# Patient Record
Sex: MALE | Race: WHITE | Employment: FULL TIME | URBAN - METROPOLITAN AREA
[De-identification: names, ages, dates, MRNs, and addresses within clinical notes are randomized per-mention and may not be internally consistent; named-entity substitution may affect disease eponyms.]

---

## 2017-04-12 ENCOUNTER — OFFICE VISIT (OUTPATIENT)
Dept: INTERNAL MEDICINE CLINIC | Age: 62
End: 2017-04-12

## 2017-04-12 VITALS
SYSTOLIC BLOOD PRESSURE: 140 MMHG | OXYGEN SATURATION: 95 % | WEIGHT: 255 LBS | HEART RATE: 100 BPM | RESPIRATION RATE: 16 BRPM | BODY MASS INDEX: 31.04 KG/M2 | DIASTOLIC BLOOD PRESSURE: 90 MMHG

## 2017-04-12 DIAGNOSIS — Z12.12 SCREENING FOR COLORECTAL CANCER: ICD-10-CM

## 2017-04-12 DIAGNOSIS — E78.2 MIXED HYPERLIPIDEMIA: ICD-10-CM

## 2017-04-12 DIAGNOSIS — I48.19 ATRIAL FIBRILLATION, PERSISTENT (HCC): Primary | ICD-10-CM

## 2017-04-12 DIAGNOSIS — I10 ESSENTIAL HYPERTENSION: ICD-10-CM

## 2017-04-12 DIAGNOSIS — M10.079 IDIOPATHIC GOUT OF FOOT, UNSPECIFIED CHRONICITY, UNSPECIFIED LATERALITY: ICD-10-CM

## 2017-04-12 DIAGNOSIS — Z00.00 PREVENTATIVE HEALTH CARE: ICD-10-CM

## 2017-04-12 DIAGNOSIS — I48.91 ATRIAL FIBRILLATION, NEW ONSET (HCC): ICD-10-CM

## 2017-04-12 DIAGNOSIS — Z12.11 SCREENING FOR COLORECTAL CANCER: ICD-10-CM

## 2017-04-12 PROCEDURE — 93000 ELECTROCARDIOGRAM COMPLETE: CPT | Performed by: INTERNAL MEDICINE

## 2017-04-12 PROCEDURE — 99213 OFFICE O/P EST LOW 20 MIN: CPT | Performed by: INTERNAL MEDICINE

## 2017-04-12 RX ORDER — ATORVASTATIN CALCIUM 10 MG/1
TABLET, FILM COATED ORAL
Qty: 90 TABLET | Refills: 1 | Status: SHIPPED | OUTPATIENT
Start: 2017-04-12 | End: 2017-10-16 | Stop reason: SDUPTHER

## 2017-04-12 RX ORDER — PREDNISONE 1 MG/1
TABLET ORAL
Qty: 36 TABLET | Refills: 0 | Status: SHIPPED | OUTPATIENT
Start: 2017-04-12 | End: 2018-05-22 | Stop reason: ALTCHOICE

## 2017-04-12 RX ORDER — ALLOPURINOL 100 MG/1
100 TABLET ORAL DAILY
Qty: 90 TABLET | Refills: 1 | Status: SHIPPED | OUTPATIENT
Start: 2017-04-12 | End: 2017-10-16 | Stop reason: SDUPTHER

## 2017-04-12 RX ORDER — LISINOPRIL 10 MG/1
10 TABLET ORAL DAILY
Qty: 90 TABLET | Refills: 1 | Status: SHIPPED | OUTPATIENT
Start: 2017-04-12 | End: 2017-10-16 | Stop reason: SDUPTHER

## 2017-04-12 ASSESSMENT — PATIENT HEALTH QUESTIONNAIRE - PHQ9
1. LITTLE INTEREST OR PLEASURE IN DOING THINGS: 0
2. FEELING DOWN, DEPRESSED OR HOPELESS: 0
SUM OF ALL RESPONSES TO PHQ QUESTIONS 1-9: 0
SUM OF ALL RESPONSES TO PHQ9 QUESTIONS 1 & 2: 0

## 2017-04-13 ENCOUNTER — INITIAL CONSULT (OUTPATIENT)
Dept: CARDIOLOGY CLINIC | Age: 62
End: 2017-04-13

## 2017-04-13 VITALS
WEIGHT: 257 LBS | HEART RATE: 85 BPM | OXYGEN SATURATION: 97 % | HEIGHT: 76 IN | DIASTOLIC BLOOD PRESSURE: 80 MMHG | SYSTOLIC BLOOD PRESSURE: 128 MMHG | BODY MASS INDEX: 31.29 KG/M2

## 2017-04-13 DIAGNOSIS — I10 ESSENTIAL HYPERTENSION: ICD-10-CM

## 2017-04-13 DIAGNOSIS — E78.2 MIXED HYPERLIPIDEMIA: ICD-10-CM

## 2017-04-13 DIAGNOSIS — I48.91 NEW ONSET ATRIAL FIBRILLATION (HCC): Primary | ICD-10-CM

## 2017-04-13 PROCEDURE — 99203 OFFICE O/P NEW LOW 30 MIN: CPT | Performed by: INTERNAL MEDICINE

## 2017-04-13 PROCEDURE — 93225 XTRNL ECG REC<48 HRS REC: CPT | Performed by: INTERNAL MEDICINE

## 2017-04-17 LAB — HEPATITIS C ANTIBODY: NEGATIVE

## 2017-04-20 ENCOUNTER — OFFICE VISIT (OUTPATIENT)
Dept: CARDIOLOGY CLINIC | Age: 62
End: 2017-04-20

## 2017-04-20 VITALS
SYSTOLIC BLOOD PRESSURE: 120 MMHG | HEIGHT: 76 IN | HEART RATE: 82 BPM | WEIGHT: 251 LBS | DIASTOLIC BLOOD PRESSURE: 82 MMHG | BODY MASS INDEX: 30.56 KG/M2

## 2017-04-20 DIAGNOSIS — I48.91 NEW ONSET ATRIAL FIBRILLATION (HCC): Primary | ICD-10-CM

## 2017-04-20 DIAGNOSIS — I10 ESSENTIAL HYPERTENSION: ICD-10-CM

## 2017-04-20 PROCEDURE — 93227 XTRNL ECG REC<48 HR R&I: CPT | Performed by: INTERNAL MEDICINE

## 2017-04-20 PROCEDURE — 99213 OFFICE O/P EST LOW 20 MIN: CPT | Performed by: INTERNAL MEDICINE

## 2017-04-20 RX ORDER — METOPROLOL TARTRATE 50 MG/1
50 TABLET, FILM COATED ORAL 2 TIMES DAILY
Qty: 30 TABLET | Refills: 5 | Status: SHIPPED | OUTPATIENT
Start: 2017-04-20 | End: 2017-10-16 | Stop reason: SDUPTHER

## 2017-04-25 LAB
COMMENT: NORMAL
DATE:: NORMAL
HEMOCCULT SP1 STL QL: NEGATIVE
HEMOCCULT SP2 STL QL: NEGATIVE
HEMOCCULT SP3 STL QL: NEGATIVE
Lab: NORMAL
RESOLUTION: NORMAL

## 2017-05-04 ENCOUNTER — PROCEDURE VISIT (OUTPATIENT)
Dept: CARDIOLOGY CLINIC | Age: 62
End: 2017-05-04

## 2017-05-04 DIAGNOSIS — I48.91 NEW ONSET ATRIAL FIBRILLATION (HCC): ICD-10-CM

## 2017-05-04 DIAGNOSIS — I48.91 NEW ONSET A-FIB (HCC): ICD-10-CM

## 2017-05-04 DIAGNOSIS — R94.31 ABNORMAL EKG: Primary | ICD-10-CM

## 2017-05-04 DIAGNOSIS — I10 ESSENTIAL HYPERTENSION: ICD-10-CM

## 2017-05-04 PROCEDURE — 93015 CV STRESS TEST SUPVJ I&R: CPT | Performed by: INTERNAL MEDICINE

## 2017-10-16 ENCOUNTER — OFFICE VISIT (OUTPATIENT)
Dept: INTERNAL MEDICINE CLINIC | Age: 62
End: 2017-10-16

## 2017-10-16 VITALS
HEART RATE: 70 BPM | DIASTOLIC BLOOD PRESSURE: 84 MMHG | SYSTOLIC BLOOD PRESSURE: 130 MMHG | HEIGHT: 76 IN | BODY MASS INDEX: 30.56 KG/M2 | OXYGEN SATURATION: 98 % | WEIGHT: 251 LBS

## 2017-10-16 DIAGNOSIS — I10 ESSENTIAL HYPERTENSION: Primary | ICD-10-CM

## 2017-10-16 DIAGNOSIS — I48.91 NEW ONSET ATRIAL FIBRILLATION (HCC): ICD-10-CM

## 2017-10-16 DIAGNOSIS — F51.01 PRIMARY INSOMNIA: ICD-10-CM

## 2017-10-16 DIAGNOSIS — E78.2 MIXED HYPERLIPIDEMIA: ICD-10-CM

## 2017-10-16 DIAGNOSIS — M10.079 IDIOPATHIC GOUT OF FOOT, UNSPECIFIED CHRONICITY, UNSPECIFIED LATERALITY: ICD-10-CM

## 2017-10-16 PROCEDURE — 99213 OFFICE O/P EST LOW 20 MIN: CPT | Performed by: INTERNAL MEDICINE

## 2017-10-16 RX ORDER — ATORVASTATIN CALCIUM 10 MG/1
TABLET, FILM COATED ORAL
Qty: 90 TABLET | Refills: 1 | Status: SHIPPED | OUTPATIENT
Start: 2017-10-16 | End: 2017-10-31 | Stop reason: SDUPTHER

## 2017-10-16 RX ORDER — INDOMETHACIN 50 MG/1
50 CAPSULE ORAL 3 TIMES DAILY
Qty: 180 CAPSULE | Refills: 1 | Status: ON HOLD | OUTPATIENT
Start: 2017-10-16 | End: 2019-05-09 | Stop reason: HOSPADM

## 2017-10-16 RX ORDER — LISINOPRIL 10 MG/1
10 TABLET ORAL DAILY
Qty: 90 TABLET | Refills: 1 | Status: SHIPPED | OUTPATIENT
Start: 2017-10-16 | End: 2018-05-22 | Stop reason: SDUPTHER

## 2017-10-16 RX ORDER — ALLOPURINOL 100 MG/1
100 TABLET ORAL DAILY
Qty: 90 TABLET | Refills: 1 | Status: SHIPPED | OUTPATIENT
Start: 2017-10-16 | End: 2018-04-05 | Stop reason: SDUPTHER

## 2017-10-16 RX ORDER — PREDNISONE 1 MG/1
TABLET ORAL
Qty: 36 TABLET | Refills: 0 | Status: CANCELLED | OUTPATIENT
Start: 2017-10-16

## 2017-10-16 RX ORDER — LANOLIN ALCOHOL/MO/W.PET/CERES
3 CREAM (GRAM) TOPICAL DAILY
Qty: 1 TABLET | Refills: 0 | Status: SHIPPED | OUTPATIENT
Start: 2017-10-16 | End: 2018-08-20 | Stop reason: SDUPTHER

## 2017-10-16 RX ORDER — PREDNISONE 1 MG/1
TABLET ORAL
Qty: 36 TABLET | Refills: 0 | Status: SHIPPED | OUTPATIENT
Start: 2017-10-16 | End: 2018-05-22 | Stop reason: ALTCHOICE

## 2017-10-16 NOTE — PROGRESS NOTES
diet and also exercise, wt loss as appropriate. Will continue periodic monitoring of fasting lipid profile, glucose, liver function. -     atorvastatin (LIPITOR) 10 MG tablet; TAKE 1 TABLET BY MOUTH ONE TIME A DAY    Idiopathic gout of foot, unspecified chronicity, unspecified laterality- CONT RX, KEEPING A PRED TAPER ON HAND FOR ANY SEVERE FLARE  -     allopurinol (ZYLOPRIM) 100 MG tablet; Take 1 tablet by mouth daily  -     indomethacin (INDOCIN) 50 MG capsule; Take 1 capsule by mouth 3 times daily  -     predniSONE (DELTASONE) 5 MG tablet; Take 8 pills, then 7,6,5,4,3,2,1    New onset atrial fibrillation ([address]- CLINICALLY RATE STABLE BUT STILL IRREG, FOR CONT CLOSE F/U W DR CONLEY, DEFER TO CARDIOLOGY IF NEEDS ANTICOAGULATION  -     metoprolol tartrate (LOPRESSOR) 25 MG tablet; Take 1 tablet by mouth 2 times daily    Primary insomnia- TRIAL OTC RX PRN  -     melatonin (RA MELATONIN) 3 MG TABS tablet;  Take 1 tablet by mouth daily

## 2017-10-31 DIAGNOSIS — E78.2 MIXED HYPERLIPIDEMIA: ICD-10-CM

## 2017-10-31 RX ORDER — ATORVASTATIN CALCIUM 10 MG/1
TABLET, FILM COATED ORAL
Qty: 30 TABLET | Refills: 5 | Status: SHIPPED | OUTPATIENT
Start: 2017-10-31 | End: 2018-04-03 | Stop reason: SDUPTHER

## 2017-10-31 NOTE — TELEPHONE ENCOUNTER
Spoke with wife. Informed her that Lipitor is free at [facility]. Sent new script.

## 2017-10-31 NOTE — TELEPHONE ENCOUNTER
PLS HAVE HIM CHECK AT [facility] [place], AS LIPITOR WILL LIKELY BE FREE THERE. CAN SEND NEW SCRIPT THERE FOR #30 W 5RF.

## 2017-11-09 ENCOUNTER — OFFICE VISIT (OUTPATIENT)
Dept: CARDIOLOGY CLINIC | Age: 62
End: 2017-11-09

## 2017-11-09 VITALS
HEART RATE: 78 BPM | WEIGHT: 253.4 LBS | DIASTOLIC BLOOD PRESSURE: 86 MMHG | HEIGHT: 76 IN | RESPIRATION RATE: 16 BRPM | SYSTOLIC BLOOD PRESSURE: 136 MMHG | BODY MASS INDEX: 30.86 KG/M2

## 2017-11-09 DIAGNOSIS — I10 ESSENTIAL HYPERTENSION: Primary | ICD-10-CM

## 2017-11-09 DIAGNOSIS — I48.19 PERSISTENT ATRIAL FIBRILLATION (HCC): ICD-10-CM

## 2017-11-09 DIAGNOSIS — E78.2 MIXED HYPERLIPIDEMIA: ICD-10-CM

## 2017-11-09 PROCEDURE — 99214 OFFICE O/P EST MOD 30 MIN: CPT | Performed by: INTERNAL MEDICINE

## 2017-11-09 RX ORDER — METOPROLOL SUCCINATE 50 MG/1
50 TABLET, EXTENDED RELEASE ORAL DAILY
Qty: 90 TABLET | Refills: 3 | Status: SHIPPED | OUTPATIENT
Start: 2017-11-09 | End: 2018-05-22 | Stop reason: SDUPTHER

## 2017-11-09 NOTE — ASSESSMENT & PLAN NOTE
Metoprolol tartrate is changed to metoprolol succinate 50 mg once a day. He can take it in the morning and if he feels extremely tired or fatigued agents which to evening dose. Potential side effects are discussed and questions answered. His chads score is 1 and he is to continue aspirin once a day.

## 2017-11-09 NOTE — ASSESSMENT & PLAN NOTE
He has a request for blood test.  He needs to have her lipids checked. His LDL goal is less than 100. He is encouraged to take Lipitor at bedtime for best effect and multiple questions are answered and he is educated with regard to lipid metabolism in the body.

## 2017-11-09 NOTE — PROGRESS NOTES
auscultation and percussion  Abdomen: Soft non tender. Bowel sounds are normal. No organomegaly or ascites. Musculoskeletal: WNL  Skin: Normal in color and texture. No rash  Psychiatric: Normal mood and effect. Neurologic exam:  No focal deficit    [id number] Score for Atrial Fibrillation Stroke Risk   Risk   Factors  Component Value   C CHF No 0   H HTN Yes 1   A2 Age >= 76 No,  (60 y.o.) 0   D DM No 0   S2 Prior Stroke/TIA No 0   V Vascular Disease No 0   A Age 74-69 No,  (60 y.o.) 0   Sc Sex male 0    [id number]  Score  1   Score last updated 11/9/17 8:27 AM    LAB REVIEW:  CBC: No results found for: WBC, HGB, HCT, PLT  Lipids: No results found for: CHOL, TRIG, HDL, LDLCALC, LDLDIRECT, LDLCHOLESTEROL, VLDL, LABVLDL, CHOLHDLRATIO  Renal: No results found for: BUN, CREATININE, NA, K  PT/INR: No results found for: INR    IMPRESSION and RECOMMENDATIONS:      Persistent atrial fibrillation (HCC)  Metoprolol tartrate is changed to metoprolol succinate 50 mg once a day. He can take it in the morning and if he feels extremely tired or fatigued agents which to evening dose. Potential side effects are discussed and questions answered. His chads score is 1 and he is to continue aspirin once a day. Hyperlipidemia  He has a request for blood test.  He needs to have her lipids checked. His LDL goal is less than 100. He is encouraged to take Lipitor at bedtime for best effect and multiple questions are answered and he is educated with regard to lipid metabolism in the body. Hypertension  Well controlled on current medications, reviewed individually with patient. Change Lopressor 25 bid to Toprol 25 mg daily. Continue other current cardiovascular medications which have been reviewed and discussed individually with you. Appropriate prescriptions if needed on this visit are addressed. After visit summery is provided. Questions answered and patient verbalizes understanding.  Follow up in office in 12 months with ECG and Echo, sooner if needed. Vernon Roper MD, 11/9/2017 8:44 AM     Please note this report has been partially produced using speech recognition software and may contain errors related to that system including errors in grammar, punctuation, and spelling, as well as words and phrases that may be inappropriate. If there are any questions or concerns please feel free to contact the dictating provider for clarification.

## 2017-11-09 NOTE — PATIENT INSTRUCTIONS
Change Lopressor 25 bid to Toprol 25 mg daily. Continue other current cardiovascular medications which have been reviewed and discussed individually with you. Appropriate prescriptions if needed on this visit are addressed. After visit summery is provided. Questions answered and patient verbalizes understanding. Follow up in office in 12 months with ECG and Echo, sooner if needed.

## 2018-04-05 DIAGNOSIS — M10.079 IDIOPATHIC GOUT OF FOOT, UNSPECIFIED CHRONICITY, UNSPECIFIED LATERALITY: ICD-10-CM

## 2018-04-05 RX ORDER — ALLOPURINOL 100 MG/1
100 TABLET ORAL DAILY
Qty: 30 TABLET | Refills: 0 | Status: SHIPPED | OUTPATIENT
Start: 2018-04-05 | End: 2018-05-22 | Stop reason: SDUPTHER

## 2018-05-22 ENCOUNTER — OFFICE VISIT (OUTPATIENT)
Dept: INTERNAL MEDICINE CLINIC | Age: 63
End: 2018-05-22

## 2018-05-22 VITALS
HEART RATE: 94 BPM | HEIGHT: 76 IN | DIASTOLIC BLOOD PRESSURE: 89 MMHG | RESPIRATION RATE: 17 BRPM | SYSTOLIC BLOOD PRESSURE: 128 MMHG | WEIGHT: 250 LBS | OXYGEN SATURATION: 96 % | BODY MASS INDEX: 30.44 KG/M2

## 2018-05-22 DIAGNOSIS — Z12.11 SCREENING FOR COLORECTAL CANCER: ICD-10-CM

## 2018-05-22 DIAGNOSIS — I10 ESSENTIAL HYPERTENSION: ICD-10-CM

## 2018-05-22 DIAGNOSIS — E78.2 MIXED HYPERLIPIDEMIA: ICD-10-CM

## 2018-05-22 DIAGNOSIS — M10.079 IDIOPATHIC GOUT OF FOOT, UNSPECIFIED CHRONICITY, UNSPECIFIED LATERALITY: ICD-10-CM

## 2018-05-22 DIAGNOSIS — I48.19 PERSISTENT ATRIAL FIBRILLATION (HCC): ICD-10-CM

## 2018-05-22 DIAGNOSIS — Z12.12 SCREENING FOR COLORECTAL CANCER: ICD-10-CM

## 2018-05-22 DIAGNOSIS — Z00.00 ROUTINE GENERAL MEDICAL EXAMINATION AT A HEALTH CARE FACILITY: ICD-10-CM

## 2018-05-22 DIAGNOSIS — Z00.00 ROUTINE GENERAL MEDICAL EXAMINATION AT A HEALTH CARE FACILITY: Primary | ICD-10-CM

## 2018-05-22 DIAGNOSIS — Z23 NEED FOR PROPHYLACTIC VACCINATION AGAINST STREPTOCOCCUS PNEUMONIAE (PNEUMOCOCCUS): ICD-10-CM

## 2018-05-22 LAB
A/G RATIO: 1.4 (ref 1.1–2.2)
ALBUMIN SERPL-MCNC: 4.3 G/DL (ref 3.4–5)
ALP BLD-CCNC: 88 U/L (ref 40–129)
ALT SERPL-CCNC: 18 U/L (ref 10–40)
ANION GAP SERPL CALCULATED.3IONS-SCNC: 21 MMOL/L (ref 3–16)
AST SERPL-CCNC: 19 U/L (ref 15–37)
BASOPHILS ABSOLUTE: 0.1 K/UL (ref 0–0.2)
BASOPHILS RELATIVE PERCENT: 1.1 %
BILIRUB SERPL-MCNC: 0.5 MG/DL (ref 0–1)
BUN BLDV-MCNC: 19 MG/DL (ref 7–20)
CALCIUM SERPL-MCNC: 9.4 MG/DL (ref 8.3–10.6)
CHLORIDE BLD-SCNC: 102 MMOL/L (ref 99–110)
CHOLESTEROL, TOTAL: 190 MG/DL (ref 0–199)
CO2: 23 MMOL/L (ref 21–32)
CREAT SERPL-MCNC: 1.2 MG/DL (ref 0.8–1.3)
EOSINOPHILS ABSOLUTE: 0.1 K/UL (ref 0–0.6)
EOSINOPHILS RELATIVE PERCENT: 1.8 %
GFR AFRICAN AMERICAN: >60
GFR NON-AFRICAN AMERICAN: >60
GLOBULIN: 3.1 G/DL
GLUCOSE BLD-MCNC: 87 MG/DL (ref 70–99)
HCT VFR BLD CALC: 42.8 % (ref 40.5–52.5)
HDLC SERPL-MCNC: 41 MG/DL (ref 40–60)
HEMOGLOBIN: 14.5 G/DL (ref 13.5–17.5)
LDL CHOLESTEROL CALCULATED: 115 MG/DL
LYMPHOCYTES ABSOLUTE: 1.4 K/UL (ref 1–5.1)
LYMPHOCYTES RELATIVE PERCENT: 18.4 %
MCH RBC QN AUTO: 30.8 PG (ref 26–34)
MCHC RBC AUTO-ENTMCNC: 33.9 G/DL (ref 31–36)
MCV RBC AUTO: 90.7 FL (ref 80–100)
MONOCYTES ABSOLUTE: 0.6 K/UL (ref 0–1.3)
MONOCYTES RELATIVE PERCENT: 7.9 %
NEUTROPHILS ABSOLUTE: 5.6 K/UL (ref 1.7–7.7)
NEUTROPHILS RELATIVE PERCENT: 70.8 %
PDW BLD-RTO: 13.7 % (ref 12.4–15.4)
PLATELET # BLD: 267 K/UL (ref 135–450)
PMV BLD AUTO: 8.3 FL (ref 5–10.5)
POTASSIUM SERPL-SCNC: 4.6 MMOL/L (ref 3.5–5.1)
RBC # BLD: 4.72 M/UL (ref 4.2–5.9)
SODIUM BLD-SCNC: 146 MMOL/L (ref 136–145)
TOTAL PROTEIN: 7.4 G/DL (ref 6.4–8.2)
TRIGL SERPL-MCNC: 171 MG/DL (ref 0–150)
TSH REFLEX: 2.07 UIU/ML (ref 0.27–4.2)
URIC ACID, SERUM: 8.5 MG/DL (ref 3.5–7.2)
VLDLC SERPL CALC-MCNC: 34 MG/DL
WBC # BLD: 7.9 K/UL (ref 4–11)

## 2018-05-22 PROCEDURE — G0009 ADMIN PNEUMOCOCCAL VACCINE: HCPCS | Performed by: INTERNAL MEDICINE

## 2018-05-22 PROCEDURE — 90670 PCV13 VACCINE IM: CPT | Performed by: INTERNAL MEDICINE

## 2018-05-22 PROCEDURE — G0402 INITIAL PREVENTIVE EXAM: HCPCS | Performed by: INTERNAL MEDICINE

## 2018-05-22 PROCEDURE — 4040F PNEUMOC VAC/ADMIN/RCVD: CPT | Performed by: INTERNAL MEDICINE

## 2018-05-22 RX ORDER — ALLOPURINOL 100 MG/1
100 TABLET ORAL DAILY
Qty: 90 TABLET | Refills: 1 | Status: SHIPPED | OUTPATIENT
Start: 2018-05-22 | End: 2018-05-23 | Stop reason: SDUPTHER

## 2018-05-22 RX ORDER — METOPROLOL SUCCINATE 50 MG/1
50 TABLET, EXTENDED RELEASE ORAL DAILY
Qty: 90 TABLET | Refills: 1 | Status: SHIPPED | OUTPATIENT
Start: 2018-05-22 | End: 2018-08-14 | Stop reason: SDUPTHER

## 2018-05-22 RX ORDER — LISINOPRIL 10 MG/1
10 TABLET ORAL DAILY
Qty: 90 TABLET | Refills: 1 | Status: SHIPPED | OUTPATIENT
Start: 2018-05-22 | End: 2018-08-14 | Stop reason: SDUPTHER

## 2018-05-22 RX ORDER — ATORVASTATIN CALCIUM 10 MG/1
TABLET, FILM COATED ORAL
Qty: 90 TABLET | Refills: 1 | Status: SHIPPED | OUTPATIENT
Start: 2018-05-22 | End: 2019-01-01 | Stop reason: SDUPTHER

## 2018-05-22 RX ORDER — TERAZOSIN 1 MG/1
1 CAPSULE ORAL NIGHTLY
Qty: 90 CAPSULE | Refills: 1 | Status: SHIPPED | OUTPATIENT
Start: 2018-05-22 | End: 2019-11-13

## 2018-05-22 ASSESSMENT — ANXIETY QUESTIONNAIRES: GAD7 TOTAL SCORE: 0

## 2018-05-22 ASSESSMENT — PATIENT HEALTH QUESTIONNAIRE - PHQ9: SUM OF ALL RESPONSES TO PHQ QUESTIONS 1-9: 0

## 2018-05-22 ASSESSMENT — LIFESTYLE VARIABLES: HOW OFTEN DO YOU HAVE A DRINK CONTAINING ALCOHOL: 0

## 2018-05-23 ENCOUNTER — TELEPHONE (OUTPATIENT)
Dept: INTERNAL MEDICINE CLINIC | Age: 63
End: 2018-05-23

## 2018-05-23 DIAGNOSIS — M10.079 IDIOPATHIC GOUT OF FOOT, UNSPECIFIED CHRONICITY, UNSPECIFIED LATERALITY: ICD-10-CM

## 2018-05-23 RX ORDER — ALLOPURINOL 300 MG/1
300 TABLET ORAL DAILY
Qty: 90 TABLET | Refills: 1 | Status: SHIPPED | OUTPATIENT
Start: 2018-05-23 | End: 2018-08-24 | Stop reason: SDUPTHER

## 2018-05-24 LAB — VZV IGG SER QL IA: 388 IV

## 2018-06-18 ENCOUNTER — TELEPHONE (OUTPATIENT)
Dept: INTERNAL MEDICINE CLINIC | Age: 63
End: 2018-06-18

## 2018-06-18 DIAGNOSIS — M79.673 PAIN OF FOOT, UNSPECIFIED LATERALITY: Primary | ICD-10-CM

## 2018-06-18 DIAGNOSIS — M10.079 IDIOPATHIC GOUT OF FOOT, UNSPECIFIED CHRONICITY, UNSPECIFIED LATERALITY: ICD-10-CM

## 2018-06-21 ENCOUNTER — PAT TELEPHONE (OUTPATIENT)
Dept: SURGERY | Age: 63
End: 2018-06-21

## 2018-06-26 ENCOUNTER — HOSPITAL ENCOUNTER (OUTPATIENT)
Dept: SURGERY | Age: 63
Discharge: HOME | End: 2018-06-26
Attending: SPECIALIST | Admitting: SPECIALIST

## 2018-06-26 VITALS
RESPIRATION RATE: 16 BRPM | DIASTOLIC BLOOD PRESSURE: 90 MMHG | WEIGHT: 251 LBS | TEMPERATURE: 97.9 F | BODY MASS INDEX: 30.56 KG/M2 | HEART RATE: 80 BPM | OXYGEN SATURATION: 97 % | SYSTOLIC BLOOD PRESSURE: 134 MMHG | HEIGHT: 76 IN

## 2018-06-26 RX ORDER — SODIUM CHLORIDE, SODIUM LACTATE, POTASSIUM CHLORIDE, CALCIUM CHLORIDE 600; 310; 30; 20 MG/100ML; MG/100ML; MG/100ML; MG/100ML
INJECTION, SOLUTION INTRAVENOUS CONTINUOUS
Status: DISCONTINUED | OUTPATIENT
Start: 2018-06-26 | End: 2018-06-27 | Stop reason: HOSPADM

## 2018-06-26 RX ADMIN — SODIUM CHLORIDE, SODIUM LACTATE, POTASSIUM CHLORIDE, CALCIUM CHLORIDE: 600; 310; 30; 20 INJECTION, SOLUTION INTRAVENOUS at 10:25

## 2018-06-26 ASSESSMENT — PAIN SCALES - GENERAL
PAINLEVEL_OUTOF10: 0
PAINLEVEL_OUTOF10: 0

## 2018-06-26 ASSESSMENT — PAIN - FUNCTIONAL ASSESSMENT: PAIN_FUNCTIONAL_ASSESSMENT: 0-10

## 2018-06-27 PROBLEM — D12.6 TUBULAR ADENOMA OF COLON: Status: ACTIVE | Noted: 2018-06-26

## 2018-06-28 ENCOUNTER — TELEPHONE (OUTPATIENT)
Dept: INTERNAL MEDICINE CLINIC | Age: 63
End: 2018-06-28

## 2018-06-28 DIAGNOSIS — K63.5 POLYP OF COLON, UNSPECIFIED PART OF COLON, UNSPECIFIED TYPE: Primary | ICD-10-CM

## 2018-07-12 ENCOUNTER — OFFICE VISIT (OUTPATIENT)
Dept: SURGERY | Age: 63
End: 2018-07-12

## 2018-07-12 VITALS
BODY MASS INDEX: 30.44 KG/M2 | HEART RATE: 90 BPM | DIASTOLIC BLOOD PRESSURE: 86 MMHG | SYSTOLIC BLOOD PRESSURE: 138 MMHG | HEIGHT: 76 IN | WEIGHT: 250 LBS

## 2018-07-12 DIAGNOSIS — K63.89 CECUM MASS: Primary | ICD-10-CM

## 2018-07-12 PROCEDURE — 4040F PNEUMOC VAC/ADMIN/RCVD: CPT | Performed by: SURGERY

## 2018-07-12 PROCEDURE — 1123F ACP DISCUSS/DSCN MKR DOCD: CPT | Performed by: SURGERY

## 2018-07-12 PROCEDURE — 3017F COLORECTAL CA SCREEN DOC REV: CPT | Performed by: SURGERY

## 2018-07-12 PROCEDURE — 99204 OFFICE O/P NEW MOD 45 MIN: CPT | Performed by: SURGERY

## 2018-07-12 PROCEDURE — 1036F TOBACCO NON-USER: CPT | Performed by: SURGERY

## 2018-07-12 PROCEDURE — G8417 CALC BMI ABV UP PARAM F/U: HCPCS | Performed by: SURGERY

## 2018-07-12 PROCEDURE — 1101F PT FALLS ASSESS-DOCD LE1/YR: CPT | Performed by: SURGERY

## 2018-07-12 PROCEDURE — G8427 DOCREV CUR MEDS BY ELIG CLIN: HCPCS | Performed by: SURGERY

## 2018-07-14 ENCOUNTER — TELEPHONE (OUTPATIENT)
Dept: CARDIOLOGY CLINIC | Age: 63
End: 2018-07-14

## 2018-07-17 ENCOUNTER — TELEPHONE (OUTPATIENT)
Dept: SURGERY | Age: 63
End: 2018-07-17

## 2018-07-19 ENCOUNTER — TELEPHONE (OUTPATIENT)
Dept: CARDIOLOGY CLINIC | Age: 63
End: 2018-07-19

## 2018-07-19 NOTE — TELEPHONE ENCOUNTER
I was returning a call from a message left on answering machine here at office. I was trying to schedule office visit for cardiac clearance for surg on 8/10.  Left message for patient to return call so we can get this set up 7/19 LN

## 2018-07-23 ENCOUNTER — TELEPHONE (OUTPATIENT)
Dept: CARDIOLOGY CLINIC | Age: 63
End: 2018-07-23

## 2018-07-23 NOTE — TELEPHONE ENCOUNTER
Patient called gave all above information to patient.  He voiced understanding and will be looking for the mailed information as well

## 2018-08-02 NOTE — ANESTHESIA PRE-OP
Component Value Date/Time    WBC 6.6 08/03/2018 09:50 AM    HGB 14.2 08/03/2018 09:50 AM    HCT 43.8 08/03/2018 09:50 AM     08/03/2018 09:50 AM     RENAL  Lab Results   Component Value Date/Time     08/03/2018 09:50 AM    K 4.7 08/03/2018 09:50 AM     08/03/2018 09:50 AM    CO2 28 08/03/2018 09:50 AM    BUN 16 08/03/2018 09:50 AM    CREATININE 1.1 08/03/2018 09:50 AM    GLUCOSE 112 (H) 08/03/2018 09:50 AM       Summary:  Chart reviewed, pt. Interviewed and examined. Planned surgical procedure:  Robotic Right Colon Resection per Dr. Leilani Sanchez. 1.  Cardiac risk assessment per Dr. Gal Perez. Considered a low risk candidate for any omar-operative cardiac complications. EKG: controlled rate AF, HR 60. HTN HLD, chronic PAF. On beta blocker and fASA 325mg. Denies CP or SOB. No regular execise. 2.  Non smoker. Able to lie flat. 3.  Reflux, C-scope with large sessile polyp in the tip of the cecum, planned right colon resection. Followed by Dr. Krystal Corrales. 4.  Borderline DM, FBS today: 112. No HbA1c for review. 5.  OA, hands and feets. S/p umbilical hernia repair. Gout mookie feet with neuropathy. On allopurinol, last flare, 6/2018. Anesthesia Evaluation will follow by a certified practitioner prior to surgery.     Electronically signed by SONNY Noel CNP on 8/2/2018 at 8:48 AM

## 2018-08-03 ENCOUNTER — HOSPITAL ENCOUNTER (OUTPATIENT)
Dept: PREADMISSION TESTING | Age: 63
Discharge: HOME | End: 2018-08-03
Attending: SURGERY | Admitting: SURGERY

## 2018-08-03 VITALS
WEIGHT: 250 LBS | SYSTOLIC BLOOD PRESSURE: 139 MMHG | RESPIRATION RATE: 16 BRPM | BODY MASS INDEX: 30.44 KG/M2 | HEART RATE: 82 BPM | HEIGHT: 76 IN | DIASTOLIC BLOOD PRESSURE: 93 MMHG | OXYGEN SATURATION: 97 % | TEMPERATURE: 98.5 F

## 2018-08-03 LAB
ANION GAP SERPL CALCULATED.3IONS-SCNC: 11 MMOL/L (ref 4–16)
BUN BLDV-MCNC: 16 MG/DL (ref 6–23)
CALCIUM SERPL-MCNC: 9 MG/DL (ref 8.3–10.6)
CHLORIDE BLD-SCNC: 104 MMOL/L (ref 99–110)
CO2: 28 MMOL/L (ref 21–32)
CREAT SERPL-MCNC: 1.1 MG/DL (ref 0.9–1.3)
EKG ATRIAL RATE: 227 BPM
EKG DIAGNOSIS: NORMAL
EKG Q-T INTERVAL: 370 MS
EKG QRS DURATION: 86 MS
EKG QTC CALCULATION (BAZETT): 370 MS
EKG R AXIS: 80 DEGREES
EKG T AXIS: 57 DEGREES
EKG VENTRICULAR RATE: 60 BPM
GFR AFRICAN AMERICAN: >60 ML/MIN/1.73M2
GFR NON-AFRICAN AMERICAN: >60 ML/MIN/1.73M2
GLUCOSE BLD-MCNC: 112 MG/DL (ref 70–99)
HCT VFR BLD CALC: 43.8 % (ref 42–52)
HEMOGLOBIN: 14.2 GM/DL (ref 13.5–18)
MCH RBC QN AUTO: 31.1 PG (ref 27–31)
MCHC RBC AUTO-ENTMCNC: 32.4 % (ref 32–36)
MCV RBC AUTO: 95.8 FL (ref 78–100)
PDW BLD-RTO: 14.5 % (ref 11.7–14.9)
PLATELET # BLD: 241 K/CU MM (ref 140–440)
PMV BLD AUTO: 9.4 FL (ref 7.5–11.1)
POTASSIUM SERPL-SCNC: 4.7 MMOL/L (ref 3.5–5.1)
RBC # BLD: 4.57 M/CU MM (ref 4.6–6.2)
SODIUM BLD-SCNC: 143 MMOL/L (ref 135–145)
WBC # BLD: 6.6 K/CU MM (ref 4–10.5)

## 2018-08-05 ASSESSMENT — ENCOUNTER SYMPTOMS
BACK PAIN: 0
STRIDOR: 0
EYE ITCHING: 0
EYE REDNESS: 0
COLOR CHANGE: 0
ANAL BLEEDING: 0
APNEA: 0
SORE THROAT: 0
PHOTOPHOBIA: 0
RECTAL PAIN: 0
CONSTIPATION: 0
CHOKING: 0

## 2018-08-05 NOTE — PROGRESS NOTES
06/26/2018    Dr Maritza Guardado, recheck one yr     Family History   Problem Relation Age of Onset    Heart Disease Mother     Diabetes Mother     Heart Disease Father     Diabetes Sister     Diabetes Brother     Heart Disease Brother     Heart Surgery Brother         CABG    Diabetes Brother     Diabetes Brother      Social History     Social History    Marital status:      Spouse name: N/A    Number of children: N/A    Years of education: N/A     Occupational History    construction      self employed     Social History Main Topics    Smoking status: Never Smoker    Smokeless tobacco: Never Used    Alcohol use Yes      Comment: Occasional    Drug use: No    Sexual activity: Not on file     Other Topics Concern    Not on file     Social History Narrative    No narrative on file       Current Outpatient Prescriptions   Medication Sig Dispense Refill    allopurinol (ZYLOPRIM) 300 MG tablet Take 1 tablet by mouth daily (Patient taking differently: Take 300 mg by mouth every morning ) 90 tablet 1    lisinopril (PRINIVIL;ZESTRIL) 10 MG tablet Take 1 tablet by mouth daily (Patient taking differently: Take 10 mg by mouth every morning ) 90 tablet 1    metoprolol succinate (TOPROL XL) 50 MG extended release tablet Take 1 tablet by mouth daily (Patient taking differently: Take 50 mg by mouth nightly ) 90 tablet 1    atorvastatin (LIPITOR) 10 MG tablet TAKE 1 TABLET BY MOUTH ONE TIME A DAY (Patient taking differently: every morning TAKE 1 TABLET BY MOUTH ONE TIME A DAY ) 90 tablet 1    terazosin (HYTRIN) 1 MG capsule Take 1 capsule by mouth nightly 90 capsule 1    melatonin (RA MELATONIN) 3 MG TABS tablet Take 1 tablet by mouth daily (Patient taking differently: Take 3 mg by mouth nightly as needed ) 1 tablet 0    aspirin 325 MG tablet Take 325 mg by mouth every morning  30 tablet     indomethacin (INDOCIN) 50 MG capsule Take 1 capsule by mouth 3 times daily (Patient taking differently: Take 50 mg

## 2018-08-10 PROBLEM — K63.89 CECUM MASS: Status: ACTIVE | Noted: 2018-08-10

## 2018-08-13 ENCOUNTER — TELEPHONE (OUTPATIENT)
Dept: INTERNAL MEDICINE CLINIC | Age: 63
End: 2018-08-13

## 2018-08-14 ENCOUNTER — OFFICE VISIT (OUTPATIENT)
Dept: INTERNAL MEDICINE CLINIC | Age: 63
End: 2018-08-14

## 2018-08-14 VITALS
OXYGEN SATURATION: 97 % | WEIGHT: 252 LBS | RESPIRATION RATE: 16 BRPM | HEART RATE: 88 BPM | SYSTOLIC BLOOD PRESSURE: 160 MMHG | DIASTOLIC BLOOD PRESSURE: 104 MMHG | BODY MASS INDEX: 30.67 KG/M2

## 2018-08-14 DIAGNOSIS — I10 ESSENTIAL HYPERTENSION: ICD-10-CM

## 2018-08-14 DIAGNOSIS — K63.89 CECUM MASS: ICD-10-CM

## 2018-08-14 DIAGNOSIS — I48.19 PERSISTENT ATRIAL FIBRILLATION (HCC): Primary | ICD-10-CM

## 2018-08-14 PROCEDURE — 1111F DSCHRG MED/CURRENT MED MERGE: CPT | Performed by: INTERNAL MEDICINE

## 2018-08-14 PROCEDURE — 99496 TRANSJ CARE MGMT HIGH F2F 7D: CPT | Performed by: INTERNAL MEDICINE

## 2018-08-14 RX ORDER — LISINOPRIL 20 MG/1
20 TABLET ORAL DAILY
Qty: 90 TABLET | Refills: 1 | Status: SHIPPED | OUTPATIENT
Start: 2018-08-14 | End: 2019-03-01 | Stop reason: SDUPTHER

## 2018-08-14 RX ORDER — METOPROLOL SUCCINATE 50 MG/1
50 TABLET, EXTENDED RELEASE ORAL 2 TIMES DAILY
Qty: 180 TABLET | Refills: 1 | Status: SHIPPED | OUTPATIENT
Start: 2018-08-14 | End: 2018-09-05

## 2018-08-14 NOTE — PROGRESS NOTES
Marked as Taking for the 8/14/18 encounter (Office Visit) with Sarah Watt MD   Medication Sig Dispense Refill    HYDROcodone-acetaminophen (NORCO) 5-325 MG per tablet Take 1 tablet by mouth every 6 hours as needed for Pain for up to 7 days. . 20 tablet 0    allopurinol (ZYLOPRIM) 300 MG tablet Take 1 tablet by mouth daily (Patient taking differently: Take 300 mg by mouth every morning ) 90 tablet 1    lisinopril (PRINIVIL;ZESTRIL) 10 MG tablet Take 1 tablet by mouth daily (Patient taking differently: Take 10 mg by mouth every morning ) 90 tablet 1    metoprolol succinate (TOPROL XL) 50 MG extended release tablet Take 1 tablet by mouth daily (Patient taking differently: Take 50 mg by mouth nightly ) 90 tablet 1    atorvastatin (LIPITOR) 10 MG tablet TAKE 1 TABLET BY MOUTH ONE TIME A DAY (Patient taking differently: every morning TAKE 1 TABLET BY MOUTH ONE TIME A DAY ) 90 tablet 1    terazosin (HYTRIN) 1 MG capsule Take 1 capsule by mouth nightly 90 capsule 1    indomethacin (INDOCIN) 50 MG capsule Take 1 capsule by mouth 3 times daily (Patient taking differently: Take 50 mg by mouth as needed ) 180 capsule 1    melatonin (RA MELATONIN) 3 MG TABS tablet Take 1 tablet by mouth daily (Patient taking differently: Take 3 mg by mouth nightly as needed ) 1 tablet 0    aspirin 325 MG tablet Take 325 mg by mouth every morning  30 tablet         Medications patient taking as of now reconciled against medications ordered at time of hospital discharge: Yes    Chief Complaint   Patient presents with    Follow-Up from Hospital     colon surgery    Hypertension       HPI    Inpatient course: Discharge summary reviewed- see chart. Interval history/Current status: SINCE DISCHARGE 2D AGO, HAS REMAINED IRREG. IS NOT ON ANTICOAGULATION, CHADS SCORE CALCULATED IN HOSP AT 2. NOT BEEN ON ANTICOAGULATION. WAS SCHED TO SEE DR Le Katz IN NOV. DENIES CURRENT SX OF ANY CP OR SOB. BP REMAINS HIGH.     Review of

## 2018-08-15 ENCOUNTER — OFFICE VISIT (OUTPATIENT)
Dept: CARDIOLOGY CLINIC | Age: 63
End: 2018-08-15

## 2018-08-15 VITALS
HEIGHT: 76 IN | SYSTOLIC BLOOD PRESSURE: 152 MMHG | DIASTOLIC BLOOD PRESSURE: 86 MMHG | BODY MASS INDEX: 30.74 KG/M2 | HEART RATE: 74 BPM | WEIGHT: 252.4 LBS

## 2018-08-15 DIAGNOSIS — E78.2 MIXED HYPERLIPIDEMIA: ICD-10-CM

## 2018-08-15 DIAGNOSIS — I48.19 PERSISTENT ATRIAL FIBRILLATION (HCC): Primary | ICD-10-CM

## 2018-08-15 DIAGNOSIS — I10 ESSENTIAL HYPERTENSION: ICD-10-CM

## 2018-08-15 PROCEDURE — 99214 OFFICE O/P EST MOD 30 MIN: CPT | Performed by: INTERNAL MEDICINE

## 2018-08-15 PROCEDURE — 4040F PNEUMOC VAC/ADMIN/RCVD: CPT | Performed by: INTERNAL MEDICINE

## 2018-08-15 PROCEDURE — 1036F TOBACCO NON-USER: CPT | Performed by: INTERNAL MEDICINE

## 2018-08-15 PROCEDURE — 1123F ACP DISCUSS/DSCN MKR DOCD: CPT | Performed by: INTERNAL MEDICINE

## 2018-08-15 PROCEDURE — 1101F PT FALLS ASSESS-DOCD LE1/YR: CPT | Performed by: INTERNAL MEDICINE

## 2018-08-15 PROCEDURE — G8417 CALC BMI ABV UP PARAM F/U: HCPCS | Performed by: INTERNAL MEDICINE

## 2018-08-15 PROCEDURE — 3017F COLORECTAL CA SCREEN DOC REV: CPT | Performed by: INTERNAL MEDICINE

## 2018-08-15 PROCEDURE — G8427 DOCREV CUR MEDS BY ELIG CLIN: HCPCS | Performed by: INTERNAL MEDICINE

## 2018-08-15 PROCEDURE — 1111F DSCHRG MED/CURRENT MED MERGE: CPT | Performed by: INTERNAL MEDICINE

## 2018-08-15 NOTE — PROGRESS NOTES
[id number] Score for Atrial Fibrillation Stroke Risk   Risk   Factors  Component Value   C CHF No 0   H HTN Yes 1   A2 Age >= 76 No,  (64 y.o.) 0   D DM No 0   S2 Prior Stroke/TIA No 0   V Vascular Disease No 0   A Age 74-69 Yes,  (64 y.o.) 1   Sc Sex male 0    [id number]  Score  2   Score last updated 7/43/48 54:66 AM    Click here for a link to the UpToDate guideline \"Atrial Fibrillation: Anticoagulation therapy to prevent embolization    Disclaimer: Risk Score calculation is dependent on accuracy of patient problem list and past encounter diagnosis.

## 2018-08-15 NOTE — PATIENT INSTRUCTIONS
Increase Lisinopril to 20 mg daily and continue Toprol 50 bid. Continue to monitor BP daily. the Goal is to keep it below 130/85 mm hg.  Pros and cons of anticoagulation vs. Aspirin therapy discussed and questions answered. verbalized understanding. Appropriate prescriptions if needed on this visit are addressed. After visit summery is provided. Questions answered and patient verbalizes understanding. Follow up in office in 6 months, sooner if needed.

## 2018-08-15 NOTE — PROGRESS NOTES
Olga Matson  1955  Julisa Dewey MD    Chief complaint and HPI:  Olga Matson  is a 68-year-old pleasant male follows up for persistent A. fib ablation. Patient recently underwent Robotic assisted laparoscopic right hemicolectomy 8/10/2018 by Dr Yojana John. He tolerated the procedure well. His blood pressure has been lately high. He saw Dr. Ang Hernandez and his Toprol was increased to 50 mg twice a day which she started yesterday. He has been monitoring his blood pressure closely at home and I have reviewed his readings for the last few days. His systolic blood pressure has ranged from 136-230 mmHg most it has been more than 147 mmHg and diastolic pressure has ranged from  mmHg mostly about 90 mmHg. Heart rate has been in 70s and 80s. Pt denies any chest pain, palpitations, shortness of breath, dizziness or swelling. 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 Rest of the Cardiovascular system review is otherwise unchanged from prior encounter. Past medical history:  has a past medical history of Arthritis; BPH (benign prostatic hypertrophy); Depression; Erectile dysfunction; Family history of diabetes mellitus; Gout, unspecified; History of exercise stress test; Hyperlipidemia; Hypertension; Impaired fasting glucose; New onset atrial fibrillation ([address] Other testicular hypofunction; Peripheral neuropathy; Persistent atrial fibrillation ([address] Sleep disturbance, unspecified; and Tubular adenoma of colon. Past surgical history:  has a past surgical history that includes Vasectomy (1982); Dental surgery (11/2016); Colonoscopy (06/26/2018); hernia repair (early 2000's); eye surgery (Bilateral, 2013); hemicolectomy (08/10/2018); and colectomy (08/07/2018).   Social History:   Social History   Substance Use Topics    Smoking status: Never Smoker    Smokeless tobacco: Never Used    Alcohol use Yes      Comment: Occasional     Family history: family history includes Diabetes in his brother, brother, brother, mother, and sister; Heart Disease in his brother, father, and mother; Heart Surgery in his brother. ALLERGIES:  Patient has no known allergies. Prior to Admission medications    Medication Sig Start Date End Date Taking? Authorizing Provider   lisinopril (PRINIVIL;ZESTRIL) 20 MG tablet Take 1 tablet by mouth daily 8/14/18  Yes Blaire Glass MD   metoprolol succinate (TOPROL XL) 50 MG extended release tablet Take 1 tablet by mouth 2 times daily 8/14/18  Yes Blaire Glass MD   HYDROcodone-acetaminophen (NORCO) 5-325 MG per tablet Take 1 tablet by mouth every 6 hours as needed for Pain for up to 7 days. . 8/12/18 8/19/18 Yes Noam Garcia MD   allopurinol (ZYLOPRIM) 300 MG tablet Take 1 tablet by mouth daily  Patient taking differently: Take 300 mg by mouth every morning  5/23/18  Yes Blaire Glass MD   atorvastatin (LIPITOR) 10 MG tablet TAKE 1 TABLET BY MOUTH ONE TIME A DAY  Patient taking differently: every morning TAKE 1 TABLET BY MOUTH ONE TIME A DAY  5/22/18  Yes Blaire Glass MD   terazosin (HYTRIN) 1 MG capsule Take 1 capsule by mouth nightly 5/22/18  Yes Blaire Glass MD   indomethacin (INDOCIN) 50 MG capsule Take 1 capsule by mouth 3 times daily  Patient taking differently: Take 50 mg by mouth as needed  10/16/17  Yes Blaire Glass MD   melatonin (RA MELATONIN) 3 MG TABS tablet Take 1 tablet by mouth daily  Patient taking differently: Take 3 mg by mouth nightly as needed  10/16/17  Yes Blaire Glass MD   aspirin 325 MG tablet Take 325 mg by mouth every morning  6/21/18  Yes Blaire Glass MD     Vitals:    08/15/18 1052 08/15/18 1059   BP: (!) 154/86 (!) 152/86   Pulse: 74    Weight: 252 lb 6.4 oz (114.5 kg)    Height: 6' 4\" (1.93 m)       Body mass index is 30.72 kg/m².   Wt Readings from Last 3 Encounters:   08/15/18 252 lb 6.4 oz (114.5 kg)   08/14/18 252 lb (114.3 kg)   08/13/18 250 lb (113.4 kg)     Constitutional: Patient  Is well-built and

## 2018-08-20 DIAGNOSIS — F51.01 PRIMARY INSOMNIA: ICD-10-CM

## 2018-08-20 RX ORDER — LANOLIN ALCOHOL/MO/W.PET/CERES
3 CREAM (GRAM) TOPICAL DAILY
Qty: 90 TABLET | Refills: 0 | Status: SHIPPED | OUTPATIENT
Start: 2018-08-20 | End: 2019-11-13

## 2018-08-24 DIAGNOSIS — M10.079 IDIOPATHIC GOUT OF FOOT, UNSPECIFIED CHRONICITY, UNSPECIFIED LATERALITY: ICD-10-CM

## 2018-08-24 RX ORDER — ALLOPURINOL 300 MG/1
300 TABLET ORAL DAILY
Qty: 90 TABLET | Refills: 1 | OUTPATIENT
Start: 2018-08-24 | End: 2019-06-14 | Stop reason: SDUPTHER

## 2018-08-27 ENCOUNTER — OFFICE VISIT (OUTPATIENT)
Dept: SURGERY | Age: 63
End: 2018-08-27

## 2018-08-27 VITALS
WEIGHT: 250 LBS | DIASTOLIC BLOOD PRESSURE: 78 MMHG | HEIGHT: 76 IN | BODY MASS INDEX: 30.44 KG/M2 | SYSTOLIC BLOOD PRESSURE: 128 MMHG

## 2018-08-27 DIAGNOSIS — K63.89 CECUM MASS: Primary | ICD-10-CM

## 2018-08-27 PROCEDURE — 99024 POSTOP FOLLOW-UP VISIT: CPT | Performed by: SURGERY

## 2018-08-27 NOTE — PROGRESS NOTES
construction      self employed     Social History Main Topics    Smoking status: Never Smoker    Smokeless tobacco: Never Used    Alcohol use Yes      Comment: Occasional    Drug use: No    Sexual activity: Not on file     Other Topics Concern    Not on file     Social History Narrative    No narrative on file       OBJECTIVE:  Physical Exam    Wound well healed without signs of active infection. Suture line intact. Abdomen soft, nontender, nondistended. Path reveals:   Final Pathologic Diagnosis:  Terminal ileum, appendix and right colon; resection:  -     Sessile tubular adenoma with focal villous and  hyperplastic features (2.5 cm) of cecum. -     Terminal ileum and appendix showing no significant  histopathologic change. -     Benign mesenteric lymph nodes (x 11). ASSESSMENT:  Patient doing well on this post operative check. Wounds well healed. 1. Cecum mass        PLAN:  Continue same  Increase activity as tolerated        No orders of the defined types were placed in this encounter. No orders of the defined types were placed in this encounter. Follow Up: Return in about 2 weeks (around 9/10/2018).     Jose Whitlock MD

## 2018-08-29 ENCOUNTER — PROCEDURE VISIT (OUTPATIENT)
Dept: CARDIOLOGY CLINIC | Age: 63
End: 2018-08-29

## 2018-08-29 DIAGNOSIS — I48.19 PERSISTENT ATRIAL FIBRILLATION (HCC): Primary | ICD-10-CM

## 2018-08-29 LAB
LV EF: 58 %
LVEF MODALITY: NORMAL

## 2018-08-29 PROCEDURE — 93306 TTE W/DOPPLER COMPLETE: CPT | Performed by: INTERNAL MEDICINE

## 2018-09-04 ENCOUNTER — TELEPHONE (OUTPATIENT)
Dept: CARDIOLOGY CLINIC | Age: 63
End: 2018-09-04

## 2018-09-04 NOTE — TELEPHONE ENCOUNTER
Normal left ventricle structure and function.   Ejection fraction is visually estimated at 55-60%.  Mildly dilated left atrium.   Sclerotic, but non-stenotic aortic valve.   Minimal mitral annular calcification.   No evidence of pericardial effusion. Advised pt of echo results.

## 2018-09-05 ENCOUNTER — OFFICE VISIT (OUTPATIENT)
Dept: INTERNAL MEDICINE CLINIC | Age: 63
End: 2018-09-05

## 2018-09-05 VITALS
HEART RATE: 75 BPM | DIASTOLIC BLOOD PRESSURE: 62 MMHG | RESPIRATION RATE: 17 BRPM | SYSTOLIC BLOOD PRESSURE: 122 MMHG | OXYGEN SATURATION: 97 %

## 2018-09-05 DIAGNOSIS — I48.19 PERSISTENT ATRIAL FIBRILLATION (HCC): Primary | ICD-10-CM

## 2018-09-05 DIAGNOSIS — I10 ESSENTIAL HYPERTENSION: ICD-10-CM

## 2018-09-05 DIAGNOSIS — D12.6 TUBULAR ADENOMA OF COLON: ICD-10-CM

## 2018-09-05 PROBLEM — K63.89 CECUM MASS: Status: RESOLVED | Noted: 2018-08-10 | Resolved: 2018-09-05

## 2018-09-05 PROCEDURE — 1123F ACP DISCUSS/DSCN MKR DOCD: CPT | Performed by: INTERNAL MEDICINE

## 2018-09-05 PROCEDURE — G8417 CALC BMI ABV UP PARAM F/U: HCPCS | Performed by: INTERNAL MEDICINE

## 2018-09-05 PROCEDURE — 4040F PNEUMOC VAC/ADMIN/RCVD: CPT | Performed by: INTERNAL MEDICINE

## 2018-09-05 PROCEDURE — G8427 DOCREV CUR MEDS BY ELIG CLIN: HCPCS | Performed by: INTERNAL MEDICINE

## 2018-09-05 PROCEDURE — 1111F DSCHRG MED/CURRENT MED MERGE: CPT | Performed by: INTERNAL MEDICINE

## 2018-09-05 PROCEDURE — 1101F PT FALLS ASSESS-DOCD LE1/YR: CPT | Performed by: INTERNAL MEDICINE

## 2018-09-05 PROCEDURE — 99213 OFFICE O/P EST LOW 20 MIN: CPT | Performed by: INTERNAL MEDICINE

## 2018-09-05 PROCEDURE — 1036F TOBACCO NON-USER: CPT | Performed by: INTERNAL MEDICINE

## 2018-09-05 PROCEDURE — 3017F COLORECTAL CA SCREEN DOC REV: CPT | Performed by: INTERNAL MEDICINE

## 2018-09-05 RX ORDER — METOPROLOL TARTRATE 50 MG/1
50 TABLET, FILM COATED ORAL 2 TIMES DAILY
Qty: 180 TABLET | Refills: 1 | Status: SHIPPED | OUTPATIENT
Start: 2018-09-05 | End: 2018-09-05 | Stop reason: SDUPTHER

## 2018-09-20 ENCOUNTER — OFFICE VISIT (OUTPATIENT)
Dept: SURGERY | Age: 63
End: 2018-09-20

## 2018-09-20 VITALS
BODY MASS INDEX: 30.44 KG/M2 | WEIGHT: 250 LBS | SYSTOLIC BLOOD PRESSURE: 122 MMHG | DIASTOLIC BLOOD PRESSURE: 78 MMHG | HEIGHT: 76 IN | HEART RATE: 80 BPM

## 2018-09-20 DIAGNOSIS — K63.89 CECUM MASS: Primary | ICD-10-CM

## 2018-09-20 PROCEDURE — 99024 POSTOP FOLLOW-UP VISIT: CPT | Performed by: SURGERY

## 2018-09-28 RX ORDER — METOPROLOL SUCCINATE 50 MG/1
TABLET, EXTENDED RELEASE ORAL
Qty: 90 TABLET | Refills: 2 | Status: SHIPPED | OUTPATIENT
Start: 2018-09-28 | End: 2018-10-01 | Stop reason: ALTCHOICE

## 2018-10-01 ENCOUNTER — OFFICE VISIT (OUTPATIENT)
Dept: INTERNAL MEDICINE CLINIC | Age: 63
End: 2018-10-01
Payer: MEDICARE

## 2018-10-01 VITALS
BODY MASS INDEX: 30.55 KG/M2 | OXYGEN SATURATION: 97 % | HEART RATE: 90 BPM | DIASTOLIC BLOOD PRESSURE: 82 MMHG | SYSTOLIC BLOOD PRESSURE: 112 MMHG | RESPIRATION RATE: 15 BRPM | WEIGHT: 251 LBS

## 2018-10-01 DIAGNOSIS — I48.19 PERSISTENT ATRIAL FIBRILLATION (HCC): ICD-10-CM

## 2018-10-01 DIAGNOSIS — I10 ESSENTIAL HYPERTENSION: Primary | ICD-10-CM

## 2018-10-01 PROCEDURE — 99213 OFFICE O/P EST LOW 20 MIN: CPT | Performed by: INTERNAL MEDICINE

## 2018-10-01 PROCEDURE — 4040F PNEUMOC VAC/ADMIN/RCVD: CPT | Performed by: INTERNAL MEDICINE

## 2018-10-01 PROCEDURE — 1123F ACP DISCUSS/DSCN MKR DOCD: CPT | Performed by: INTERNAL MEDICINE

## 2018-10-01 PROCEDURE — G8427 DOCREV CUR MEDS BY ELIG CLIN: HCPCS | Performed by: INTERNAL MEDICINE

## 2018-10-01 PROCEDURE — G8484 FLU IMMUNIZE NO ADMIN: HCPCS | Performed by: INTERNAL MEDICINE

## 2018-10-01 PROCEDURE — 3017F COLORECTAL CA SCREEN DOC REV: CPT | Performed by: INTERNAL MEDICINE

## 2018-10-01 PROCEDURE — 1036F TOBACCO NON-USER: CPT | Performed by: INTERNAL MEDICINE

## 2018-10-01 PROCEDURE — 1101F PT FALLS ASSESS-DOCD LE1/YR: CPT | Performed by: INTERNAL MEDICINE

## 2018-10-01 PROCEDURE — G8417 CALC BMI ABV UP PARAM F/U: HCPCS | Performed by: INTERNAL MEDICINE

## 2018-10-01 RX ORDER — AMLODIPINE BESYLATE 5 MG/1
5 TABLET ORAL DAILY
Qty: 90 TABLET | Refills: 1 | Status: SHIPPED | OUTPATIENT
Start: 2018-10-01 | End: 2019-03-01 | Stop reason: SDUPTHER

## 2018-10-22 NOTE — PROGRESS NOTES
Chief Complaint   Patient presents with    Post-Op Check     2nd P/O Robotic Right Hemicolectomy, 8/10/18         SUBJECTIVE:  Patient here for post op visit. Pain is minimal.  Wounds: minbruising and no discharge.     Past Surgical History:   Procedure Laterality Date    COLECTOMY  08/07/2018    COLONOSCOPY  06/26/2018    Grade 2 Internal hemorrhoids, 4cm polyp - surgical consult needed    DENTAL SURGERY  11/2016    full dental restoration    EYE SURGERY Bilateral 2013    cataracts    HEMICOLECTOMY  08/10/2018    robotic    HERNIA REPAIR  early [id number]'[id number]    Umbilical     VASECTOMY  1982     Past Medical History:   Diagnosis Date    Arthritis     feet, elbows, fingers    BPH (benign prostatic hypertrophy)     Depression     Erectile dysfunction     Family history of diabetes mellitus     mother brothers/sisters    Gout, unspecified     last flare  April-May 2018    History of exercise stress test 05/04/2017    treadmill    Hx of Doppler echocardiogram 08/29/2018    [id number]%,mildly dilated left atrium    Hyperlipidemia     Hypertension     Impaired fasting glucose     prediabetic    New onset atrial fibrillation ([address] 04/13/2017    CHRONICALLY IRREG--- 4/12/17 ECG by PCP on routine visit- seeing Dr Shay Bolivar, on toprol and ASA, no anticoagulation w low CHADS    Other testicular hypofunction     Peripheral neuropathy     ?possibly fr etoh- mild intake ~4-6 [place] iced teas/week    Persistent atrial fibrillation ([address] 11/9/2017    Sleep disturbance, unspecified     Tubular adenoma of colon 06/26/2018    Dr Vonnie Almanzar, recheck one yr     Family History   Problem Relation Age of Onset    Heart Disease Mother     Diabetes Mother     Heart Disease Father     Diabetes Sister     Diabetes Brother     Heart Disease Brother     Heart Surgery Brother         CABG    Diabetes Brother     Diabetes Brother      Social History     Social History    Marital status:      Spouse name: N/A    Number

## 2019-01-01 DIAGNOSIS — E78.2 MIXED HYPERLIPIDEMIA: ICD-10-CM

## 2019-01-02 RX ORDER — ATORVASTATIN CALCIUM 10 MG/1
TABLET, FILM COATED ORAL
Qty: 90 TABLET | Refills: 0 | Status: SHIPPED | OUTPATIENT
Start: 2019-01-02 | End: 2019-03-29 | Stop reason: SDUPTHER

## 2019-01-29 ENCOUNTER — OFFICE VISIT (OUTPATIENT)
Dept: INTERNAL MEDICINE CLINIC | Age: 64
End: 2019-01-29
Payer: MEDICARE

## 2019-01-29 VITALS
OXYGEN SATURATION: 97 % | BODY MASS INDEX: 32.26 KG/M2 | SYSTOLIC BLOOD PRESSURE: 131 MMHG | WEIGHT: 265 LBS | HEART RATE: 90 BPM | RESPIRATION RATE: 15 BRPM | DIASTOLIC BLOOD PRESSURE: 77 MMHG

## 2019-01-29 DIAGNOSIS — M70.22 OLECRANON BURSITIS OF LEFT ELBOW: ICD-10-CM

## 2019-01-29 DIAGNOSIS — M25.522 LEFT ELBOW PAIN: Primary | ICD-10-CM

## 2019-01-29 DIAGNOSIS — G56.22 ULNAR NEUROPATHY OF LEFT UPPER EXTREMITY: ICD-10-CM

## 2019-01-29 PROCEDURE — G8417 CALC BMI ABV UP PARAM F/U: HCPCS | Performed by: INTERNAL MEDICINE

## 2019-01-29 PROCEDURE — 1101F PT FALLS ASSESS-DOCD LE1/YR: CPT | Performed by: INTERNAL MEDICINE

## 2019-01-29 PROCEDURE — G8427 DOCREV CUR MEDS BY ELIG CLIN: HCPCS | Performed by: INTERNAL MEDICINE

## 2019-01-29 PROCEDURE — 99213 OFFICE O/P EST LOW 20 MIN: CPT | Performed by: INTERNAL MEDICINE

## 2019-01-29 PROCEDURE — G8484 FLU IMMUNIZE NO ADMIN: HCPCS | Performed by: INTERNAL MEDICINE

## 2019-01-29 PROCEDURE — 1036F TOBACCO NON-USER: CPT | Performed by: INTERNAL MEDICINE

## 2019-01-29 PROCEDURE — 3017F COLORECTAL CA SCREEN DOC REV: CPT | Performed by: INTERNAL MEDICINE

## 2019-01-29 PROCEDURE — 4040F PNEUMOC VAC/ADMIN/RCVD: CPT | Performed by: INTERNAL MEDICINE

## 2019-01-29 PROCEDURE — 1123F ACP DISCUSS/DSCN MKR DOCD: CPT | Performed by: INTERNAL MEDICINE

## 2019-01-29 RX ORDER — PREDNISONE 1 MG/1
TABLET ORAL
Qty: 36 TABLET | Refills: 0 | Status: ON HOLD | OUTPATIENT
Start: 2019-01-29 | End: 2019-05-09 | Stop reason: HOSPADM

## 2019-02-20 ENCOUNTER — TELEPHONE (OUTPATIENT)
Dept: CARDIOLOGY CLINIC | Age: 64
End: 2019-02-20

## 2019-02-20 ENCOUNTER — OFFICE VISIT (OUTPATIENT)
Dept: CARDIOLOGY CLINIC | Age: 64
End: 2019-02-20
Payer: MEDICARE

## 2019-02-20 VITALS
DIASTOLIC BLOOD PRESSURE: 80 MMHG | WEIGHT: 261 LBS | BODY MASS INDEX: 31.77 KG/M2 | HEART RATE: 76 BPM | SYSTOLIC BLOOD PRESSURE: 128 MMHG

## 2019-02-20 DIAGNOSIS — I48.19 PERSISTENT ATRIAL FIBRILLATION (HCC): Primary | ICD-10-CM

## 2019-02-20 DIAGNOSIS — E78.2 MIXED HYPERLIPIDEMIA: ICD-10-CM

## 2019-02-20 DIAGNOSIS — I10 ESSENTIAL HYPERTENSION: ICD-10-CM

## 2019-02-20 PROCEDURE — G8428 CUR MEDS NOT DOCUMENT: HCPCS | Performed by: INTERNAL MEDICINE

## 2019-02-20 PROCEDURE — 99214 OFFICE O/P EST MOD 30 MIN: CPT | Performed by: INTERNAL MEDICINE

## 2019-02-20 PROCEDURE — 3017F COLORECTAL CA SCREEN DOC REV: CPT | Performed by: INTERNAL MEDICINE

## 2019-02-20 PROCEDURE — 1101F PT FALLS ASSESS-DOCD LE1/YR: CPT | Performed by: INTERNAL MEDICINE

## 2019-02-20 PROCEDURE — G8484 FLU IMMUNIZE NO ADMIN: HCPCS | Performed by: INTERNAL MEDICINE

## 2019-02-20 PROCEDURE — 1036F TOBACCO NON-USER: CPT | Performed by: INTERNAL MEDICINE

## 2019-02-20 PROCEDURE — G8417 CALC BMI ABV UP PARAM F/U: HCPCS | Performed by: INTERNAL MEDICINE

## 2019-02-20 PROCEDURE — 1123F ACP DISCUSS/DSCN MKR DOCD: CPT | Performed by: INTERNAL MEDICINE

## 2019-02-20 PROCEDURE — 4040F PNEUMOC VAC/ADMIN/RCVD: CPT | Performed by: INTERNAL MEDICINE

## 2019-02-25 ENCOUNTER — TELEPHONE (OUTPATIENT)
Dept: CARDIOLOGY CLINIC | Age: 64
End: 2019-02-25

## 2019-02-27 ENCOUNTER — TELEPHONE (OUTPATIENT)
Dept: CARDIOLOGY CLINIC | Age: 64
End: 2019-02-27

## 2019-03-01 DIAGNOSIS — I10 ESSENTIAL HYPERTENSION: ICD-10-CM

## 2019-03-01 RX ORDER — LISINOPRIL 20 MG/1
20 TABLET ORAL DAILY
Qty: 90 TABLET | Refills: 1 | Status: SHIPPED | OUTPATIENT
Start: 2019-03-01 | End: 2019-07-18 | Stop reason: SDUPTHER

## 2019-03-01 RX ORDER — AMLODIPINE BESYLATE 5 MG/1
5 TABLET ORAL DAILY
Qty: 90 TABLET | Refills: 1 | Status: SHIPPED | OUTPATIENT
Start: 2019-03-01 | End: 2019-06-27 | Stop reason: SDUPTHER

## 2019-03-29 DIAGNOSIS — E78.2 MIXED HYPERLIPIDEMIA: ICD-10-CM

## 2019-04-01 RX ORDER — ATORVASTATIN CALCIUM 10 MG/1
TABLET, FILM COATED ORAL
Qty: 90 TABLET | Refills: 0 | Status: SHIPPED | OUTPATIENT
Start: 2019-04-01 | End: 2019-07-01 | Stop reason: SDUPTHER

## 2019-04-17 ENCOUNTER — INITIAL CONSULT (OUTPATIENT)
Dept: CARDIOLOGY CLINIC | Age: 64
End: 2019-04-17
Payer: MEDICARE

## 2019-04-17 VITALS
RESPIRATION RATE: 14 BRPM | DIASTOLIC BLOOD PRESSURE: 86 MMHG | HEART RATE: 96 BPM | HEIGHT: 76 IN | SYSTOLIC BLOOD PRESSURE: 134 MMHG | WEIGHT: 259.6 LBS | BODY MASS INDEX: 31.61 KG/M2 | OXYGEN SATURATION: 98 %

## 2019-04-17 DIAGNOSIS — I48.19 PERSISTENT ATRIAL FIBRILLATION (HCC): Primary | ICD-10-CM

## 2019-04-17 PROCEDURE — 93000 ELECTROCARDIOGRAM COMPLETE: CPT | Performed by: INTERNAL MEDICINE

## 2019-04-17 PROCEDURE — 4040F PNEUMOC VAC/ADMIN/RCVD: CPT | Performed by: INTERNAL MEDICINE

## 2019-04-17 PROCEDURE — 99204 OFFICE O/P NEW MOD 45 MIN: CPT | Performed by: INTERNAL MEDICINE

## 2019-04-17 PROCEDURE — 1036F TOBACCO NON-USER: CPT | Performed by: INTERNAL MEDICINE

## 2019-04-17 PROCEDURE — G8417 CALC BMI ABV UP PARAM F/U: HCPCS | Performed by: INTERNAL MEDICINE

## 2019-04-17 PROCEDURE — 1123F ACP DISCUSS/DSCN MKR DOCD: CPT | Performed by: INTERNAL MEDICINE

## 2019-04-17 PROCEDURE — G8427 DOCREV CUR MEDS BY ELIG CLIN: HCPCS | Performed by: INTERNAL MEDICINE

## 2019-04-17 PROCEDURE — 3017F COLORECTAL CA SCREEN DOC REV: CPT | Performed by: INTERNAL MEDICINE

## 2019-04-17 NOTE — PROGRESS NOTES
Called [employer] talked to [employer] and Darryl Fam is not covered and will cost patient $463. So we are going with sotalol loading.

## 2019-04-17 NOTE — PROGRESS NOTES
Electrophysiology Consult Note      Reason for consultation: atrial fibrillation    Chief complaint : atrial fibrillation    Referring physician:      Primary care physician: Sylvie Castle MD      History of Present Illness:     Chief Complaint   Patient presents with   Gladys Lighter Atrial Fibrillation     Dr Quincy Amador patient here for consult for Afib. Patient denies palpitations, chest pain. He reports he is usual self. Reports tiredness and weakness and he attributes to his age at times. Patient has no shortness of breath at rest but occasional with exertion. Patient denies dizziness or syncope Patient states he was unaware he had afib when his primary care diagnosed it one year ago. Patient reports alcohol occassionally as well as 1 cup of coffee daily.          Pastmedical history:   Past Medical History:   Diagnosis Date    Arthritis     feet, elbows, fingers    BPH (benign prostatic hypertrophy)     Depression     Erectile dysfunction     Family history of diabetes mellitus     mother brothers/sisters    Gout, unspecified     last flare  April-May 2018    History of exercise stress test 05/04/2017    treadmill    Hx of Doppler echocardiogram 08/29/2018    [id number]%,mildly dilated left atrium    Hyperlipidemia     Hypertension     Impaired fasting glucose     prediabetic    New onset atrial fibrillation ([address] 04/13/2017    CHRONICALLY IRREG--- 4/12/17 ECG by PCP on routine visit- seeing Dr Quincy Amador, on toprol and ASA, no anticoagulation w low CHADS    Other testicular hypofunction     Peripheral neuropathy     ?possibly fr etoh- mild intake ~4-6 [place] iced teas/week    Persistent atrial fibrillation ([address] 11/9/2017    Sleep disturbance, unspecified     Tubular adenoma of colon 06/26/2018    Dr Jeff Dennis, recheck one yr       Surgical history :   Past Surgical History:   Procedure Laterality Date    COLECTOMY  08/07/2018    COLONOSCOPY  06/26/2018 Grade 2 Internal hemorrhoids, 4cm polyp - surgical consult needed    DENTAL SURGERY  11/2016    full dental restoration    EYE SURGERY Bilateral 2013    cataracts    HEMICOLECTOMY  08/10/2018    robotic   [address]  early [address]    Umbilical     VASECTOMY  1982       Family history:   Family History   Problem Relation Age of Onset    Heart Disease Mother     Diabetes Mother     Heart Disease Father     Diabetes Sister     Diabetes Brother     Heart Disease Brother     Heart Surgery Brother         CABG    Diabetes Brother     Diabetes Brother        Social history :  reports that he has never smoked. He has never used smokeless tobacco. He reports that he drinks alcohol. He reports that he does not use drugs. No Known Allergies    Current Outpatient Medications on File Prior to Visit   Medication Sig Dispense Refill    atorvastatin (LIPITOR) 10 MG tablet TAKE 1 TABLET BY MOUTH ONE TIME A DAY  90 tablet 0    lisinopril (PRINIVIL;ZESTRIL) 20 MG tablet Take 1 tablet by mouth daily 90 tablet 1    amLODIPine (NORVASC) 5 MG tablet Take 1 tablet by mouth daily 90 tablet 1    predniSONE (DELTASONE) 5 MG tablet Take 8 pills, then 7,6,5,4,3,2,1 36 tablet 0    metoprolol tartrate (LOPRESSOR) 25 MG tablet TAKE 1 TABLET BY MOUTH TWICE DAILY 180 tablet 0    allopurinol (ZYLOPRIM) 300 MG tablet Take 1 tablet by mouth daily 90 tablet 1    melatonin (RA MELATONIN) 3 MG TABS tablet Take 1 tablet by mouth daily (Patient taking differently: Take 3 mg by mouth nightly as needed ) 90 tablet 0    terazosin (HYTRIN) 1 MG capsule Take 1 capsule by mouth nightly 90 capsule 1    indomethacin (INDOCIN) 50 MG capsule Take 1 capsule by mouth 3 times daily (Patient taking differently: Take 50 mg by mouth as needed ) 180 capsule 1    aspirin 325 MG tablet Take 325 mg by mouth every morning  30 tablet      No current facility-administered medications on file prior to visit.         Review of Systems:   Review of Systems   Constitutional: Positive for fatigue. Negative for activity change, chills and fever. HENT: Negative for congestion, ear pain and tinnitus. Eyes: Negative for photophobia, pain and visual disturbance. Respiratory: Negative for cough, chest tightness, shortness of breath and wheezing. Cardiovascular: Negative for chest pain, palpitations and leg swelling. Gastrointestinal: Negative for abdominal pain, blood in stool, constipation, diarrhea, nausea and vomiting. Endocrine: Negative for cold intolerance and heat intolerance. Genitourinary: Negative for dysuria, flank pain and hematuria. Musculoskeletal: Negative for arthralgias, back pain, myalgias and neck stiffness. Skin: Negative for color change and rash. Allergic/Immunologic: Negative for food allergies. Neurological: Negative for dizziness, light-headedness, numbness and headaches. Hematological: Does not bruise/bleed easily. Psychiatric/Behavioral: Negative for agitation, behavioral problems and confusion. Physical Examination:    /86   Pulse 96   Resp 14   Ht 6' 4\" (1.93 m)   Wt 259 lb 9.6 oz (117.8 kg)   SpO2 98%   BMI 31.60 kg/m²    Wt Readings from Last 3 Encounters:   04/17/19 259 lb 9.6 oz (117.8 kg)   02/20/19 261 lb (118.4 kg)   01/29/19 265 lb (120.2 kg)     Body mass index is 31.6 kg/m². Physical Exam   Constitutional: He is oriented to person, place, and time. He appears well-developed and well-nourished. No distress. HENT:   Head: Normocephalic and atraumatic. Eyes: Pupils are equal, round, and reactive to light. EOM are normal.   Neck: Normal range of motion. No JVD present. Cardiovascular: Exam reveals no friction rub. No murmur heard. Irregular rhythm and rate   Pulmonary/Chest: Effort normal and breath sounds normal. No respiratory distress. He has no wheezes. He has no rales. Abdominal: Soft. Bowel sounds are normal. He exhibits no distension. There is no tenderness. Musculoskeletal: He exhibits no edema or tenderness. Neurological: He is alert and oriented to person, place, and time. No cranial nerve deficit. Skin: Skin is warm and dry. Psychiatric: He has a normal mood and affect. CBC:   Lab Results   Component Value Date    WBC 10.9 08/11/2018    HGB 12.4 08/11/2018    HCT 38.0 08/11/2018     08/11/2018     Lipids:  Lab Results   Component Value Date    CHOL 190 05/22/2018    TRIG 171 (H) 05/22/2018    HDL 41 05/22/2018    LDLCALC 115 (H) 05/22/2018     PT/INR: No results found for: INR     BMP:    Lab Results   Component Value Date     (L) 08/11/2018    K 4.4 08/11/2018    CL 98 (L) 08/11/2018    CO2 25 08/11/2018    BUN 21 08/11/2018     CMP:   Lab Results   Component Value Date    AST 19 05/22/2018    PROT 7.4 05/22/2018    BILITOT 0.5 05/22/2018    ALKPHOS 88 05/22/2018     TSH:  No results found for: TSH    EKGINTERPRETATION - EKG Interpretation:  Atrial fibrillation      IMPRESSION / RECOMMENDATIONS:     1. Persistent atrial fibrillation  2. HTN  3. HLD  4. BPH    Patient over all not very symptomatic but reports over all effieiciency is decreasing in last one year  Patient has CHADS-VAS of 2 (age and HTN)   Discussed risk with stroke in atrial fibrillation vs bleeding on anticoagulation  Aspirin vs anticoagulation    Wants to consider anticoagulation On xarelto   Recommend KARIS/Cardioversion to see if we can keep him in rhythm  Will consider antiarrhythmic post cardioversion - possibly tikosyn. Thanks again for allowing me to participate in care of this patient. Please call me if you have any questions. With best regards.       Carla Valenzuela MD, 4/17/2019 4:27 PM

## 2019-04-17 NOTE — LETTER
? Chest Prep> Clip hair anterior chest and posterior back. ? Groin Prep> Clip hair bilateral groins. Physician Signature:_________________________Date:____________Time:___________                                         [facility] ([facility]) Informed Consent for Anesthesia/Sedation, Surgery, Invasive Procedures, and other High-risk Interventions and Medication use     *This consent is applicable for 30 days following patient signature*    Procedure(s)   INeftali authorize, Dr. Berta Srivastava   and the associate(s) or assistant(s) of his/her choice, to perform the following procedure(s): Atrial Fibrillation  Ablation with transesophageal echocardiogram     I know that unexpected conditions may require additional or different procedures than those above. I authorize the above named practitioner(s) perform these as necessary and desirable. This is based on the practitioners professional judgment. The above named practitioner has discussed the above procedure(s) with me, including:  ? Potential benefits, including likelihood of success of the procedure(s) goals  ? Risks  ? Side effects, risk of death, and risk of infection  ? Any potential problems that might occur during recuperation or healing post-procedure  ? Reasonable alternatives  ? Risks of NOT performing the procedure(s)    I acknowledge that no warranty or guarantee has been made to the results the procedure(s). I consent to the above named practitioner(s) providing additional services to me as deemed reasonable and necessary, including but not limited to:    ? Use of medications for anesthesia or sedation. ? All anesthesia and sedation carry risks. My practitioner has discussed my anticipated anesthesia and/or sedation and the risks of using, risk of not using, benefits, side effects, and alternatives. ? Use of pathology  ? I authorize [facility] ([facility]) to dispose of tissues, specimens or organs when pathology is complete. If patient unable to sign, has engaged the [employer], is a minor, or has a court-appointed Guardian:  [facility] Representative Name (Print):  ____________________________________      Relationship ([place] one):    Guardian   Parent    Spouse    HCPOA   Child   Sibling  Next-of-Kin Friend    Patients Representative Signature: _______________________________________              Date: ______________  Time: __________    An  was used.  name/ID: _________________________________      [address]________  Date: ________   Time: _________    Physician/Practitioner _______________________  Date: ________   Time: _________           Revision 2017            Delta Ly     Dr. Champ Johnson PATIENT NAME:    Christine Peace SrKuldip                               :   1955  PROCEDURE: Atrial Fibrillation  Ablation with transesophageal echocardiogram      DATE OF PROCEDURE:   DIAGNOSIS:   Atrial fibrillation    X MAG    X PHOS       X CBC       X  BMP    X    PT   X  PTT       X     Chest x-Ray PA & Lateral View      ? PLEASE CALL ABNORMAL RESULTS TO THE REQUESTING PHYSICIAN? ATTENTION PATIENTS:  You do not have to fast for the lab work. You must go to the [address] [place] [address] or [facility]  to have the lab work done.         Physician Signature:_________________Date:_____________Time:___________                                Delta Ly     Dr. Champ Johnson     PROCEDURE TO SCHEDULE:    Atrial Fibrillation Ablation with transesophageal echocardiogram     Patient Name: Сергей Raya  : 1955   MRN# [id number]    Home Phone Number: [phone]   Weight:    Wt Readings from Last 3 Encounters:   19 259 lb 9.6 oz (117.8 kg)   19 261 lb (118.4 kg)   19 265 lb (120.2 kg)        Insurance: Payor: MEDICARE / Plan: MEDICARE PART A AND B / Product Type: *No Product type* /     Date of Procedure:   Time:    Arrival Time:     Diagnosis:  Atrial fibrillation   Allergies: No Known Allergies     1) Call [facility] scheduling ([phone]) or [address] [facility]    PHONE OR   INSTANT MESSAGE  2) PREAUTHORIZATION NUMBER:   Spoke to:     From date:    expiration date:       Kaden Harrell

## 2019-04-17 NOTE — LETTER
Asuncion Humphreys     Transesophageal Echocardiogram with Cardioversion with Tikosyn loading     Patient Name: Lyndsey August  : 1955   MRN# [id number]     Date of Procedure: 19 Time: 1200 Arrival Time: [address]: [facility])     Call to [facility] at [phone] 2 days before your procedure    X Please do not have anything by mouth after midnight prior to or 8 hours before the procedure. X You may take your medication with a sip of water unless advised otherwise below. X  Please continue to take Xarelto (rivaroxaban) as directed. Patient Signature:____________________________ Staff Prepared: Darian Eason     Staff Given Instructions:_______________________________                                                    [facility]  What is cardioversion? Cardioversion helps your heart return to a normal rhythm. It treats problems like atrial fibrillation. It is also sometimes used in emergencies. It can correct a fast heartbeat that causes low blood pressure, chest pain, or heart failure. Your doctor may ask you to take medicines before the treatment. These help prevent blood clots. Your doctor will watch you closely to make sure that there are no problems. Electrical cardioversion: The electrical procedure is done in a hospital. You will get medicine to help you relax and control the pain. Your doctor will put paddles or patches on your chest and back. These send an electric current to your heart. This resets your heart rhythm. The electrical part takes about 5 minutes. But you will probably be in the hospital for 1 to 2 hours. You will need to recover from the effects of the pain medicine.   Chemical cardioversion: The chemical procedure is most often done in a hospital. In most cases, the medicine is put into your arm through a tube ? All anesthesia and sedation carry risks. My practitioner has discussed my anticipated anesthesia and/or sedation and the risks of using, risk of not using, benefits, side effects, and alternatives. ? Use of pathology  ? I authorize [facility] ([facility]) to dispose of tissues, specimens or organs when pathology is complete. ? Use of radiology  ? A contrast agent may be required for radiology procedures. My practitioner has advised me of the risks of using, risks of not using, benefits, side effects, and alternatives. ? Observers or use of photography, video/audio recording, or televising of the procedure(s). This is for medical, scientific, or educational purposes. This includes appropriate portions of my body. My identity will not be revealed. ? I consent to release of my social security number and other identifying information to [employer] (FDA), and the supplier/, if I receive tissue, a device, or implant. This is to track the tissue, device, or implant for defect, recall, infection, etc.     ? Use of blood and/or blood products, if needed, through my hospital stay. My practitioner has advised me of the risks of using, risks of not using, benefits, side effects, and alternatives. ___ I do NOT want Blood or Blood products given. (Complete separate  refusal form)    Code Status ([place] one):  ___ I do NOT HAVE a DNR order. I am a Full-code.   I will receive CPR, intubation,  chest compressions, medications, and/or other life saving measures if I have a  cardiac or respiratory arrest.    ___ I have a Do Not Resuscitate (DNR)order.   (florencia one below)  ___  I rescind my DNR for surgery and immediate post-operative period through Phase 2 recovery.    This means, for that time period, I will be a Full-code and receive CPR, intubation, chest compressions, medications, and/or other life saving measures, if I have a cardiac or respiratory arrest.    ___ I WANT to keep my DNR in effect during my procedure(s) and immediate post-operative recovery period through Phase 2 recovery. (Complete separate refusal form)     This form has been fully explained to me. I understand its contents. Patients Signature: ___________________________Date: ________  Time: ________    If patient unable to sign, has engaged the [address] [place] [address], is a minor, or has a court-appointed Guardian:  [facility] Representative Name (Print):  ____________________________________      Relationship ([place] one):    Guardian   Parent    Spouse    HCPOA   Child   Sibling  Next-of-Kin Friend    Patients Representative Signature: _______________________________________              Date: ______________  Time: __________    An  was used.    name/ID: _________________________________      [address]________________________  Date: ________   Time: _________    Physician/Practitioner _______________________  Date: ________   Time: _________           Revision 2017      Eliazar Tracey     PROCEDURE TO SCHEDULE:    Transesophageal Echocardiogram with Cardioversion with Tikosyn loading     Patient Name: Maisha Macdonald  : 1955   MRN# [id number]    Home Phone Number: [phone]   Weight:    Wt Readings from Last 3 Encounters:   19 259 lb 9.6 oz (117.8 kg)   19 261 lb (118.4 kg)   19 265 lb (120.2 kg)        Insurance: Payor: Cornell Dennis / Plan: MEDICARE PART A AND B / Product Type: *No Product type* /     Date of Procedure: 19 Time: 1200 Arrival Time: 1000    Diagnosis:  Atrial fibrillation   Allergies: No Known Allergies       1) Call [address] [place] [address] scheduling ([phone]) or Instant Message  CONFIRMED WITH    PHONE OR   INSTANT MESSAGE  2) PREAUTHORIZATION NUMBER:   Spoke to:     From date:    expiration date:         Misael Malin

## 2019-04-24 ENCOUNTER — OFFICE VISIT (OUTPATIENT)
Dept: CARDIOLOGY CLINIC | Age: 64
End: 2019-04-24
Payer: MEDICARE

## 2019-04-24 VITALS
BODY MASS INDEX: 31.54 KG/M2 | SYSTOLIC BLOOD PRESSURE: 132 MMHG | DIASTOLIC BLOOD PRESSURE: 68 MMHG | HEIGHT: 76 IN | HEART RATE: 84 BPM | WEIGHT: 259 LBS

## 2019-04-24 DIAGNOSIS — I10 ESSENTIAL HYPERTENSION: ICD-10-CM

## 2019-04-24 DIAGNOSIS — E78.2 MIXED HYPERLIPIDEMIA: ICD-10-CM

## 2019-04-24 DIAGNOSIS — I48.19 PERSISTENT ATRIAL FIBRILLATION (HCC): Primary | ICD-10-CM

## 2019-04-24 PROCEDURE — G8427 DOCREV CUR MEDS BY ELIG CLIN: HCPCS | Performed by: INTERNAL MEDICINE

## 2019-04-24 PROCEDURE — 1123F ACP DISCUSS/DSCN MKR DOCD: CPT | Performed by: INTERNAL MEDICINE

## 2019-04-24 PROCEDURE — 3017F COLORECTAL CA SCREEN DOC REV: CPT | Performed by: INTERNAL MEDICINE

## 2019-04-24 PROCEDURE — G8417 CALC BMI ABV UP PARAM F/U: HCPCS | Performed by: INTERNAL MEDICINE

## 2019-04-24 PROCEDURE — 99213 OFFICE O/P EST LOW 20 MIN: CPT | Performed by: INTERNAL MEDICINE

## 2019-04-24 PROCEDURE — 1036F TOBACCO NON-USER: CPT | Performed by: INTERNAL MEDICINE

## 2019-04-24 PROCEDURE — 4040F PNEUMOC VAC/ADMIN/RCVD: CPT | Performed by: INTERNAL MEDICINE

## 2019-04-24 NOTE — PROGRESS NOTES
Mary Kuhnter  1955  Omaira Brown MD    Chief complaint and HPI:  Tiffani Dillon Sr.  49-year-old male following up for persistent atrial fibrillation. He had a consultation with Dr. Nadia Beltrán and I have reviewed his note from last week. He is being prepared the for admission for Tikosyn therapy next week and cardioversion. Patient has some anxiety and wants to know this the right approach and has several questions with regard to the medications and long-term side effects and back upon etc.    Rest of the Cardiovascular system review is otherwise unchanged from prior encounter. Past medical history:  has a past medical history of Arthritis, BPH (benign prostatic hypertrophy), Depression, Erectile dysfunction, Family history of diabetes mellitus, Gout, unspecified, History of exercise stress test, Hx of Doppler echocardiogram, Hyperlipidemia, Hypertension, Impaired fasting glucose, New onset atrial fibrillation ([address] Other testicular hypofunction, Peripheral neuropathy, Persistent atrial fibrillation ([address] Sleep disturbance, unspecified, and Tubular adenoma of colon. Past surgical history:  has a past surgical history that includes Vasectomy (1982); Dental surgery (11/2016); Colonoscopy (06/26/2018); hernia repair (early 2000's); eye surgery (Bilateral, 2013); hemicolectomy (08/10/2018); and colectomy (08/07/2018). Social History:   Social History     Tobacco Use    Smoking status: Never Smoker    Smokeless tobacco: Never Used   Substance Use Topics    Alcohol use: Yes     Comment: Occasional     Family history: family history includes Diabetes in his brother, brother, brother, mother, and sister; Heart Disease in his brother, father, and mother; Heart Surgery in his brother. ALLERGIES:  Patient has no known allergies. Prior to Admission medications    Medication Sig Start Date End Date Taking?  Authorizing Provider   rivaroxaban (XARELTO) 20 MG TABS tablet Take 1 tablet by mouth daily (with breakfast) 4/21/19  Yes Brian Dejesus MD   atorvastatin (LIPITOR) 10 MG tablet TAKE 1 TABLET BY MOUTH ONE TIME A DAY  4/1/19  Yes Brittany Aleman MD   lisinopril (PRINIVIL;ZESTRIL) 20 MG tablet Take 1 tablet by mouth daily 3/1/19  Yes Brittany Aleman MD   amLODIPine (NORVASC) 5 MG tablet Take 1 tablet by mouth daily 3/1/19  Yes Brittany Aleman MD   predniSONE (DELTASONE) 5 MG tablet Take 8 pills, then 7,6,5,4,3,2,1 1/29/19  Yes Brittany Aleman MD   metoprolol tartrate (LOPRESSOR) 25 MG tablet TAKE 1 TABLET BY MOUTH TWICE DAILY 1/7/19  Yes Brittany Aleman MD   allopurinol (ZYLOPRIM) 300 MG tablet Take 1 tablet by mouth daily 8/24/18  Yes Brittany Aleman MD   melatonin (RA MELATONIN) 3 MG TABS tablet Take 1 tablet by mouth daily  Patient taking differently: Take 3 mg by mouth nightly as needed  8/20/18  Yes Brittany Aleman MD   terazosin (HYTRIN) 1 MG capsule Take 1 capsule by mouth nightly 5/22/18  Yes Brittany Aleman MD   indomethacin (INDOCIN) 50 MG capsule Take 1 capsule by mouth 3 times daily  Patient taking differently: Take 50 mg by mouth as needed  10/16/17  Yes Brittany Aleman MD   aspirin 325 MG tablet Take 325 mg by mouth every morning  6/21/18  Yes Brittany Aleman MD     Vitals:    04/24/19 1614   BP: 132/68   Pulse: 84   Weight: 259 lb (117.5 kg)   Height: 6' 4\" (1.93 m)      Body mass index is 31.53 kg/m². Wt Readings from Last 3 Encounters:   04/24/19 259 lb (117.5 kg)   04/17/19 259 lb 9.6 oz (117.8 kg)   02/20/19 261 lb (118.4 kg)     Constitutional: Patient is well-built and nourished mildly overweight male in no apparent distress  Eyes: Pupils are equal.    NECK: No JVP or thyromegaly  Cardiovascular:  Auscultation: Irregular otherwise normal S1 and S2.  No murmurs or gallops noted. .  Carotids are negative for bruits.  Abdominal aorta is nonpalpable.  No epigastric bruit noted. Peripheral pulses: 2+ in both feet.   Respiratory:  Respiratory effort is normal.  Breath sounds are clear to auscultation. Extremities:  No edema, clubbing,  Cyanosis, petechiae. SKIN: Warm and well perfused, no pallor or cyanosis  Abdomen:  All the 5 puncture sites from robotic surgery are healing well.  Bowel sounds are normal.   Musculoskeletal:  No spinal deformities noted.  Gait is normal.  Muscle strength is normal.  Neurologic:  Oriented to time, place, and person and non-anxious. No focal neurological deficit noted. Psychiatric: Appears somewhat anxious    LAB REVIEW:    CBC:   Lab Results   Component Value Date    WBC 10.9 08/11/2018    HGB 12.4 08/11/2018    HCT 38.0 08/11/2018     08/11/2018     Lipids:   Lab Results   Component Value Date    CHOL 190 05/22/2018    TRIG 171 (H) 05/22/2018    HDL 41 05/22/2018    LDLCALC 115 (H) 05/22/2018     Renal:   Lab Results   Component Value Date    BUN 21 08/11/2018    CREATININE 1.1 08/11/2018     08/11/2018    K 4.4 08/11/2018     IMPRESSION and RECOMMENDATIONS:      Persistent atrial fibrillation ([address]  Follow-up with the the plan as per Dr. Ajith Gonzales. If it doesn't work we have other alternatives as discussed. Hypertension  Well-controlled on current medications. Continue the same. Hyperlipidemia  Repeat lipids in the near future until then continue current low-dose statin therapy. Follow up with Dr Ajith Gonzales next week for Cardioversion and Tikosyn. After visit summery is provided. Questions answered and patient verbalizes understanding. Follow up in office in 4-6 weeks post cardioversion with ECG, sooner if needed. Fabiana Lua MD, 4/24/2019 5:33 PM     Please note this report has been partially produced using speech recognition software and may contain errors related to that system including errors in grammar, punctuation, and spelling, as well as words and phrases that may be inappropriate. If there are any questions or concerns please feel free to contact the dictating provider for clarification.

## 2019-04-24 NOTE — ASSESSMENT & PLAN NOTE
Follow-up with the the plan as per Dr. Cristela Cote. If it doesn't work we have other alternatives as discussed.

## 2019-04-24 NOTE — PATIENT INSTRUCTIONS
Follow up with Dr Wale Lomax next week for Cardioversion and Tikosyn. After visit summery is provided. Questions answered and patient verbalizes understanding. Follow up in office in 4-6 weeks post cardioversion with ECG, sooner if needed.

## 2019-04-25 ENCOUNTER — TELEPHONE (OUTPATIENT)
Dept: CARDIOLOGY CLINIC | Age: 64
End: 2019-04-25

## 2019-04-28 ASSESSMENT — ENCOUNTER SYMPTOMS
DIARRHEA: 0
SHORTNESS OF BREATH: 0
BLOOD IN STOOL: 0
NAUSEA: 0
ABDOMINAL PAIN: 0
BACK PAIN: 0
EYE PAIN: 0
CONSTIPATION: 0
CHEST TIGHTNESS: 0
COLOR CHANGE: 0
PHOTOPHOBIA: 0
WHEEZING: 0
VOMITING: 0
COUGH: 0

## 2019-04-29 NOTE — TELEPHONE ENCOUNTER
Spoke with patient. Spoke with patient regarding scheduled procedure of Transesophageal Echocardiogram with Cardioversion with Sotalol loading scheduled with Dr Yas Ma on 5/6/2019 with an arrival time of 1000 and a scheduled start time of 1200. Patient confirmed date and time. Also discussed: Patient is to call pre registration at number provided to pre register 2 days prior to procedure. Patient voiced understanding. Patient advised where to check in upon arriving to [facility] morning of procedure. Patient instructed to not have anything by mouth after midnight night prior to or at least 8 hours before procedure. Patient may take medications morning of procedure with a sip of water. Patient again voiced understanding. Patient advised to prepare to stay overnight following procedure for observation. Patient also advised that once back to pre op holding IV will be initiated, medical history and medications reviewed, as well as complete any other testing that may be ordered. Patient voiced understanding. Also discussed medical history of:    Patient denies using a CPAP. Patient denies history of diabetes. Denies history of Implant or Device. Denies history of bypass or valve repair. Denies history of knee or hip replacement. Denies history of problems with anesthesia in the past.    Denies difficulty swallowing. Denies recent or active bleeding. Denies history of prostate problems or problems with milligan catheter in the past.    Denies open skin areas sores or rashes. Denies recent or current use of antibiotics. Denies recent illness, fever, or infection.

## 2019-05-03 ENCOUNTER — ANESTHESIA EVENT (OUTPATIENT)
Dept: CARDIAC CATH/INVASIVE PROCEDURES | Age: 64
DRG: 310 | End: 2019-05-03
Payer: MEDICARE

## 2019-05-03 NOTE — ANESTHESIA PRE PROCEDURE
Department of Anesthesiology  Preprocedure Note       Name:  Shahbaz Hall.   Age:  75 y.o.  :  1955                                          MRN:  [id number]         Date:  5/3/2019      Surgeon: * Surgery not found *    Procedure: CL CARDIOVERSION    Medications prior to admission:   Prior to Admission medications    Medication Sig Start Date End Date Taking?  Authorizing Provider   rivaroxaban (XARELTO) 20 MG TABS tablet Take 1 tablet by mouth daily (with breakfast) 19   Maisha Jernigan MD   atorvastatin (LIPITOR) 10 MG tablet TAKE 1 TABLET BY MOUTH ONE TIME A DAY  19   Crow Chu MD   lisinopril (PRINIVIL;ZESTRIL) 20 MG tablet Take 1 tablet by mouth daily 3/1/19   Crow Chu MD   amLODIPine (NORVASC) 5 MG tablet Take 1 tablet by mouth daily 3/1/19   Crow Chu MD   predniSONE (DELTASONE) 5 MG tablet Take 8 pills, then 7,6,5,4,3,2,1 19   Crow Chu MD   metoprolol tartrate (LOPRESSOR) 25 MG tablet TAKE 1 TABLET BY MOUTH TWICE DAILY 19   Crow Chu MD   allopurinol (ZYLOPRIM) 300 MG tablet Take 1 tablet by mouth daily 18   Crow Chu MD   melatonin (RA MELATONIN) 3 MG TABS tablet Take 1 tablet by mouth daily  Patient taking differently: Take 3 mg by mouth nightly as needed  18   Crow Chu MD   terazosin (HYTRIN) 1 MG capsule Take 1 capsule by mouth nightly 18   Crow Chu MD   indomethacin (INDOCIN) 50 MG capsule Take 1 capsule by mouth 3 times daily  Patient taking differently: Take 50 mg by mouth as needed  10/16/17   Crow Chu MD   aspirin 325 MG tablet Take 325 mg by mouth every morning  18   Crow Chu MD       Current medications:    Current Outpatient Medications   Medication Sig Dispense Refill    rivaroxaban (XARELTO) 20 MG TABS tablet Take 1 tablet by mouth daily (with breakfast) 28 tablet 0    atorvastatin (LIPITOR) 10 MG tablet TAKE 1 TABLET BY MOUTH ONE TIME GERD: well controlled, morbid obesity          Endo/Other:    (+) : arthritis:., .                 Abdominal:           Vascular:                                      Anesthesia Plan      MAC     ASA 3     ( NPO 2300)  Induction: intravenous. Anesthetic plan and risks discussed with patient.                     SONNY Mars - MALLORY   5/3/2019

## 2019-05-06 ENCOUNTER — HOSPITAL ENCOUNTER (INPATIENT)
Dept: CARDIAC CATH/INVASIVE PROCEDURES | Age: 64
LOS: 3 days | Discharge: HOME | DRG: 310 | End: 2019-05-09
Attending: INTERNAL MEDICINE | Admitting: INTERNAL MEDICINE
Payer: MEDICARE

## 2019-05-06 ENCOUNTER — ANESTHESIA (OUTPATIENT)
Dept: CARDIAC CATH/INVASIVE PROCEDURES | Age: 64
DRG: 310 | End: 2019-05-06
Payer: MEDICARE

## 2019-05-06 VITALS
DIASTOLIC BLOOD PRESSURE: 59 MMHG | OXYGEN SATURATION: 94 % | SYSTOLIC BLOOD PRESSURE: 86 MMHG | RESPIRATION RATE: 12 BRPM

## 2019-05-06 DIAGNOSIS — I48.19 PERSISTENT ATRIAL FIBRILLATION (HCC): ICD-10-CM

## 2019-05-06 DIAGNOSIS — I10 ESSENTIAL HYPERTENSION: ICD-10-CM

## 2019-05-06 PROBLEM — Z79.899 ENCOUNTER FOR MONITORING SOTALOL THERAPY: Status: ACTIVE | Noted: 2019-05-06

## 2019-05-06 PROBLEM — Z51.81 ENCOUNTER FOR MONITORING SOTALOL THERAPY: Status: ACTIVE | Noted: 2019-05-06

## 2019-05-06 LAB
ANION GAP SERPL CALCULATED.3IONS-SCNC: 9 MMOL/L (ref 4–16)
APTT: 47.8 SECONDS (ref 21.2–33)
BUN BLDV-MCNC: 15 MG/DL (ref 6–23)
CALCIUM SERPL-MCNC: 9.2 MG/DL (ref 8.3–10.6)
CHLORIDE BLD-SCNC: 101 MMOL/L (ref 99–110)
CO2: 27 MMOL/L (ref 21–32)
CREAT SERPL-MCNC: 1.1 MG/DL (ref 0.9–1.3)
GFR AFRICAN AMERICAN: >60 ML/MIN/1.73M2
GFR NON-AFRICAN AMERICAN: >60 ML/MIN/1.73M2
GLUCOSE BLD-MCNC: 113 MG/DL (ref 70–99)
HCT VFR BLD CALC: 41 % (ref 42–52)
HEMOGLOBIN: 13.4 GM/DL (ref 13.5–18)
INR BLD: 2.13 INDEX
MAGNESIUM: 2 MG/DL (ref 1.8–2.4)
MCH RBC QN AUTO: 31.8 PG (ref 27–31)
MCHC RBC AUTO-ENTMCNC: 32.7 % (ref 32–36)
MCV RBC AUTO: 97.4 FL (ref 78–100)
PDW BLD-RTO: 13.3 % (ref 11.7–14.9)
PHOSPHORUS: 3.6 MG/DL (ref 2.5–4.9)
PLATELET # BLD: 191 K/CU MM (ref 140–440)
PMV BLD AUTO: 9.5 FL (ref 7.5–11.1)
POTASSIUM SERPL-SCNC: 4.3 MMOL/L (ref 3.5–5.1)
PROTHROMBIN TIME: 24.1 SECONDS (ref 9.12–12.5)
RBC # BLD: 4.21 M/CU MM (ref 4.6–6.2)
SODIUM BLD-SCNC: 137 MMOL/L (ref 135–145)
WBC # BLD: 5.7 K/CU MM (ref 4–10.5)

## 2019-05-06 PROCEDURE — 3700000000 HC ANESTHESIA ATTENDED CARE

## 2019-05-06 PROCEDURE — 85027 COMPLETE CBC AUTOMATED: CPT

## 2019-05-06 PROCEDURE — 83735 ASSAY OF MAGNESIUM: CPT

## 2019-05-06 PROCEDURE — 92960 CARDIOVERSION ELECTRIC EXT: CPT

## 2019-05-06 PROCEDURE — 5A2204Z RESTORATION OF CARDIAC RHYTHM, SINGLE: ICD-10-PCS | Performed by: INTERNAL MEDICINE

## 2019-05-06 PROCEDURE — 6370000000 HC RX 637 (ALT 250 FOR IP): Performed by: INTERNAL MEDICINE

## 2019-05-06 PROCEDURE — 2500000003 HC RX 250 WO HCPCS

## 2019-05-06 PROCEDURE — 92960 CARDIOVERSION ELECTRIC EXT: CPT | Performed by: INTERNAL MEDICINE

## 2019-05-06 PROCEDURE — 3700000001 HC ADD 15 MINUTES (ANESTHESIA)

## 2019-05-06 PROCEDURE — 2140000000 HC CCU INTERMEDIATE R&B

## 2019-05-06 PROCEDURE — 93312 ECHO TRANSESOPHAGEAL: CPT | Performed by: INTERNAL MEDICINE

## 2019-05-06 PROCEDURE — 93312 ECHO TRANSESOPHAGEAL: CPT

## 2019-05-06 PROCEDURE — 85730 THROMBOPLASTIN TIME PARTIAL: CPT

## 2019-05-06 PROCEDURE — 80048 BASIC METABOLIC PNL TOTAL CA: CPT

## 2019-05-06 PROCEDURE — 85610 PROTHROMBIN TIME: CPT

## 2019-05-06 PROCEDURE — 2500000003 HC RX 250 WO HCPCS: Performed by: NURSE ANESTHETIST, CERTIFIED REGISTERED

## 2019-05-06 PROCEDURE — 93005 ELECTROCARDIOGRAM TRACING: CPT | Performed by: INTERNAL MEDICINE

## 2019-05-06 PROCEDURE — 6360000002 HC RX W HCPCS

## 2019-05-06 PROCEDURE — 84100 ASSAY OF PHOSPHORUS: CPT

## 2019-05-06 PROCEDURE — 2580000003 HC RX 258: Performed by: NURSE ANESTHETIST, CERTIFIED REGISTERED

## 2019-05-06 PROCEDURE — B24BZZ4 ULTRASONOGRAPHY OF HEART WITH AORTA, TRANSESOPHAGEAL: ICD-10-PCS | Performed by: INTERNAL MEDICINE

## 2019-05-06 PROCEDURE — 93325 DOPPLER ECHO COLOR FLOW MAPG: CPT | Performed by: INTERNAL MEDICINE

## 2019-05-06 PROCEDURE — 2709999900 HC NON-CHARGEABLE SUPPLY

## 2019-05-06 PROCEDURE — 6360000002 HC RX W HCPCS: Performed by: NURSE ANESTHETIST, CERTIFIED REGISTERED

## 2019-05-06 RX ORDER — SOTALOL HYDROCHLORIDE 80 MG/1
80 TABLET ORAL 2 TIMES DAILY
Status: DISCONTINUED | OUTPATIENT
Start: 2019-05-06 | End: 2019-05-09 | Stop reason: HOSPADM

## 2019-05-06 RX ORDER — DOXAZOSIN MESYLATE 1 MG/1
1 TABLET ORAL NIGHTLY
Status: DISCONTINUED | OUTPATIENT
Start: 2019-05-06 | End: 2019-05-09 | Stop reason: HOSPADM

## 2019-05-06 RX ORDER — LISINOPRIL 5 MG/1
20 TABLET ORAL DAILY
Status: DISCONTINUED | OUTPATIENT
Start: 2019-05-06 | End: 2019-05-09 | Stop reason: HOSPADM

## 2019-05-06 RX ORDER — PROPOFOL 10 MG/ML
INJECTION, EMULSION INTRAVENOUS PRN
Status: DISCONTINUED | OUTPATIENT
Start: 2019-05-06 | End: 2019-05-06 | Stop reason: SDUPTHER

## 2019-05-06 RX ORDER — MAGNESIUM SULFATE IN WATER 40 MG/ML
2 INJECTION, SOLUTION INTRAVENOUS PRN
Status: DISCONTINUED | OUTPATIENT
Start: 2019-05-06 | End: 2019-05-09 | Stop reason: HOSPADM

## 2019-05-06 RX ORDER — AMLODIPINE BESYLATE 5 MG/1
5 TABLET ORAL DAILY
Status: DISCONTINUED | OUTPATIENT
Start: 2019-05-06 | End: 2019-05-09 | Stop reason: HOSPADM

## 2019-05-06 RX ORDER — LANOLIN ALCOHOL/MO/W.PET/CERES
3 CREAM (GRAM) TOPICAL NIGHTLY PRN
Status: DISCONTINUED | OUTPATIENT
Start: 2019-05-06 | End: 2019-05-09 | Stop reason: HOSPADM

## 2019-05-06 RX ORDER — SODIUM CHLORIDE 9 MG/ML
INJECTION, SOLUTION INTRAVENOUS CONTINUOUS PRN
Status: DISCONTINUED | OUTPATIENT
Start: 2019-05-06 | End: 2019-05-06 | Stop reason: SDUPTHER

## 2019-05-06 RX ORDER — ATORVASTATIN CALCIUM 10 MG/1
10 TABLET, FILM COATED ORAL NIGHTLY
Status: DISCONTINUED | OUTPATIENT
Start: 2019-05-06 | End: 2019-05-09 | Stop reason: HOSPADM

## 2019-05-06 RX ORDER — ALLOPURINOL 300 MG/1
300 TABLET ORAL DAILY
Status: DISCONTINUED | OUTPATIENT
Start: 2019-05-06 | End: 2019-05-09 | Stop reason: HOSPADM

## 2019-05-06 RX ORDER — ASPIRIN 325 MG
325 TABLET ORAL EVERY MORNING
Status: DISCONTINUED | OUTPATIENT
Start: 2019-05-06 | End: 2019-05-06

## 2019-05-06 RX ORDER — LIDOCAINE HYDROCHLORIDE 20 MG/ML
INJECTION, SOLUTION EPIDURAL; INFILTRATION; INTRACAUDAL; PERINEURAL PRN
Status: DISCONTINUED | OUTPATIENT
Start: 2019-05-06 | End: 2019-05-06 | Stop reason: SDUPTHER

## 2019-05-06 RX ADMIN — PROPOFOL 30 MG: 10 INJECTION, EMULSION INTRAVENOUS at 11:57

## 2019-05-06 RX ADMIN — SODIUM CHLORIDE: 9 INJECTION, SOLUTION INTRAVENOUS at 11:50

## 2019-05-06 RX ADMIN — SOTALOL HYDROCHLORIDE 80 MG: 80 TABLET ORAL at 23:15

## 2019-05-06 RX ADMIN — ATORVASTATIN CALCIUM 10 MG: 10 TABLET, FILM COATED ORAL at 23:15

## 2019-05-06 RX ADMIN — DOXAZOSIN 1 MG: 1 TABLET ORAL at 23:15

## 2019-05-06 RX ADMIN — PROPOFOL 50 MG: 10 INJECTION, EMULSION INTRAVENOUS at 11:58

## 2019-05-06 RX ADMIN — LIDOCAINE HYDROCHLORIDE 100 MG: 20 INJECTION, SOLUTION EPIDURAL; INFILTRATION; INTRACAUDAL; PERINEURAL at 11:56

## 2019-05-06 RX ADMIN — PROPOFOL 80 MG: 10 INJECTION, EMULSION INTRAVENOUS at 11:56

## 2019-05-06 RX ADMIN — PROPOFOL 60 MG: 10 INJECTION, EMULSION INTRAVENOUS at 11:59

## 2019-05-06 RX ADMIN — PROPOFOL 30 MG: 10 INJECTION, EMULSION INTRAVENOUS at 12:02

## 2019-05-06 RX ADMIN — SOTALOL HYDROCHLORIDE 80 MG: 80 TABLET ORAL at 12:55

## 2019-05-06 ASSESSMENT — PULMONARY FUNCTION TESTS
PIF_VALUE: 0
PIF_VALUE: 1
PIF_VALUE: 0
PIF_VALUE: 1
PIF_VALUE: 1
PIF_VALUE: 0
PIF_VALUE: 0
PIF_VALUE: 1
PIF_VALUE: 0
PIF_VALUE: 1
PIF_VALUE: 0
PIF_VALUE: 1
PIF_VALUE: 0
PIF_VALUE: 1
PIF_VALUE: 0

## 2019-05-06 ASSESSMENT — PAIN SCALES - GENERAL: PAINLEVEL_OUTOF10: 0

## 2019-05-06 NOTE — PROCEDURES
[facility]     Electrophysiology Procedure Note       Date of Procedure: 5/6/2019  Patient's Name: Shefali Sprague. YOB: 1955   Medical Record Number: [id number]    Procedure Performed by: Mitzi Avelar MD    Sedation: Anesthesia    Procedures performed:    Trans-esophageal echocardiography  External Electrical cardioversion    Indication of the procedure:  Atrial fibrillation      Shefali Tom is a 75 y.o. male with symptomatic atrial fibrillation       Details of procedure: The patient was brought to the cath lab area in a fasting and non-sedated state. The risks, benefits and alternatives of the procedure were discussed with the patient. The patient opted to proceed with the procedure. Written informed consent was signed and placed in the chart. A timeout protocol was completed to identify the patient and the procedure being performed. Sedation per anesthesia and KARIS was performed which did not show any MADI/LA clot/thrombus. Full KARIS reports will be dictated. After confirming sedation and electrical DC cardioversion was perfomred using 200J, synchronized shock. Patient was converted to sinus rhythm. The patient tolerated the procedure well and there were no complications. Conclusion:   Successful external DC cardioversion of atrial fibrillation.      Plan:   Admit for sotalol therapy

## 2019-05-06 NOTE — FLOWSHEET NOTE
Report phoned to [facility] 3N for pending pt admit to [phone]. Pt sitting up in bed eating lunch. VSS. Respirations even and unlabored.  Remains NSR on bedside monitor

## 2019-05-06 NOTE — PROCEDURES
Elida Kyle.   75 y.o., male  1955 5/6/2019    Attending: Ching Quezada MD    Sedation : Anesthesia                        Indication:     Atrial fibrillation                                                                                                                                                      After obtaining the informed cosent. Pt brought to EP lab. Sedation per anesthesia . After proper sedation KARIS performed. US Doppler was used to assess abnormalities where appropriate. Post procedure pt is stable.       Findings:  LV=  Normal LVEF  LA=  MILD DILATED  MADI= NO CLOTS  RV= normal  RA=  normal  IAS= Normal  Bubble study=not donefor PFO or ASD  AV= TRICUSPID, No regurgitation or stenosis  MV= trivial regurgitation, no stenosis  TV=mild regurgitation  PV= no significant regurgitation,   Aorta= no significant plaque noted  PUL BELLE FLOW=Systolic blunting, in atrial fibrillation    Impression:  No MADI clot    Plan:  Proceed with cardioversion

## 2019-05-06 NOTE — H&P
Electrophysiology H&P Note      Reason for consultation: atrial fibrillation    Chief complaint : atrial fibrillation    Referring physician:      Primary care physician: Floyd Donovan MD      History of Present Illness: Today visit (05/06/19)    Patient here for KARIS/Cardioversion. No change in H&P noted from previous clinic visit. Previous visit:     Chief Complaint   Patient presents with   [facility] Lung     Dr James Conner patient here for consult for Afib. Patient denies palpitations, chest pain. He reports he is usual self. Reports tiredness and weakness and he attributes to his age at times. Patient has no shortness of breath at rest but occasional with exertion. Patient denies dizziness or syncope Patient states he was unaware he had afib when his primary care diagnosed it one year ago. Patient reports alcohol occassionally as well as 1 cup of coffee daily.          Pastmedical history:   Past Medical History:   Diagnosis Date    Arthritis     feet, elbows, fingers    BPH (benign prostatic hypertrophy)     Depression     Erectile dysfunction     Family history of diabetes mellitus     mother brothers/sisters    Gout, unspecified     last flare  April-May 2018    History of exercise stress test 05/04/2017    treadmill    Hx of Doppler echocardiogram 08/29/2018    [id number]%,mildly dilated left atrium    Hyperlipidemia     Hypertension     Impaired fasting glucose     prediabetic    New onset atrial fibrillation ([address] 04/13/2017    CHRONICALLY IRREG--- 4/12/17 ECG by PCP on routine visit- seeing Dr James Conner, on toprol and ASA, no anticoagulation w low CHADS    Other testicular hypofunction     Peripheral neuropathy     ?possibly fr etoh- mild intake ~4-6 [place] iced teas/week    Persistent atrial fibrillation ([address] 11/9/2017    Sleep disturbance, unspecified     Tubular adenoma of colon 06/26/2018    Dr Brock Palmer, recheck one yr Surgical history :   Past Surgical History:   Procedure Laterality Date    COLECTOMY  08/07/2018    COLONOSCOPY  06/26/2018    Grade 2 Internal hemorrhoids, 4cm polyp - surgical consult needed    DENTAL SURGERY  11/2016    full dental restoration    EYE SURGERY Bilateral 2013    cataracts    HEMICOLECTOMY  08/10/2018    robotic    HERNIA REPAIR  early [address]    Umbilical     VASECTOMY  1982       Family history:   Family History   Problem Relation Age of Onset    Heart Disease Mother     Diabetes Mother     Heart Disease Father     Diabetes Sister     Diabetes Brother     Heart Disease Brother     Heart Surgery Brother         CABG    Diabetes Brother     Diabetes Brother        Social history :  reports that he has never smoked. He has never used smokeless tobacco. He reports that he drinks alcohol. He reports that he does not use drugs. No Known Allergies    No current facility-administered medications on file prior to encounter.       Current Outpatient Medications on File Prior to Encounter   Medication Sig Dispense Refill    rivaroxaban (XARELTO) 20 MG TABS tablet Take 1 tablet by mouth daily (with breakfast) 28 tablet 0    atorvastatin (LIPITOR) 10 MG tablet TAKE 1 TABLET BY MOUTH ONE TIME A DAY  90 tablet 0    lisinopril (PRINIVIL;ZESTRIL) 20 MG tablet Take 1 tablet by mouth daily 90 tablet 1    amLODIPine (NORVASC) 5 MG tablet Take 1 tablet by mouth daily 90 tablet 1    metoprolol tartrate (LOPRESSOR) 25 MG tablet TAKE 1 TABLET BY MOUTH TWICE DAILY 180 tablet 0    allopurinol (ZYLOPRIM) 300 MG tablet Take 1 tablet by mouth daily 90 tablet 1    terazosin (HYTRIN) 1 MG capsule Take 1 capsule by mouth nightly 90 capsule 1    aspirin 325 MG tablet Take 325 mg by mouth every morning  30 tablet     predniSONE (DELTASONE) 5 MG tablet Take 8 pills, then 7,6,5,4,3,2,1 36 tablet 0    melatonin (RA MELATONIN) 3 MG TABS tablet Take 1 tablet by mouth daily (Patient taking differently: Take 3 mg by mouth nightly as needed ) 90 tablet 0    indomethacin (INDOCIN) 50 MG capsule Take 1 capsule by mouth 3 times daily (Patient taking differently: Take 50 mg by mouth as needed ) 180 capsule 1       Review of Systems:   Review of Systems   Constitutional: Positive for fatigue. Negative for activity change, chills and fever. HENT: Negative for congestion, ear pain and tinnitus. Eyes: Negative for photophobia, pain and visual disturbance. Respiratory: Negative for cough, chest tightness, shortness of breath and wheezing. Cardiovascular: Negative for chest pain, palpitations and leg swelling. Gastrointestinal: Negative for abdominal pain, blood in stool, constipation, diarrhea, nausea and vomiting. Endocrine: Negative for cold intolerance and heat intolerance. Genitourinary: Negative for dysuria, flank pain and hematuria. Musculoskeletal: Negative for arthralgias, back pain, myalgias and neck stiffness. Skin: Negative for color change and rash. Allergic/Immunologic: Negative for food allergies. Neurological: Negative for dizziness, light-headedness, numbness and headaches. Hematological: Does not bruise/bleed easily. Psychiatric/Behavioral: Negative for agitation, behavioral problems and confusion. Physical Examination:    /73   Pulse 72   Temp 98.5 °F (36.9 °C) (Temporal)   Resp 18   Ht 6' 4\" (1.93 m)   Wt 250 lb (113.4 kg)   BMI 30.43 kg/m²    Wt Readings from Last 3 Encounters:   05/06/19 250 lb (113.4 kg)   04/24/19 259 lb (117.5 kg)   04/17/19 259 lb 9.6 oz (117.8 kg)     Body mass index is 30.43 kg/m². Physical Exam   Constitutional: He is oriented to person, place, and time. He appears well-developed and well-nourished. No distress. HENT:   Head: Normocephalic and atraumatic. Eyes: Pupils are equal, round, and reactive to light. EOM are normal.   Neck: Normal range of motion. No JVD present. Cardiovascular: Exam reveals no friction rub. No murmur heard. Irregular rhythm and rate   Pulmonary/Chest: Effort normal and breath sounds normal. No respiratory distress. He has no wheezes. He has no rales. Abdominal: Soft. Bowel sounds are normal. He exhibits no distension. There is no tenderness. Musculoskeletal: He exhibits no edema or tenderness. Neurological: He is alert and oriented to person, place, and time. No cranial nerve deficit. Skin: Skin is warm and dry. Psychiatric: He has a normal mood and affect. CBC:   Lab Results   Component Value Date    WBC 10.9 08/11/2018    HGB 12.4 08/11/2018    HCT 38.0 08/11/2018     08/11/2018     Lipids:  Lab Results   Component Value Date    CHOL 190 05/22/2018    TRIG 171 (H) 05/22/2018    HDL 41 05/22/2018    LDLCALC 115 (H) 05/22/2018     PT/INR: No results found for: INR     BMP:    Lab Results   Component Value Date     (L) 08/11/2018    K 4.4 08/11/2018    CL 98 (L) 08/11/2018    CO2 25 08/11/2018    BUN 21 08/11/2018     CMP:   Lab Results   Component Value Date    AST 19 05/22/2018    PROT 7.4 05/22/2018    BILITOT 0.5 05/22/2018    ALKPHOS 88 05/22/2018     TSH:  No results found for: TSH    EKGINTERPRETATION - EKG Interpretation:  Atrial fibrillation      IMPRESSION / RECOMMENDATIONS:     1. Persistent atrial fibrillation  2. HTN  3. HLD  4. BPH    Patient over all not very symptomatic but reports over all effieiciency is decreasing in last one year  Patient has CHADS-VAS of 2 (age and HTN)   Discussed risk with stroke in atrial fibrillation vs bleeding on anticoagulation  Aspirin vs anticoagulation    Wants to consider anticoagulation On xarelto   Recommend KARIS/Cardioversion to see if we can keep him in rhythm  Will consider antiarrhythmic post cardioversion - possibly tikosyn. Thanks again for allowing me to participate in care of this patient. Please call me if you have any questions. With best regards.       June Holm MD, 5/6/2019 10:14 AM

## 2019-05-07 LAB
ALBUMIN SERPL-MCNC: 4 GM/DL (ref 3.4–5)
ANION GAP SERPL CALCULATED.3IONS-SCNC: 9 MMOL/L (ref 4–16)
BUN BLDV-MCNC: 15 MG/DL (ref 6–23)
CALCIUM SERPL-MCNC: 8.9 MG/DL (ref 8.3–10.6)
CHLORIDE BLD-SCNC: 102 MMOL/L (ref 99–110)
CO2: 28 MMOL/L (ref 21–32)
CREAT SERPL-MCNC: 1.1 MG/DL (ref 0.9–1.3)
EKG ATRIAL RATE: 250 BPM
EKG ATRIAL RATE: 76 BPM
EKG DIAGNOSIS: NORMAL
EKG DIAGNOSIS: NORMAL
EKG P AXIS: 49 DEGREES
EKG P-R INTERVAL: 202 MS
EKG Q-T INTERVAL: 376 MS
EKG Q-T INTERVAL: 392 MS
EKG QRS DURATION: 88 MS
EKG QRS DURATION: 94 MS
EKG QTC CALCULATION (BAZETT): 419 MS
EKG QTC CALCULATION (BAZETT): 441 MS
EKG R AXIS: 61 DEGREES
EKG R AXIS: 78 DEGREES
EKG T AXIS: 58 DEGREES
EKG T AXIS: 74 DEGREES
EKG VENTRICULAR RATE: 75 BPM
EKG VENTRICULAR RATE: 76 BPM
GFR AFRICAN AMERICAN: >60 ML/MIN/1.73M2
GFR NON-AFRICAN AMERICAN: >60 ML/MIN/1.73M2
GLUCOSE BLD-MCNC: 110 MG/DL (ref 70–99)
HCT VFR BLD CALC: 40.9 % (ref 42–52)
HEMOGLOBIN: 12.9 GM/DL (ref 13.5–18)
MAGNESIUM: 2.2 MG/DL (ref 1.8–2.4)
MCH RBC QN AUTO: 31.5 PG (ref 27–31)
MCHC RBC AUTO-ENTMCNC: 31.5 % (ref 32–36)
MCV RBC AUTO: 99.8 FL (ref 78–100)
PDW BLD-RTO: 13.2 % (ref 11.7–14.9)
PHOSPHORUS: 3.8 MG/DL (ref 2.5–4.9)
PLATELET # BLD: 183 K/CU MM (ref 140–440)
PMV BLD AUTO: 9.8 FL (ref 7.5–11.1)
POTASSIUM SERPL-SCNC: 4.4 MMOL/L (ref 3.5–5.1)
RBC # BLD: 4.1 M/CU MM (ref 4.6–6.2)
SODIUM BLD-SCNC: 139 MMOL/L (ref 135–145)
WBC # BLD: 6.4 K/CU MM (ref 4–10.5)

## 2019-05-07 PROCEDURE — 93005 ELECTROCARDIOGRAM TRACING: CPT | Performed by: INTERNAL MEDICINE

## 2019-05-07 PROCEDURE — 85027 COMPLETE CBC AUTOMATED: CPT

## 2019-05-07 PROCEDURE — 6370000000 HC RX 637 (ALT 250 FOR IP): Performed by: INTERNAL MEDICINE

## 2019-05-07 PROCEDURE — 83735 ASSAY OF MAGNESIUM: CPT

## 2019-05-07 PROCEDURE — 80069 RENAL FUNCTION PANEL: CPT

## 2019-05-07 PROCEDURE — 36415 COLL VENOUS BLD VENIPUNCTURE: CPT

## 2019-05-07 PROCEDURE — 99232 SBSQ HOSP IP/OBS MODERATE 35: CPT | Performed by: NURSE PRACTITIONER

## 2019-05-07 PROCEDURE — 2140000000 HC CCU INTERMEDIATE R&B

## 2019-05-07 PROCEDURE — 84100 ASSAY OF PHOSPHORUS: CPT

## 2019-05-07 PROCEDURE — 93010 ELECTROCARDIOGRAM REPORT: CPT | Performed by: INTERNAL MEDICINE

## 2019-05-07 RX ADMIN — AMLODIPINE BESYLATE 5 MG: 5 TABLET ORAL at 09:10

## 2019-05-07 RX ADMIN — SOTALOL HYDROCHLORIDE 80 MG: 80 TABLET ORAL at 21:27

## 2019-05-07 RX ADMIN — LISINOPRIL 20 MG: 5 TABLET ORAL at 09:10

## 2019-05-07 RX ADMIN — ATORVASTATIN CALCIUM 10 MG: 10 TABLET, FILM COATED ORAL at 21:27

## 2019-05-07 RX ADMIN — SOTALOL HYDROCHLORIDE 80 MG: 80 TABLET ORAL at 09:10

## 2019-05-07 RX ADMIN — RIVAROXABAN 20 MG: 20 TABLET, FILM COATED ORAL at 09:10

## 2019-05-07 RX ADMIN — DOXAZOSIN 1 MG: 1 TABLET ORAL at 21:28

## 2019-05-07 RX ADMIN — ALLOPURINOL 300 MG: 300 TABLET ORAL at 09:10

## 2019-05-07 NOTE — PLAN OF CARE
Problem: Infection:  Goal: Will remain free from infection  Description  Will remain free from infection  5/7/2019 1051 by Amelia Scott RN  Outcome: Ongoing  5/7/2019 0325 by Rosa Virgen RN  Outcome: Ongoing     Problem: Safety:  Goal: Free from accidental physical injury  Description  Free from accidental physical injury  5/7/2019 1051 by Amelia Scott RN  Outcome: Ongoing  5/7/2019 0325 by Rosa Virgen RN  Outcome: Ongoing  Goal: Free from intentional harm  Description  Free from intentional harm  5/7/2019 1051 by Amelia Scott RN  Outcome: Ongoing  5/7/2019 0325 by Rosa Virgen RN  Outcome: Ongoing  Goal: Ability to remain free from injury will improve  Description  Ability to remain free from injury will improve  Outcome: Ongoing  Goal: Will show no signs and symptoms of excessive bleeding  Description  Will show no signs and symptoms of excessive bleeding  Outcome: Ongoing     Problem: Daily Care:  Goal: Daily care needs are met  Description  Daily care needs are met  5/7/2019 1051 by Amelia Scott RN  Outcome: Ongoing  5/7/2019 0325 by Rosa Virgen RN  Outcome: Ongoing     Problem: Pain:  Goal: Patient's pain/discomfort is manageable  Description  Patient's pain/discomfort is manageable  5/7/2019 1051 by Amelia Scott RN  Outcome: Ongoing  5/7/2019 0325 by Rosa Virgen RN  Outcome: Ongoing     Problem: Discharge Planning:  Goal: Patients continuum of care needs are met  Description  Patients continuum of care needs are met  5/7/2019 1051 by Amelia Scott RN  Outcome: Ongoing  5/7/2019 0325 by Rosa Virgen RN  Outcome: Ongoing     Problem:  Activity:  Goal: Ability to tolerate increased activity will improve  Description  Ability to tolerate increased activity will improve  Outcome: Ongoing  Goal: Expression of feelings of increased energy will increase  Description  Expression of feelings of increased energy will increase  Outcome: Ongoing     Problem: Cardiac:  Goal:

## 2019-05-07 NOTE — CARE COORDINATION
SERGEW spoke with pt regarding  discharge plans. Pt lives in a 1 story home with his wife. Pt stated his home has a finished basement. Pt denies any DME or HC in the home. Pt has a PCP and can afford his meds. Pt stated his plans are to return home at discharge and denies any needs.

## 2019-05-07 NOTE — PROGRESS NOTES
Admit Date:  5/6/2019    Admission diagnosis / Complaint : atrial fibrillation      Subjective:  Mr. Mk Wilcox reports he is feeling well. Denies palpitations or shortness of breath. Discussed with patient that he will be in the hospital for 72 hours to monitor QTc. Objective:   /77   Pulse 68   Temp 98.3 °F (36.8 °C) (Oral)   Resp 20   Ht 6' 4\" (1.93 m)   Wt 250 lb (113.4 kg)   SpO2 95%   BMI 30.43 kg/m²   No intake or output data in the 24 hours ending 05/07/19 1605    TELEMETRY: Sinus    has a past medical history of Arthritis, BPH (benign prostatic hypertrophy), Depression, Erectile dysfunction, Family history of diabetes mellitus, Gout, unspecified, History of exercise stress test, Hx of Doppler echocardiogram, Hyperlipidemia, Hypertension, Impaired fasting glucose, New onset atrial fibrillation ([address] Other testicular hypofunction, Peripheral neuropathy, Persistent atrial fibrillation ([address] Sleep disturbance, unspecified, and Tubular adenoma of colon. has a past surgical history that includes Vasectomy (1982); Dental surgery (11/2016); Colonoscopy (06/26/2018); hernia repair (early 2000's); eye surgery (Bilateral, 2013); hemicolectomy (08/10/2018); and colectomy (08/07/2018).   Physical Exam:  General:  Awake, alert, NAD  Skin:  Warm and dry  Neck:  JVD not present  Chest:  Clear to auscultation, respiration easy  Cardiovascular:  RRR S1S2  Abdomen:  Soft nontender  Extremities:  No edema    Medications:    allopurinol  300 mg Oral Daily    lisinopril  20 mg Oral Daily    amLODIPine  5 mg Oral Daily    atorvastatin  10 mg Oral Nightly    rivaroxaban  20 mg Oral Daily with breakfast    doxazosin  1 mg Oral Nightly    sotalol  80 mg Oral BID       melatonin, magnesium sulfate    Lab Data:  CBC:   Recent Labs     05/06/19  1010 05/07/19  0418   WBC 5.7 6.4   HGB 13.4* 12.9*   HCT 41.0* 40.9*   MCV 97.4 99.8    183     BMP:   Recent Labs     05/06/19  1010 05/07/19  0418    139 K 4.3 4.4    102   CO2 27 28   PHOS 3.6 3.8   BUN 15 15   CREATININE 1.1 1.1     LIVER PROFILE: No results for input(s): AST, ALT, LIPASE, BILIDIR, BILITOT, ALKPHOS in the last 72 hours. Invalid input(s): AMYLASE,  ALB  PT/INR:   Recent Labs     05/06/19  1010   PROTIME 24.1*   INR 2.13     APTT:   Recent Labs     05/06/19  1010   APTT 47.8*     BNP:  No results for input(s): BNP in the last 72 hours. TROPONIN: @TROPONINI:3@    Assessment and Plan:    Sotalol Monitoring Therapy  S/P Cardioversion  Persistent Atrial Fibrillation  HTN  HLD  BPH    Patient reports he is feeling well  QTc within range  Continue sotalol 80 mg BID  Continue xarelto    Patient will have an EKG 1 hour prior to each scheduled dose of Sotalol is to be given with the QTc called to the physician. The Sotalol will be dosed according to the QTc  Patient will require 72 hour admission  Discussed with patient, voiced understanding. DREW Nayak 5/7/2019 4:05 PM     I have seen and examined this patient personally, independently of the nurse practitioner. I have reviewed the hospital care given to date and reviewed all pertinent labs and imaging. The plan was developed mutually at the time of the visit with the patient, Nurse practioner (NP)  and myself. I have spoken with patient, nursing staff and provided written and verbal instructions. The above note has radha reviewed and I agree with the history, physical examination and treatment plan. Necessary changes to the note has been made in history, physical examination and plan to the above note.     Yousuf Thompson M.D 05/07/19

## 2019-05-08 LAB
ALBUMIN SERPL-MCNC: 3.8 GM/DL (ref 3.4–5)
ANION GAP SERPL CALCULATED.3IONS-SCNC: 11 MMOL/L (ref 4–16)
BUN BLDV-MCNC: 15 MG/DL (ref 6–23)
CALCIUM SERPL-MCNC: 9.1 MG/DL (ref 8.3–10.6)
CHLORIDE BLD-SCNC: 100 MMOL/L (ref 99–110)
CO2: 26 MMOL/L (ref 21–32)
CREAT SERPL-MCNC: 1.1 MG/DL (ref 0.9–1.3)
EKG ATRIAL RATE: 66 BPM
EKG ATRIAL RATE: 75 BPM
EKG DIAGNOSIS: NORMAL
EKG DIAGNOSIS: NORMAL
EKG P AXIS: 50 DEGREES
EKG P AXIS: 63 DEGREES
EKG P-R INTERVAL: 226 MS
EKG P-R INTERVAL: 226 MS
EKG Q-T INTERVAL: 380 MS
EKG Q-T INTERVAL: 406 MS
EKG QRS DURATION: 92 MS
EKG QRS DURATION: 94 MS
EKG QTC CALCULATION (BAZETT): 424 MS
EKG QTC CALCULATION (BAZETT): 425 MS
EKG R AXIS: 64 DEGREES
EKG R AXIS: 79 DEGREES
EKG T AXIS: 50 DEGREES
EKG T AXIS: 53 DEGREES
EKG VENTRICULAR RATE: 66 BPM
EKG VENTRICULAR RATE: 75 BPM
GFR AFRICAN AMERICAN: >60 ML/MIN/1.73M2
GFR NON-AFRICAN AMERICAN: >60 ML/MIN/1.73M2
GLUCOSE BLD-MCNC: 107 MG/DL (ref 70–99)
HCT VFR BLD CALC: 42.7 % (ref 42–52)
HEMOGLOBIN: 13.6 GM/DL (ref 13.5–18)
MAGNESIUM: 1.9 MG/DL (ref 1.8–2.4)
MCH RBC QN AUTO: 31.3 PG (ref 27–31)
MCHC RBC AUTO-ENTMCNC: 31.9 % (ref 32–36)
MCV RBC AUTO: 98.2 FL (ref 78–100)
PDW BLD-RTO: 13.2 % (ref 11.7–14.9)
PHOSPHORUS: 4.4 MG/DL (ref 2.5–4.9)
PLATELET # BLD: 189 K/CU MM (ref 140–440)
PMV BLD AUTO: 9.6 FL (ref 7.5–11.1)
POTASSIUM SERPL-SCNC: 4.3 MMOL/L (ref 3.5–5.1)
RBC # BLD: 4.35 M/CU MM (ref 4.6–6.2)
SODIUM BLD-SCNC: 137 MMOL/L (ref 135–145)
WBC # BLD: 7 K/CU MM (ref 4–10.5)

## 2019-05-08 PROCEDURE — 36415 COLL VENOUS BLD VENIPUNCTURE: CPT

## 2019-05-08 PROCEDURE — 93010 ELECTROCARDIOGRAM REPORT: CPT | Performed by: INTERNAL MEDICINE

## 2019-05-08 PROCEDURE — 80069 RENAL FUNCTION PANEL: CPT

## 2019-05-08 PROCEDURE — 83735 ASSAY OF MAGNESIUM: CPT

## 2019-05-08 PROCEDURE — 6370000000 HC RX 637 (ALT 250 FOR IP): Performed by: NURSE PRACTITIONER

## 2019-05-08 PROCEDURE — 85027 COMPLETE CBC AUTOMATED: CPT

## 2019-05-08 PROCEDURE — 93005 ELECTROCARDIOGRAM TRACING: CPT | Performed by: INTERNAL MEDICINE

## 2019-05-08 PROCEDURE — 6370000000 HC RX 637 (ALT 250 FOR IP): Performed by: INTERNAL MEDICINE

## 2019-05-08 PROCEDURE — 99231 SBSQ HOSP IP/OBS SF/LOW 25: CPT | Performed by: INTERNAL MEDICINE

## 2019-05-08 PROCEDURE — 2140000000 HC CCU INTERMEDIATE R&B

## 2019-05-08 RX ORDER — ACETAMINOPHEN 325 MG/1
650 TABLET ORAL EVERY 4 HOURS PRN
Status: DISCONTINUED | OUTPATIENT
Start: 2019-05-08 | End: 2019-05-09 | Stop reason: HOSPADM

## 2019-05-08 RX ADMIN — LISINOPRIL 20 MG: 5 TABLET ORAL at 10:19

## 2019-05-08 RX ADMIN — SOTALOL HYDROCHLORIDE 80 MG: 80 TABLET ORAL at 10:15

## 2019-05-08 RX ADMIN — RIVAROXABAN 20 MG: 20 TABLET, FILM COATED ORAL at 10:16

## 2019-05-08 RX ADMIN — ACETAMINOPHEN 650 MG: 325 TABLET ORAL at 13:43

## 2019-05-08 RX ADMIN — ALLOPURINOL 300 MG: 300 TABLET ORAL at 10:15

## 2019-05-08 RX ADMIN — DOXAZOSIN 1 MG: 1 TABLET ORAL at 21:21

## 2019-05-08 RX ADMIN — ATORVASTATIN CALCIUM 10 MG: 10 TABLET, FILM COATED ORAL at 21:21

## 2019-05-08 RX ADMIN — AMLODIPINE BESYLATE 5 MG: 5 TABLET ORAL at 12:54

## 2019-05-08 RX ADMIN — SOTALOL HYDROCHLORIDE 80 MG: 80 TABLET ORAL at 21:21

## 2019-05-08 ASSESSMENT — PAIN DESCRIPTION - LOCATION: LOCATION: KNEE

## 2019-05-08 ASSESSMENT — PAIN DESCRIPTION - ORIENTATION: ORIENTATION: RIGHT

## 2019-05-08 ASSESSMENT — PAIN SCALES - GENERAL
PAINLEVEL_OUTOF10: 3
PAINLEVEL_OUTOF10: 3

## 2019-05-08 NOTE — PROGRESS NOTES
Admit Date:  5/6/2019    Admission diagnosis / Complaint : atrial fibrillation      Subjective:    Mr. Lj Mathews reports he is feeling well. Denies palpitations or shortness of breath. Patient over all feels good. No complaints    Objective:   /69   Pulse 67   Temp 97.9 °F (36.6 °C)   Resp 18   Ht 6' 4\" (1.93 m)   Wt 248 lb 12.8 oz (112.9 kg)   SpO2 95%   BMI 30.28 kg/m²       Intake/Output Summary (Last 24 hours) at 5/8/2019 1508  Last data filed at 5/8/2019 1254  Gross per 24 hour   Intake 240 ml   Output --   Net 240 ml       TELEMETRY: Sinus. Very short mobitz type I block noted on the monitor when patient was in sleep      has a past medical history of Arthritis, BPH (benign prostatic hypertrophy), Depression, Erectile dysfunction, Family history of diabetes mellitus, Gout, unspecified, History of exercise stress test, Hx of Doppler echocardiogram, Hyperlipidemia, Hypertension, Impaired fasting glucose, New onset atrial fibrillation ([address] Other testicular hypofunction, Peripheral neuropathy, Persistent atrial fibrillation ([address] Sleep disturbance, unspecified, and Tubular adenoma of colon. has a past surgical history that includes Vasectomy (1982); Dental surgery (11/2016); Colonoscopy (06/26/2018); hernia repair (early 2000's); eye surgery (Bilateral, 2013); hemicolectomy (08/10/2018); and colectomy (08/07/2018).   Physical Exam:  General:  Awake, alert, NAD  Skin:  Warm and dry  Neck:  JVD not present  Chest:  Clear to auscultation, respiration easy  Cardiovascular:  RRR S1S2  Abdomen:  Soft nontender  Extremities:  No edema    Medications:    allopurinol  300 mg Oral Daily    lisinopril  20 mg Oral Daily    amLODIPine  5 mg Oral Daily    atorvastatin  10 mg Oral Nightly    rivaroxaban  20 mg Oral Daily with breakfast    doxazosin  1 mg Oral Nightly    sotalol  80 mg Oral BID       acetaminophen, melatonin, magnesium sulfate    Lab Data:  CBC:   Recent Labs     05/06/19  1010 05/07/19  2228 05/08/19  0430   WBC 5.7 6.4 7.0   HGB 13.4* 12.9* 13.6   HCT 41.0* 40.9* 42.7   MCV 97.4 99.8 98.2    183 189     BMP:   Recent Labs     05/06/19  1010 05/07/19  0418 05/08/19  0430    139 137   K 4.3 4.4 4.3    102 100   CO2 27 28 26   PHOS 3.6 3.8 4.4   BUN 15 15 15   CREATININE 1.1 1.1 1.1     LIVER PROFILE: No results for input(s): AST, ALT, LIPASE, BILIDIR, BILITOT, ALKPHOS in the last 72 hours. Invalid input(s): AMYLASE,  ALB  PT/INR:   Recent Labs     05/06/19  1010   PROTIME 24.1*   INR 2.13     APTT:   Recent Labs     05/06/19  1010   APTT 47.8*     BNP:  No results for input(s): BNP in the last 72 hours.   TROPONIN: @TROPONINI:3@    Assessment and Plan:    Sotalol Monitoring Therapy  S/P Cardioversion  Persistent Atrial Fibrillation  HTN  HLD  BPH      Patient reports he is feeling well  QTc within range  Continue sotalol 80 mg BID dose  Continue xarelto    Patient is possible discharge tomorrow    Brian Matthew

## 2019-05-09 ENCOUNTER — TELEPHONE (OUTPATIENT)
Dept: INTERNAL MEDICINE CLINIC | Age: 64
End: 2019-05-09

## 2019-05-09 VITALS
RESPIRATION RATE: 19 BRPM | HEIGHT: 76 IN | SYSTOLIC BLOOD PRESSURE: 143 MMHG | OXYGEN SATURATION: 95 % | WEIGHT: 247.2 LBS | BODY MASS INDEX: 30.1 KG/M2 | DIASTOLIC BLOOD PRESSURE: 85 MMHG | HEART RATE: 63 BPM | TEMPERATURE: 98.3 F

## 2019-05-09 LAB
ALBUMIN SERPL-MCNC: 3.9 GM/DL (ref 3.4–5)
ANION GAP SERPL CALCULATED.3IONS-SCNC: 11 MMOL/L (ref 4–16)
BUN BLDV-MCNC: 20 MG/DL (ref 6–23)
CALCIUM SERPL-MCNC: 9.2 MG/DL (ref 8.3–10.6)
CHLORIDE BLD-SCNC: 102 MMOL/L (ref 99–110)
CO2: 26 MMOL/L (ref 21–32)
CREAT SERPL-MCNC: 1.2 MG/DL (ref 0.9–1.3)
EKG ATRIAL RATE: 67 BPM
EKG ATRIAL RATE: 68 BPM
EKG DIAGNOSIS: NORMAL
EKG DIAGNOSIS: NORMAL
EKG P AXIS: 25 DEGREES
EKG P AXIS: 50 DEGREES
EKG P-R INTERVAL: 216 MS
EKG P-R INTERVAL: 220 MS
EKG Q-T INTERVAL: 388 MS
EKG Q-T INTERVAL: 400 MS
EKG QRS DURATION: 90 MS
EKG QRS DURATION: 90 MS
EKG QTC CALCULATION (BAZETT): 412 MS
EKG QTC CALCULATION (BAZETT): 422 MS
EKG R AXIS: 62 DEGREES
EKG R AXIS: 70 DEGREES
EKG T AXIS: 57 DEGREES
EKG T AXIS: 57 DEGREES
EKG VENTRICULAR RATE: 67 BPM
EKG VENTRICULAR RATE: 68 BPM
GFR AFRICAN AMERICAN: >60 ML/MIN/1.73M2
GFR NON-AFRICAN AMERICAN: >60 ML/MIN/1.73M2
GLUCOSE BLD-MCNC: 108 MG/DL (ref 70–99)
HCT VFR BLD CALC: 42.4 % (ref 42–52)
HEMOGLOBIN: 13.5 GM/DL (ref 13.5–18)
MAGNESIUM: 2.2 MG/DL (ref 1.8–2.4)
MCH RBC QN AUTO: 30.9 PG (ref 27–31)
MCHC RBC AUTO-ENTMCNC: 31.8 % (ref 32–36)
MCV RBC AUTO: 97 FL (ref 78–100)
PDW BLD-RTO: 13.2 % (ref 11.7–14.9)
PHOSPHORUS: 4.9 MG/DL (ref 2.5–4.9)
PLATELET # BLD: 197 K/CU MM (ref 140–440)
PMV BLD AUTO: 9.5 FL (ref 7.5–11.1)
POTASSIUM SERPL-SCNC: 4.5 MMOL/L (ref 3.5–5.1)
RBC # BLD: 4.37 M/CU MM (ref 4.6–6.2)
SODIUM BLD-SCNC: 139 MMOL/L (ref 135–145)
WBC # BLD: 7.2 K/CU MM (ref 4–10.5)

## 2019-05-09 PROCEDURE — 93010 ELECTROCARDIOGRAM REPORT: CPT | Performed by: INTERNAL MEDICINE

## 2019-05-09 PROCEDURE — 83735 ASSAY OF MAGNESIUM: CPT

## 2019-05-09 PROCEDURE — 99239 HOSP IP/OBS DSCHRG MGMT >30: CPT | Performed by: NURSE PRACTITIONER

## 2019-05-09 PROCEDURE — 93005 ELECTROCARDIOGRAM TRACING: CPT | Performed by: INTERNAL MEDICINE

## 2019-05-09 PROCEDURE — 94761 N-INVAS EAR/PLS OXIMETRY MLT: CPT

## 2019-05-09 PROCEDURE — 85027 COMPLETE CBC AUTOMATED: CPT

## 2019-05-09 PROCEDURE — 6370000000 HC RX 637 (ALT 250 FOR IP): Performed by: INTERNAL MEDICINE

## 2019-05-09 PROCEDURE — 80069 RENAL FUNCTION PANEL: CPT

## 2019-05-09 RX ORDER — SOTALOL HYDROCHLORIDE 80 MG/1
80 TABLET ORAL 2 TIMES DAILY
Qty: 60 TABLET | Refills: 3 | Status: SHIPPED | OUTPATIENT
Start: 2019-05-09 | End: 2019-11-13 | Stop reason: SDUPTHER

## 2019-05-09 RX ADMIN — SOTALOL HYDROCHLORIDE 80 MG: 80 TABLET ORAL at 08:40

## 2019-05-09 RX ADMIN — RIVAROXABAN 20 MG: 20 TABLET, FILM COATED ORAL at 08:40

## 2019-05-09 RX ADMIN — AMLODIPINE BESYLATE 5 MG: 5 TABLET ORAL at 08:40

## 2019-05-09 RX ADMIN — LISINOPRIL 20 MG: 5 TABLET ORAL at 08:40

## 2019-05-09 RX ADMIN — ALLOPURINOL 300 MG: 300 TABLET ORAL at 08:40

## 2019-05-09 NOTE — DISCHARGE SUMMARY
[facility]  Discharge Summary    Noel Holliday  :  1955  MRN:  [id number]    Admit date:  2019  Discharge date:      Admitting Physician:  Aisha Lopez MD    Discharge Diagnoses:    S/P Sotalol Monitoring Therapy  Persistent Atrial Fibrillation    Patient Active Problem List   Diagnosis    Hypertension    Gout    Hyperlipidemia    Benign prostatic hyperplasia    Impaired fasting glucose    Peripheral neuropathy    Persistent atrial fibrillation (HCC)    Tubular adenoma of colon    Encounter for monitoring sotalol therapy       Admission Condition:  fair    Discharged Condition:  good    Hospital Course:   Patient has a history of persistent atrial fibrillation. He presented for KARIS/Cardioversion with Sotalol Monitoring Therapy. The Cardioversion was successful and it was decided to proceed with the Sotalol Monitoring Therapy. An EKG was obtained 1 hour before each scheduled dose of Sotalol was to be given. The QTc was then called to the physician. The Sotalol was dose according to the QTc. After 72 hours the patient is stable for discharge. During this admission he was found to have intermittent Mobitz Type 1 that only occurred while sleeping. Will order sleep apnea link to rule out sleep apnea.      Current Discharge Medication List           Details   sotalol (BETAPACE) 80 MG tablet Take 1 tablet by mouth 2 times daily  Qty: 60 tablet, Refills: 3              Details   rivaroxaban (XARELTO) 20 MG TABS tablet Take 1 tablet by mouth daily (with breakfast)  Qty: 28 tablet, Refills: 0    Comments: Lot # [id number]  Exp 2021  4 bottles =28 tablets      atorvastatin (LIPITOR) 10 MG tablet TAKE 1 TABLET BY MOUTH ONE TIME A DAY   Qty: 90 tablet, Refills: 0    Associated Diagnoses: Mixed hyperlipidemia      lisinopril (PRINIVIL;ZESTRIL) 20 MG tablet Take 1 tablet by mouth daily  Qty: 90 tablet, Refills: 1    Associated Diagnoses: Essential hypertension      amLODIPine (NORVASC) 5 MG tablet Take 1 tablet by mouth daily  Qty: 90 tablet, Refills: 1    Associated Diagnoses: Essential hypertension      allopurinol (ZYLOPRIM) 300 MG tablet Take 1 tablet by mouth daily  Qty: 90 tablet, Refills: 1    Associated Diagnoses: Idiopathic gout of foot, unspecified chronicity, unspecified laterality      terazosin (HYTRIN) 1 MG capsule Take 1 capsule by mouth nightly  Qty: 90 capsule, Refills: 1    Associated Diagnoses: Essential hypertension      melatonin (RA MELATONIN) 3 MG TABS tablet Take 1 tablet by mouth daily  Qty: 90 tablet, Refills: 0    Associated Diagnoses: Primary insomnia             Consults:  none    Discharge Exam:  BP (!) 143/85   Pulse 63   Temp 98.2 °F (36.8 °C) (Oral)   Resp 19   Ht 6' 4\" (1.93 m)   Wt 247 lb 3.2 oz (112.1 kg)   SpO2 95%   BMI 30.09 kg/m²   General appearance: alert, appears stated age and cooperative  Head: Normocephalic, without obvious abnormality, atraumatic  Lungs: clear to auscultation bilaterally  Heart: regular rate and rhythm, S1, S2 normal, no murmur, click, rub or gallop  Extremities: extremities normal, atraumatic, no cyanosis or edema  Pulses: 2+ and symmetric  Neurologic: Grossly normal    Disposition:   home    Signed:  Guanakito Farias  5/9/2019, 8:48 AM

## 2019-05-09 NOTE — TELEPHONE ENCOUNTER
[facility] Transitions Initial Follow Up Call    Outreach made within 2 business days of discharge: Yes    Patient: Renetta Black. Patient : 1955   MRN: [id number]  Reason for Admission: There are no discharge diagnoses documented for the most recent discharge. Discharge Date: 18       Spoke with: Jose Alejandro Anne    Discharge department/facility: [facility] Interactive Patient Contact:  Was patient able to fill all prescriptions: Yes  Was patient instructed to bring all medications to the follow-up visit: Yes  Is patient taking all medications as directed in the discharge summary?  Yes  Does patient understand their discharge instructions: Yes  Does patient have questions or concerns that need addressed prior to 7-14 day follow up office visit: no    Scheduled appointment with PCP within 7-14 days    Follow Up  Future Appointments   Date Time Provider Cherelle Stock   2019  1:15 PM Deandre Cole MD [facility]   5/15/2019  4:40 PM Jesus Banks APRN - CNP [facility]   2019  8:45 AM Brian Matthew MD [facility]   2019  3:20 PM Justin Rodriguez MD [facility] [place] [address] [facility]   @[email or web address]

## 2019-05-10 DIAGNOSIS — G47.33 OSA (OBSTRUCTIVE SLEEP APNEA): Primary | ICD-10-CM

## 2019-05-10 LAB
EKG ATRIAL RATE: 58 BPM
EKG ATRIAL RATE: 72 BPM
EKG DIAGNOSIS: NORMAL
EKG DIAGNOSIS: NORMAL
EKG P AXIS: 13 DEGREES
EKG P AXIS: 59 DEGREES
EKG P-R INTERVAL: 200 MS
EKG P-R INTERVAL: 210 MS
EKG Q-T INTERVAL: 378 MS
EKG Q-T INTERVAL: 418 MS
EKG QRS DURATION: 92 MS
EKG QRS DURATION: 94 MS
EKG QTC CALCULATION (BAZETT): 410 MS
EKG QTC CALCULATION (BAZETT): 413 MS
EKG R AXIS: 62 DEGREES
EKG R AXIS: 69 DEGREES
EKG T AXIS: 52 DEGREES
EKG T AXIS: 56 DEGREES
EKG VENTRICULAR RATE: 58 BPM
EKG VENTRICULAR RATE: 72 BPM

## 2019-05-10 PROCEDURE — 93010 ELECTROCARDIOGRAM REPORT: CPT | Performed by: INTERNAL MEDICINE

## 2019-05-13 ENCOUNTER — OFFICE VISIT (OUTPATIENT)
Dept: INTERNAL MEDICINE CLINIC | Age: 64
End: 2019-05-13
Payer: MEDICARE

## 2019-05-13 VITALS
BODY MASS INDEX: 31.68 KG/M2 | RESPIRATION RATE: 20 BRPM | DIASTOLIC BLOOD PRESSURE: 84 MMHG | SYSTOLIC BLOOD PRESSURE: 140 MMHG | HEART RATE: 62 BPM | HEIGHT: 76 IN | OXYGEN SATURATION: 98 % | WEIGHT: 260.2 LBS

## 2019-05-13 DIAGNOSIS — M10.079 IDIOPATHIC GOUT OF FOOT, UNSPECIFIED CHRONICITY, UNSPECIFIED LATERALITY: ICD-10-CM

## 2019-05-13 DIAGNOSIS — I10 ESSENTIAL HYPERTENSION: ICD-10-CM

## 2019-05-13 DIAGNOSIS — I48.19 PERSISTENT ATRIAL FIBRILLATION (HCC): Primary | ICD-10-CM

## 2019-05-13 DIAGNOSIS — B00.2 ORAL HERPES SIMPLEX INFECTION: ICD-10-CM

## 2019-05-13 DIAGNOSIS — E78.2 MIXED HYPERLIPIDEMIA: ICD-10-CM

## 2019-05-13 PROCEDURE — 99495 TRANSJ CARE MGMT MOD F2F 14D: CPT | Performed by: INTERNAL MEDICINE

## 2019-05-13 PROCEDURE — 1111F DSCHRG MED/CURRENT MED MERGE: CPT | Performed by: INTERNAL MEDICINE

## 2019-05-13 RX ORDER — INDOMETHACIN 50 MG/1
50 CAPSULE ORAL 3 TIMES DAILY
Qty: 90 CAPSULE | Refills: 2 | Status: SHIPPED | OUTPATIENT
Start: 2019-05-13 | End: 2019-07-18 | Stop reason: SDUPTHER

## 2019-05-13 RX ORDER — ACYCLOVIR 400 MG/1
400 TABLET ORAL 3 TIMES DAILY
Qty: 30 TABLET | Refills: 1 | Status: SHIPPED | OUTPATIENT
Start: 2019-05-13 | End: 2019-05-23

## 2019-05-13 ASSESSMENT — PATIENT HEALTH QUESTIONNAIRE - PHQ9
SUM OF ALL RESPONSES TO PHQ QUESTIONS 1-9: 0
SUM OF ALL RESPONSES TO PHQ QUESTIONS 1-9: 0
1. LITTLE INTEREST OR PLEASURE IN DOING THINGS: 0
2. FEELING DOWN, DEPRESSED OR HOPELESS: 0
DEPRESSION UNABLE TO ASSESS: FUNCTIONAL CAPACITY MOTIVATION LIMITS ACCURACY
SUM OF ALL RESPONSES TO PHQ9 QUESTIONS 1 & 2: 0

## 2019-05-13 NOTE — PROGRESS NOTES
Post-Discharge Transitional Care Management Services or Hospital Follow Up      Josef Dominguez Sr. YOB: 1955    Date of Office Visit:  5/13/2019  Date of Hospital Admission: 5/6/19  Date of Hospital Discharge: 5/9/19  Readmission Risk Score(high >=14%. Medium >=10%):Readmission Risk Score: 7      Care management risk score Rising risk (score 2-5) and Complex Care (Scores >=6): 1     Non face to face  following discharge, date last encounter closed (first attempt may have been earlier): 5/9/2019  9:22 AM 5/9/2019  9:22 AM    Call initiated 2 business days of discharge: Yes     Patient Active Problem List   Diagnosis    Hypertension    Gout    Hyperlipidemia    Benign prostatic hyperplasia    Impaired fasting glucose    Peripheral neuropathy    Persistent atrial fibrillation (HCC)    Tubular adenoma of colon    Encounter for monitoring sotalol therapy       No Known Allergies    Medications listed as ordered at the time of discharge from hospital   Irwin BONILLA Sr.    Home Medication Instructions LILLIAN:    Printed on:05/13/19 9412   Medication Information                      allopurinol (ZYLOPRIM) 300 MG tablet  Take 1 tablet by mouth daily             amLODIPine (NORVASC) 5 MG tablet  Take 1 tablet by mouth daily             atorvastatin (LIPITOR) 10 MG tablet  TAKE 1 TABLET BY MOUTH ONE TIME A DAY              lisinopril (PRINIVIL;ZESTRIL) 20 MG tablet  Take 1 tablet by mouth daily             melatonin (RA MELATONIN) 3 MG TABS tablet  Take 1 tablet by mouth daily             rivaroxaban (XARELTO) 20 MG TABS tablet  Take 1 tablet by mouth daily (with breakfast)             sotalol (BETAPACE) 80 MG tablet  Take 1 tablet by mouth 2 times daily             terazosin (HYTRIN) 1 MG capsule  Take 1 capsule by mouth nightly                   Medications marked \"taking\" at this time  Outpatient Medications Marked as Taking for the 5/13/19 encounter (Office Visit) with Sana Sánchez MD Medication Sig Dispense Refill    sotalol (BETAPACE) 80 MG tablet Take 1 tablet by mouth 2 times daily 60 tablet 3    rivaroxaban (XARELTO) 20 MG TABS tablet Take 1 tablet by mouth daily (with breakfast) 28 tablet 0    atorvastatin (LIPITOR) 10 MG tablet TAKE 1 TABLET BY MOUTH ONE TIME A DAY  90 tablet 0    lisinopril (PRINIVIL;ZESTRIL) 20 MG tablet Take 1 tablet by mouth daily 90 tablet 1    amLODIPine (NORVASC) 5 MG tablet Take 1 tablet by mouth daily 90 tablet 1    allopurinol (ZYLOPRIM) 300 MG tablet Take 1 tablet by mouth daily 90 tablet 1    melatonin (RA MELATONIN) 3 MG TABS tablet Take 1 tablet by mouth daily (Patient taking differently: Take 3 mg by mouth nightly as needed ) 90 tablet 0    terazosin (HYTRIN) 1 MG capsule Take 1 capsule by mouth nightly 90 capsule 1        Medications patient taking as of now reconciled against medications ordered at time of hospital discharge: Yes    Chief Complaint   Patient presents with    Follow-Up from Hospital       HPI    Inpatient course: Discharge summary reviewed- see chart. Hospital Course:   Patient has a history of persistent atrial fibrillation. He presented for KARIS/Cardioversion with Sotalol Monitoring Therapy. The Cardioversion was successful and it was decided to proceed with the Sotalol Monitoring Therapy. An EKG was obtained 1 hour before each scheduled dose of Sotalol was to be given. The QTc was then called to the physician. The Sotalol was dose according to the QTc. After 72 hours the patient is stable for discharge. During this admission he was found to have intermittent Mobitz Type 1 that only occurred while sleeping. Will order sleep apnea link to rule out sleep apnea.       Discharge Medications            Interval history/Current status: stayed in hosp for 3d w sotalol loading and had successful cardioversion. Has no palpitations, cp or sob. States no change in overall energy level but also did not feel bad when was in Afib.   Has f/u w Dr Floyd Bolivar for recheck EKG later this week [facility]>  Is off lopressor    Lipids:  Is continuing statin therapy and low fat diet. Tolerating medications w/o myalgias or GI upset. Also on anticoagulation w/o bleeding or bruising. Periodic oral HSV noted, not tried acyclovir    Review of Systems    Vitals:    05/13/19 1313 05/13/19 1331   BP: (!) 151/92 (!) 140/84   Pulse: 62    Resp: 20    SpO2: 98%    Weight: 260 lb 3.2 oz (118 kg)    Height: 6' 4\" (1.93 m)      Body mass index is 31.67 kg/m². Wt Readings from Last 3 Encounters:   05/13/19 260 lb 3.2 oz (118 kg)   05/09/19 247 lb 3.2 oz (112.1 kg)   04/24/19 259 lb (117.5 kg)     BP Readings from Last 3 Encounters:   05/13/19 (!) 151/92   05/09/19 (!) 143/85   05/06/19 (!) 86/59       Physical Exam   Constitutional: He appears well-developed and well-nourished. No distress. HENT:   Head: Normocephalic and atraumatic. Nose: Nose normal.   Mouth/Throat: Oropharynx is clear and moist. No oropharyngeal exudate (small herpetic sore middle of upper lip). Eyes: Pupils are equal, round, and reactive to light. Conjunctivae and EOM are normal. Right eye exhibits no discharge. Left eye exhibits no discharge. No scleral icterus. Neck: Normal range of motion. Neck supple. No tracheal deviation present. Cardiovascular: Normal rate, regular rhythm, normal heart sounds and intact distal pulses. Exam reveals no gallop and no friction rub. No murmur heard. Pulmonary/Chest: Effort normal and breath sounds normal. No respiratory distress. He has no wheezes. He has no rales. Abdominal: Soft. Bowel sounds are normal. He exhibits no distension and no mass. There is no tenderness. There is no rebound and no guarding. Musculoskeletal: He exhibits no edema or tenderness. Lymphadenopathy:     He has no cervical adenopathy. Neurological: He is alert. He has normal reflexes. Skin: Skin is warm and dry. Psychiatric: He has a normal mood and affect.    Vitals reviewed. Assessment/Plan:  1. Persistent atrial fibrillation (HCC)  NOW STABLE AND BACK IN NSR AFTER ABLATION AND SOTALOL LOADING. FOR F/U DR Rosemary Matthews LATER THIS WEEK. - Lipid Panel; Future  - Comprehensive Metabolic Panel; Future  - CBC Auto Differential; Future  - NM DISCHARGE MEDS RECONCILED W/ CURRENT OUTPATIENT MED LIST    2. Essential hypertension  MEDS REVIEWED AND STABLE NOW OFF LOPRESSOR TOO. REMAINS ON NORVASC  - Lipid Panel; Future  - Comprehensive Metabolic Panel; Future  - CBC Auto Differential; Future  - TSH without Reflex; Future  - NM DISCHARGE MEDS RECONCILED W/ CURRENT OUTPATIENT MED LIST    3. Mixed hyperlipidemia  Pt will continue to work on a low fat diet and also exercise, wt loss as appropriate. Will continue periodic monitoring of fasting lipid profile, glucose, liver function.    - Lipid Panel; Future  - Comprehensive Metabolic Panel; Future  - CBC Auto Differential; Future  - TSH without Reflex; Future  - NM DISCHARGE MEDS RECONCILED W/ CURRENT OUTPATIENT MED LIST    4. Oral herpes simplex infection   clinically c/w presentation,  treatment as noted below- To call back one week if not improving. Can use rx prn  - acyclovir (ZOVIRAX) 400 MG tablet; Take 1 tablet by mouth 3 times daily for 10 days  Dispense: 30 tablet; Refill: 1  - NM DISCHARGE MEDS RECONCILED W/ CURRENT OUTPATIENT MED LIST    5. Idiopathic gout of foot, unspecified chronicity, unspecified laterality  Stable w prn use indocine and rf due, using rarely and sparingly given his anticoagulation, not taking regularly  - indomethacin (INDOCIN) 50 MG capsule; Take 1 capsule by mouth 3 times daily  Dispense: 90 capsule;  Refill: 2  - NM DISCHARGE MEDS RECONCILED W/ CURRENT OUTPATIENT MED LIST        Medical Decision Making: moderate complexity

## 2019-05-17 ENCOUNTER — OFFICE VISIT (OUTPATIENT)
Dept: CARDIOLOGY CLINIC | Age: 64
End: 2019-05-17
Payer: MEDICARE

## 2019-05-17 VITALS
WEIGHT: 264.8 LBS | HEART RATE: 72 BPM | SYSTOLIC BLOOD PRESSURE: 118 MMHG | HEIGHT: 76 IN | BODY MASS INDEX: 32.25 KG/M2 | DIASTOLIC BLOOD PRESSURE: 80 MMHG

## 2019-05-17 DIAGNOSIS — I48.19 PERSISTENT ATRIAL FIBRILLATION (HCC): Primary | ICD-10-CM

## 2019-05-17 PROCEDURE — 99212 OFFICE O/P EST SF 10 MIN: CPT | Performed by: NURSE PRACTITIONER

## 2019-05-17 PROCEDURE — 3017F COLORECTAL CA SCREEN DOC REV: CPT | Performed by: NURSE PRACTITIONER

## 2019-05-17 PROCEDURE — 1123F ACP DISCUSS/DSCN MKR DOCD: CPT | Performed by: NURSE PRACTITIONER

## 2019-05-17 PROCEDURE — 4040F PNEUMOC VAC/ADMIN/RCVD: CPT | Performed by: NURSE PRACTITIONER

## 2019-05-17 PROCEDURE — G8417 CALC BMI ABV UP PARAM F/U: HCPCS | Performed by: NURSE PRACTITIONER

## 2019-05-17 PROCEDURE — 93000 ELECTROCARDIOGRAM COMPLETE: CPT | Performed by: NURSE PRACTITIONER

## 2019-05-17 PROCEDURE — G8427 DOCREV CUR MEDS BY ELIG CLIN: HCPCS | Performed by: NURSE PRACTITIONER

## 2019-05-17 PROCEDURE — 1111F DSCHRG MED/CURRENT MED MERGE: CPT | Performed by: NURSE PRACTITIONER

## 2019-05-17 PROCEDURE — 1036F TOBACCO NON-USER: CPT | Performed by: NURSE PRACTITIONER

## 2019-05-17 ASSESSMENT — ENCOUNTER SYMPTOMS
COUGH: 0
BLOOD IN STOOL: 0
EYE PAIN: 0
WHEEZING: 0
PHOTOPHOBIA: 0
VOMITING: 0
CONSTIPATION: 0
DIARRHEA: 0
ABDOMINAL PAIN: 0
SHORTNESS OF BREATH: 0
BACK PAIN: 0
NAUSEA: 0
CHEST TIGHTNESS: 0
COLOR CHANGE: 0

## 2019-05-17 NOTE — PROGRESS NOTES
[id number] Score for Atrial Fibrillation Stroke Risk   Risk   Factors  Component Value   C CHF No 0   H HTN Yes 1   A2 Age >= 76 No,  (66 y.o.) 0   D DM No 0   S2 Prior Stroke/TIA No 0   V Vascular Disease No 0   A Age 74-69 Yes,  (66 y.o.) 1   Sc Sex male 0    [id number]  Score  2   Score last updated 0/66/50 22:36 PM    Click here for a link to the UpToDate guideline \"Atrial Fibrillation: Anticoagulation therapy to prevent embolization    Disclaimer: Risk Score calculation is dependent on accuracy of patient problem list and past encounter diagnosis.

## 2019-05-17 NOTE — PROGRESS NOTES
Electrophysiology Consult Note      Reason for consultation: atrial fibrillation    Chief complaint : atrial fibrillation    Referring physician:      Primary care physician: Vinie Cowden, MD      History of Present Illness: This visit (5/17/2019)  Chief Complaint   Patient presents with    Atrial Fibrillation     Patient is here for follow up on recent cardioversion with Sotalol monitoring therapy. Patient reports that he is feeling well. Denies palpitaitons, shortness of breath, chest pain, dizzines or syncope. Previous visit  Chief Complaint   Patient presents with   Feng Matt patient here for consult for Afib. Patient denies palpitations, chest pain. He reports he is usual self. Reports tiredness and weakness and he attributes to his age at times. Patient has no shortness of breath at rest but occasional with exertion. Patient denies dizziness or syncope Patient states he was unaware he had afib when his primary care diagnosed it one year ago. Patient reports alcohol occassionally as well as 1 cup of coffee daily.          Pastmedical history:   Past Medical History:   Diagnosis Date    Arthritis     feet, elbows, fingers    BPH (benign prostatic hypertrophy)     Depression     Erectile dysfunction     Family history of diabetes mellitus     mother brothers/sisters    Gout, unspecified     last flare  April-May 2018    History of exercise stress test 05/04/2017    treadmill    Hx of Doppler echocardiogram 08/29/2018    EF55-60%,mildly dilated left atrium    Hyperlipidemia     Hypertension     Impaired fasting glucose     prediabetic    New onset atrial fibrillation ([address] 04/13/2017    CHRONICALLY IRREG--- 4/12/17 ECG by PCP on routine visit- seeing Dr Naomi Matt, on toprol and ASA, no anticoagulation w low CHADS    Other testicular hypofunction     Peripheral neuropathy     ?possibly fr etoh- mild intake ~4-6 [place] iced teas/week    Persistent atrial fibrillation (HCC) 11/9/2017    Sleep disturbance, unspecified     Tubular adenoma of colon 06/26/2018    Dr Saunders Boxer, recheck one yr       Surgical history :   Past Surgical History:   Procedure Laterality Date    COLECTOMY  08/07/2018    COLONOSCOPY  06/26/2018    Grade 2 Internal hemorrhoids, 4cm polyp - surgical consult needed    DENTAL SURGERY  11/2016    full dental restoration    EYE SURGERY Bilateral 2013    cataracts    HEMICOLECTOMY  08/10/2018    robotic    HERNIA REPAIR  early [address]    Umbilical     VASECTOMY  1982       Family history:   Family History   Problem Relation Age of Onset    Heart Disease Mother     Diabetes Mother     Heart Disease Father     Diabetes Sister     Diabetes Brother     Heart Disease Brother     Heart Surgery Brother         CABG    Diabetes Brother     Diabetes Brother        Social history :  reports that he has never smoked. He has never used smokeless tobacco. He reports that he drinks alcohol. He reports that he does not use drugs.     No Known Allergies    Current Outpatient Medications on File Prior to Visit   Medication Sig Dispense Refill    acyclovir (ZOVIRAX) 400 MG tablet Take 1 tablet by mouth 3 times daily for 10 days 30 tablet 1    indomethacin (INDOCIN) 50 MG capsule Take 1 capsule by mouth 3 times daily 90 capsule 2    sotalol (BETAPACE) 80 MG tablet Take 1 tablet by mouth 2 times daily 60 tablet 3    rivaroxaban (XARELTO) 20 MG TABS tablet Take 1 tablet by mouth daily (with breakfast) 28 tablet 0    atorvastatin (LIPITOR) 10 MG tablet TAKE 1 TABLET BY MOUTH ONE TIME A DAY  90 tablet 0    lisinopril (PRINIVIL;ZESTRIL) 20 MG tablet Take 1 tablet by mouth daily 90 tablet 1    amLODIPine (NORVASC) 5 MG tablet Take 1 tablet by mouth daily 90 tablet 1    allopurinol (ZYLOPRIM) 300 MG tablet Take 1 tablet by mouth daily 90 tablet 1    melatonin (RA MELATONIN) 3 MG TABS tablet Take 1 tablet by mouth daily (Patient taking differently: Take 3 mg by mouth nightly as needed ) 90 tablet 0    terazosin (HYTRIN) 1 MG capsule Take 1 capsule by mouth nightly 90 capsule 1     No current facility-administered medications on file prior to visit. Review of Systems:   Review of Systems   Constitutional: Negative for activity change, chills, fatigue and fever. HENT: Negative for congestion, ear pain and tinnitus. Eyes: Negative for photophobia, pain and visual disturbance. Respiratory: Negative for cough, chest tightness, shortness of breath and wheezing. Cardiovascular: Negative for chest pain, palpitations and leg swelling. Gastrointestinal: Negative for abdominal pain, blood in stool, constipation, diarrhea, nausea and vomiting. Endocrine: Negative for cold intolerance and heat intolerance. Genitourinary: Negative for dysuria, flank pain and hematuria. Musculoskeletal: Negative for arthralgias, back pain, myalgias and neck stiffness. Skin: Negative for color change and rash. Allergic/Immunologic: Negative for food allergies. Neurological: Negative for dizziness, light-headedness, numbness and headaches. Hematological: Does not bruise/bleed easily. Psychiatric/Behavioral: Negative for agitation, behavioral problems and confusion. Physical Examination:    /80   Pulse 72   Ht 6' 4\" (1.93 m)   Wt 264 lb 12.8 oz (120.1 kg)   BMI 32.23 kg/m²    Wt Readings from Last 3 Encounters:   05/17/19 264 lb 12.8 oz (120.1 kg)   05/13/19 260 lb 3.2 oz (118 kg)   05/09/19 247 lb 3.2 oz (112.1 kg)     Body mass index is 32.23 kg/m². Physical Exam   Constitutional: He is oriented to person, place, and time. He appears well-developed and well-nourished. No distress. HENT:   Head: Normocephalic and atraumatic. Eyes: Pupils are equal, round, and reactive to light. Conjunctivae are normal. Right eye exhibits no discharge. Left eye exhibits no discharge. Neck: Normal range of motion.  No

## 2019-05-23 ENCOUNTER — HOSPITAL ENCOUNTER (OUTPATIENT)
Dept: SLEEP CENTER | Age: 64
Discharge: HOME | End: 2019-05-23
Payer: MEDICARE

## 2019-05-23 VITALS
HEART RATE: 70 BPM | BODY MASS INDEX: 31.87 KG/M2 | DIASTOLIC BLOOD PRESSURE: 82 MMHG | SYSTOLIC BLOOD PRESSURE: 136 MMHG | WEIGHT: 261.7 LBS | HEIGHT: 76 IN | OXYGEN SATURATION: 97 %

## 2019-05-23 DIAGNOSIS — G47.33 OSA (OBSTRUCTIVE SLEEP APNEA): ICD-10-CM

## 2019-05-23 DIAGNOSIS — E66.9 OBESITY (BMI 30-39.9): ICD-10-CM

## 2019-05-23 DIAGNOSIS — G47.19 EXCESSIVE DAYTIME SLEEPINESS: ICD-10-CM

## 2019-05-23 PROCEDURE — 99204 OFFICE O/P NEW MOD 45 MIN: CPT | Performed by: INTERNAL MEDICINE

## 2019-05-23 PROCEDURE — 99211 OFF/OP EST MAY X REQ PHY/QHP: CPT | Performed by: INTERNAL MEDICINE

## 2019-05-23 ASSESSMENT — SLEEP AND FATIGUE QUESTIONNAIRES
HOW LIKELY ARE YOU TO NOD OFF OR FALL ASLEEP WHILE LYING DOWN TO REST IN THE AFTERNOON WHEN CIRCUMSTANCES PERMIT: 2
HOW LIKELY ARE YOU TO NOD OFF OR FALL ASLEEP WHILE SITTING QUIETLY AFTER LUNCH WITHOUT ALCOHOL: 0
HOW LIKELY ARE YOU TO NOD OFF OR FALL ASLEEP WHEN YOU ARE A PASSENGER IN A CAR FOR AN HOUR WITHOUT A BREAK: 1
HOW LIKELY ARE YOU TO NOD OFF OR FALL ASLEEP IN A CAR, WHILE STOPPED FOR A FEW MINUTES IN TRAFFIC: 0
HOW LIKELY ARE YOU TO NOD OFF OR FALL ASLEEP WHILE SITTING AND READING: 1
HOW LIKELY ARE YOU TO NOD OFF OR FALL ASLEEP WHILE WATCHING TV: 2
ESS TOTAL SCORE: 6
HOW LIKELY ARE YOU TO NOD OFF OR FALL ASLEEP WHILE SITTING AND TALKING TO SOMEONE: 0
HOW LIKELY ARE YOU TO NOD OFF OR FALL ASLEEP WHILE SITTING INACTIVE IN A PUBLIC PLACE: 0

## 2019-05-23 NOTE — CONSULTS
Marta George MD, Anupama mUana MD, Conchis Hernandez MD, Paola Arevalo MD, [facility]      [address], [address] [facility]   PH: [phone]  F: [phone]     Subjective:     Patient ID: Aung Rowe is a 75 y.o. male, referred to the sleep center for   Chief Complaint   Patient presents with    Sleep Apnea   . Referring physician:  Alyx Fan    History:  Mr. Srikanth Coello has been referred for SUNIL. He has gained about 5 lbs in the last 1 year. He goes to bed at 10 PM and it takes about 15 mins to fall asleep. He snores and not sure if she stops breathing. He wakes up 2 to 3 times in the night to go to the bathroom. He woke up occasionally gasping for air, no dry mouth or palpitations, He gets up at 6:30 am and he is not tired. He has no morning headaches. He is tired in the afternoon. He has no RLS. He has no sleep paralysis, no cataplexy, no hypnogogic or hypnopompic hallucinations. He has no depression or anxiety. He has no h/o smoking. He is not a shift worker.       Social History     Socioeconomic History    Marital status:      Spouse name: Not on file    Number of children: Not on file    Years of education: Not on file    Highest education level: Not on file   Occupational History    Occupation: construction     Comment: self employed   Social Needs    Financial resource strain: Not on file    Food insecurity:     Worry: Not on file     Inability: Not on file   Kipo needs:     Medical: Not on file     Non-medical: Not on file   Tobacco Use    Smoking status: Never Smoker    Smokeless tobacco: Never Used   Substance and Sexual Activity    Alcohol use: Yes     Comment: Occasional    Drug use: No    Sexual activity: Not on file   Lifestyle    Physical activity:     Days per week: Not on file     Minutes per session: Not on file    Stress: Not on file   Relationships     Gout    Hyperlipidemia    Benign prostatic hyperplasia    Impaired fasting glucose    Peripheral neuropathy    Persistent atrial fibrillation (HCC)    Tubular adenoma of colon    Encounter for monitoring sotalol therapy    Obesity (BMI 30-39. 9)    SUNIL (obstructive sleep apnea)    Excessive daytime sleepiness       Past Medical History:   Diagnosis Date    Arthritis     feet, elbows, fingers    BPH (benign prostatic hypertrophy)     Depression     Erectile dysfunction     Family history of diabetes mellitus     mother brothers/sisters    Gout, unspecified     last flare  April-May 2018    History of exercise stress test 05/04/2017    treadmill    Hx of Doppler echocardiogram 08/29/2018    EF55-60%,mildly dilated left atrium    Hyperlipidemia     Hypertension     Impaired fasting glucose     prediabetic    New onset atrial fibrillation ([facility] [address] 04/13/2017    CHRONICALLY IRREG--- 4/12/17 ECG by PCP on routine visit- seeing Dr Beata [place], on toprol and ASA, no anticoagulation w low CHADS    Other testicular hypofunction     Peripheral neuropathy     ?possibly fr etoh- mild intake ~4-6 [place] iced teas/week    Persistent atrial fibrillation ([facility] [address] 11/9/2017    Sleep disturbance, unspecified     Tubular adenoma of colon 06/26/2018    Dr Marie Verde, recheck one yr       Past Surgical History:   Procedure Laterality Date    COLECTOMY  08/07/2018    COLONOSCOPY  06/26/2018    Grade 2 Internal hemorrhoids, 4cm polyp - surgical consult needed    DENTAL SURGERY  11/2016    full dental restoration    EYE SURGERY Bilateral 2013    cataracts    HEMICOLECTOMY  08/10/2018    robotic    HERNIA REPAIR  early [address]    Umbilical     VASECTOMY  1982       Family History   Problem Relation Age of Onset    Heart Disease Mother     Diabetes Mother     Heart Disease Father     Diabetes Sister     Diabetes Brother     Heart Disease Brother     Heart Surgery Brother         CABG    Diabetes Brother     study  Loose weight                     Additional Plan:     [x]  Sleep hygiene/ relaxation methods & CBTi principles review with patient   [x]  Avoid supine/back sleep until sleep study   [x]  Driving precautions   [x]  Medical consequences of untreated SUNIL   [x]  Weight loss recommendations   [x]  Diet recommendations   [x]  Exercise   []  Advised to quit smoking       []  PFT referral   []  Bariatric Program referral      Follow-Up:    No follow-ups on file.     Electronically signed by Dorcas Kurtz MD on 5/23/2019 at 9:24 AM

## 2019-06-07 ENCOUNTER — OFFICE VISIT (OUTPATIENT)
Dept: CARDIOLOGY CLINIC | Age: 64
End: 2019-06-07
Payer: MEDICARE

## 2019-06-07 VITALS
DIASTOLIC BLOOD PRESSURE: 86 MMHG | OXYGEN SATURATION: 98 % | WEIGHT: 258.8 LBS | BODY MASS INDEX: 31.51 KG/M2 | HEART RATE: 60 BPM | SYSTOLIC BLOOD PRESSURE: 136 MMHG | HEIGHT: 76 IN

## 2019-06-07 DIAGNOSIS — I48.19 PERSISTENT ATRIAL FIBRILLATION (HCC): ICD-10-CM

## 2019-06-07 DIAGNOSIS — Z51.81 ENCOUNTER FOR MONITORING SOTALOL THERAPY: Primary | ICD-10-CM

## 2019-06-07 DIAGNOSIS — Z79.899 ENCOUNTER FOR MONITORING SOTALOL THERAPY: Primary | ICD-10-CM

## 2019-06-07 DIAGNOSIS — I48.0 PAF (PAROXYSMAL ATRIAL FIBRILLATION) (HCC): ICD-10-CM

## 2019-06-07 PROCEDURE — 1036F TOBACCO NON-USER: CPT | Performed by: INTERNAL MEDICINE

## 2019-06-07 PROCEDURE — 93000 ELECTROCARDIOGRAM COMPLETE: CPT | Performed by: INTERNAL MEDICINE

## 2019-06-07 PROCEDURE — G8417 CALC BMI ABV UP PARAM F/U: HCPCS | Performed by: INTERNAL MEDICINE

## 2019-06-07 PROCEDURE — 3017F COLORECTAL CA SCREEN DOC REV: CPT | Performed by: INTERNAL MEDICINE

## 2019-06-07 PROCEDURE — 1111F DSCHRG MED/CURRENT MED MERGE: CPT | Performed by: INTERNAL MEDICINE

## 2019-06-07 PROCEDURE — G8427 DOCREV CUR MEDS BY ELIG CLIN: HCPCS | Performed by: INTERNAL MEDICINE

## 2019-06-07 PROCEDURE — 99213 OFFICE O/P EST LOW 20 MIN: CPT | Performed by: INTERNAL MEDICINE

## 2019-06-07 PROCEDURE — 4040F PNEUMOC VAC/ADMIN/RCVD: CPT | Performed by: INTERNAL MEDICINE

## 2019-06-07 PROCEDURE — 1123F ACP DISCUSS/DSCN MKR DOCD: CPT | Performed by: INTERNAL MEDICINE

## 2019-06-07 ASSESSMENT — ENCOUNTER SYMPTOMS
PHOTOPHOBIA: 0
CONSTIPATION: 0
NAUSEA: 0
BACK PAIN: 0
DIARRHEA: 0
ABDOMINAL PAIN: 0
SHORTNESS OF BREATH: 0
CHEST TIGHTNESS: 0
BLOOD IN STOOL: 0
EYE PAIN: 0
WHEEZING: 0
COLOR CHANGE: 0
COUGH: 0
VOMITING: 0

## 2019-06-07 NOTE — PROGRESS NOTES
Electrophysiology FU  Note      Reason for consultation: atrial fibrillation    Chief complaint : atrial fibrillation    Referring physician:      Primary care physician: Torie Page MD      History of Present Illness: This visit (6/7/2019)      Chief Complaint   Patient presents with   Eliza Mo patient here for follow up CV and sotalol loading 5/6. Patient denies any CP, SOB, palpitations, dizziness or edema. States he has felt very good since cardioversion. Is still drinking a cup of coffee about every day and will have a few alcoholic drinks with dinner a couple times per week. Previous visit:    Chief Complaint   Patient presents with   Eliza Mo patient here for consult for Afib. Patient denies palpitations, chest pain. He reports he is usual self. Reports tiredness and weakness and he attributes to his age at times. Patient has no shortness of breath at rest but occasional with exertion. Patient denies dizziness or syncope Patient states he was unaware he had afib when his primary care diagnosed it one year ago. Patient reports alcohol occassionally as well as 1 cup of coffee daily.          Pastmedical history:   Past Medical History:   Diagnosis Date    Arthritis     feet, elbows, fingers    BPH (benign prostatic hypertrophy)     Depression     Erectile dysfunction     Family history of diabetes mellitus     mother brothers/sisters    Gout, unspecified     last flare  April-May 2018    History of exercise stress test 05/04/2017    treadmill    Hx of Doppler echocardiogram 08/29/2018    [id number]%,mildly dilated left atrium    Hyperlipidemia     Hypertension     Impaired fasting glucose     prediabetic    New onset atrial fibrillation ([address] 04/13/2017    CHRONICALLY IRREG--- 4/12/17 ECG by PCP on routine visit- seeing Dr Radha Mo, on toprol and ASA, no anticoagulation w low CHADS    Other testicular hypofunction     Peripheral neuropathy     ?possibly fr etoh- mild intake ~4-6 [place] iced teas/week    Persistent atrial fibrillation ([facility] 11/9/2017    Sleep disturbance, unspecified     Tubular adenoma of colon 06/26/2018    Dr Saunders Boxer, recheck one yr       Surgical history :   Past Surgical History:   Procedure Laterality Date    COLECTOMY  08/07/2018    COLONOSCOPY  06/26/2018    Grade 2 Internal hemorrhoids, 4cm polyp - surgical consult needed    DENTAL SURGERY  11/2016    full dental restoration    EYE SURGERY Bilateral 2013    cataracts    HEMICOLECTOMY  08/10/2018    robotic    HERNIA REPAIR  early 1230'T    Umbilical     VASECTOMY  1982       Family history:   Family History   Problem Relation Age of Onset    Heart Disease Mother     Diabetes Mother     Heart Disease Father     Diabetes Sister     Diabetes Brother     Heart Disease Brother     Heart Surgery Brother         CABG    Diabetes Brother     Diabetes Brother        Social history :  reports that he has never smoked. He has never used smokeless tobacco. He reports that he drinks alcohol. He reports that he does not use drugs.     No Known Allergies    Current Outpatient Medications on File Prior to Visit   Medication Sig Dispense Refill    indomethacin (INDOCIN) 50 MG capsule Take 1 capsule by mouth 3 times daily 90 capsule 2    sotalol (BETAPACE) 80 MG tablet Take 1 tablet by mouth 2 times daily 60 tablet 3    rivaroxaban (XARELTO) 20 MG TABS tablet Take 1 tablet by mouth daily (with breakfast) 28 tablet 0    atorvastatin (LIPITOR) 10 MG tablet TAKE 1 TABLET BY MOUTH ONE TIME A DAY  90 tablet 0    lisinopril (PRINIVIL;ZESTRIL) 20 MG tablet Take 1 tablet by mouth daily 90 tablet 1    amLODIPine (NORVASC) 5 MG tablet Take 1 tablet by mouth daily 90 tablet 1    allopurinol (ZYLOPRIM) 300 MG tablet Take 1 tablet by mouth daily 90 tablet 1    melatonin (RA MELATONIN) 3 MG TABS tablet Take 1 tablet by mouth daily (Patient taking differently: Take 3 mg by mouth nightly as needed ) 90 tablet 0    terazosin (HYTRIN) 1 MG capsule Take 1 capsule by mouth nightly 90 capsule 1     No current facility-administered medications on file prior to visit. Review of Systems:   Review of Systems   Constitutional: Positive for fatigue. Negative for activity change, chills and fever. HENT: Negative for congestion, ear pain and tinnitus. Eyes: Negative for photophobia, pain and visual disturbance. Respiratory: Negative for cough, chest tightness, shortness of breath and wheezing. Cardiovascular: Negative for chest pain, palpitations and leg swelling. Gastrointestinal: Negative for abdominal pain, blood in stool, constipation, diarrhea, nausea and vomiting. Endocrine: Negative for cold intolerance and heat intolerance. Genitourinary: Negative for dysuria, flank pain and hematuria. Musculoskeletal: Negative for arthralgias, back pain, myalgias and neck stiffness. Skin: Negative for color change and rash. Allergic/Immunologic: Negative for food allergies. Neurological: Negative for dizziness, light-headedness, numbness and headaches. Hematological: Does not bruise/bleed easily. Psychiatric/Behavioral: Negative for agitation, behavioral problems and confusion. Physical Examination:    /86   Pulse 60   Ht 6' 4\" (1.93 m)   Wt 258 lb 12.8 oz (117.4 kg)   SpO2 98%   BMI 31.50 kg/m²    Wt Readings from Last 3 Encounters:   06/07/19 258 lb 12.8 oz (117.4 kg)   05/23/19 261 lb 11.2 oz (118.7 kg)   05/17/19 264 lb 12.8 oz (120.1 kg)     Body mass index is 31.5 kg/m². Physical Exam   Constitutional: He is oriented to person, place, and time. He appears well-developed and well-nourished. No distress. HENT:   Head: Normocephalic and atraumatic. Eyes: Pupils are equal, round, and reactive to light. EOM are normal.   Neck: Normal range of motion. No JVD present.    Cardiovascular: Normal rate and regular rhythm. Exam reveals no friction rub. No murmur heard. Pulmonary/Chest: Effort normal and breath sounds normal. No respiratory distress. He has no wheezes. He has no rales. Abdominal: Soft. Bowel sounds are normal. He exhibits no distension. There is no tenderness. Musculoskeletal: He exhibits no edema or tenderness. Neurological: He is alert and oriented to person, place, and time. No cranial nerve deficit. Skin: Skin is warm and dry. Psychiatric: He has a normal mood and affect. CBC:   Lab Results   Component Value Date    WBC 7.2 05/09/2019    HGB 13.5 05/09/2019    HCT 42.4 05/09/2019     05/09/2019     Lipids:  Lab Results   Component Value Date    CHOL 190 05/22/2018    TRIG 171 (H) 05/22/2018    HDL 41 05/22/2018    LDLCALC 115 (H) 05/22/2018     PT/INR:   Lab Results   Component Value Date    INR 2.13 05/06/2019        BMP:    Lab Results   Component Value Date     05/09/2019    K 4.5 05/09/2019     05/09/2019    CO2 26 05/09/2019    BUN 20 05/09/2019     CMP:   Lab Results   Component Value Date    AST 19 05/22/2018    PROT 7.4 05/22/2018    BILITOT 0.5 05/22/2018    ALKPHOS 88 05/22/2018     TSH:  No results found for: TSH    EKGINTERPRETATION - EKG Interpretation:  Sinus rhythm, QTC with in range      IMPRESSION / RECOMMENDATIONS:     1. Persistent atrial fibrillation sp cardioversion on sotalol therapy   2. HTN  3. HLD  4. BPH      Patient is doing well - feels much better  Discussed about quitting caffeine and alcohol  Patient wants to attend atrial fibrillation lecture - will schedule for lecture  If recurrence then we will consider ablation    Patient voiced understanding  FU with  and with recurrence will consider ablation      Thanks again for allowing me to participate in care of this patient. Please call me if you have any questions. With best regards.       Hien Murillo MD, 6/7/2019 9:02 AM

## 2019-06-12 ENCOUNTER — HOSPITAL ENCOUNTER (OUTPATIENT)
Dept: SLEEP CENTER | Age: 64
Discharge: HOME | End: 2019-06-12
Payer: MEDICARE

## 2019-06-12 VITALS — WEIGHT: 261 LBS | HEIGHT: 76 IN | BODY MASS INDEX: 31.78 KG/M2

## 2019-06-12 PROCEDURE — 95811 POLYSOM 6/>YRS CPAP 4/> PARM: CPT | Performed by: INTERNAL MEDICINE

## 2019-06-12 PROCEDURE — 95811 POLYSOM 6/>YRS CPAP 4/> PARM: CPT

## 2019-06-13 NOTE — PROGRESS NOTES
6/13/2019  sleep study  for Ijeoma Pierre Sr.  1955 is complete. Results are pending physician review.     Electronically signed by Khushbu Nur on 6/13/2019 at 7:07 AM

## 2019-06-14 DIAGNOSIS — I10 HYPERTENSION: ICD-10-CM

## 2019-06-14 DIAGNOSIS — M10.079 IDIOPATHIC GOUT OF FOOT, UNSPECIFIED CHRONICITY, UNSPECIFIED LATERALITY: ICD-10-CM

## 2019-06-14 DIAGNOSIS — N40.0 BPH (BENIGN PROSTATIC HYPERPLASIA): ICD-10-CM

## 2019-06-14 RX ORDER — ALLOPURINOL 300 MG/1
300 TABLET ORAL DAILY
Qty: 90 TABLET | Refills: 1 | Status: SHIPPED | OUTPATIENT
Start: 2019-06-14 | End: 2019-11-13 | Stop reason: SDUPTHER

## 2019-06-14 RX ORDER — TERAZOSIN 1 MG/1
CAPSULE ORAL
Qty: 90 CAPSULE | Refills: 1 | Status: SHIPPED | OUTPATIENT
Start: 2019-06-14 | End: 2020-04-24 | Stop reason: SDUPTHER

## 2019-06-14 RX ORDER — ALLOPURINOL 300 MG/1
TABLET ORAL
Qty: 90 TABLET | Refills: 1 | Status: SHIPPED | OUTPATIENT
Start: 2019-06-14 | End: 2019-11-13

## 2019-06-19 ENCOUNTER — OFFICE VISIT (OUTPATIENT)
Dept: CARDIOLOGY CLINIC | Age: 64
End: 2019-06-19
Payer: MEDICARE

## 2019-06-19 VITALS
HEART RATE: 74 BPM | SYSTOLIC BLOOD PRESSURE: 132 MMHG | WEIGHT: 257.6 LBS | DIASTOLIC BLOOD PRESSURE: 80 MMHG | HEIGHT: 76 IN | BODY MASS INDEX: 31.37 KG/M2

## 2019-06-19 DIAGNOSIS — E78.2 MIXED HYPERLIPIDEMIA: Primary | ICD-10-CM

## 2019-06-19 DIAGNOSIS — I10 ESSENTIAL HYPERTENSION: ICD-10-CM

## 2019-06-19 DIAGNOSIS — I48.19 PERSISTENT ATRIAL FIBRILLATION (HCC): ICD-10-CM

## 2019-06-19 DIAGNOSIS — G47.33 OSA (OBSTRUCTIVE SLEEP APNEA): ICD-10-CM

## 2019-06-19 DIAGNOSIS — I48.0 PAF (PAROXYSMAL ATRIAL FIBRILLATION) (HCC): ICD-10-CM

## 2019-06-19 PROCEDURE — G8417 CALC BMI ABV UP PARAM F/U: HCPCS | Performed by: INTERNAL MEDICINE

## 2019-06-19 PROCEDURE — 1123F ACP DISCUSS/DSCN MKR DOCD: CPT | Performed by: INTERNAL MEDICINE

## 2019-06-19 PROCEDURE — 1036F TOBACCO NON-USER: CPT | Performed by: INTERNAL MEDICINE

## 2019-06-19 PROCEDURE — 99214 OFFICE O/P EST MOD 30 MIN: CPT | Performed by: INTERNAL MEDICINE

## 2019-06-19 PROCEDURE — G8427 DOCREV CUR MEDS BY ELIG CLIN: HCPCS | Performed by: INTERNAL MEDICINE

## 2019-06-19 PROCEDURE — 4040F PNEUMOC VAC/ADMIN/RCVD: CPT | Performed by: INTERNAL MEDICINE

## 2019-06-19 PROCEDURE — 3017F COLORECTAL CA SCREEN DOC REV: CPT | Performed by: INTERNAL MEDICINE

## 2019-06-19 RX ORDER — ASPIRIN 81 MG/1
81 TABLET ORAL DAILY
Qty: 30 TABLET | Refills: 3 | COMMUNITY
Start: 2019-06-19 | End: 2019-11-13 | Stop reason: SDUPTHER

## 2019-06-19 NOTE — PROGRESS NOTES
Fede Steinberg  1955  Darryl Cueto MD    Chief complaint and HPI:  Roz Buchanan Sr. 68-year-old male follows up for atrial fibrillation, hypertension. He underwent cardioversion by Dr. Christian Toledo on May 6, which was successful and he was started on sotalol and monitored in the hospital for 3 days. He apparently had a sleep study done as an outpatient which was positive for sleep apnea. He is reluctant to use CPAP machine. He has a follow-up with Dr. Jyotsna Barillas pulmonologist.  Verónica Byrd has more energy. Denies any palpitations. Denies any dizziness syncope or near syncope. Rest of the Cardiovascular system review is otherwise unchanged from prior encounter. Past medical history:  has a past medical history of Arthritis, BPH (benign prostatic hypertrophy), Depression, Erectile dysfunction, Family history of diabetes mellitus, Gout, unspecified, History of exercise stress test, Hx of Doppler echocardiogram, Hyperlipidemia, Hypertension, Impaired fasting glucose, New onset atrial fibrillation ([address] Other testicular hypofunction, Peripheral neuropathy, Persistent atrial fibrillation ([address] Sleep disturbance, unspecified, and Tubular adenoma of colon. Past surgical history:  has a past surgical history that includes Vasectomy (1982); Dental surgery (11/2016); Colonoscopy (06/26/2018); hernia repair (early 2000's); eye surgery (Bilateral, 2013); hemicolectomy (08/10/2018); and colectomy (08/07/2018). Social History:   Social History     Tobacco Use    Smoking status: Never Smoker    Smokeless tobacco: Never Used   Substance Use Topics    Alcohol use: Yes     Comment: Occasional     Family history: family history includes Diabetes in his brother, brother, brother, mother, and sister; Heart Disease in his brother, father, and mother; Heart Surgery in his brother. ALLERGIES:  Patient has no known allergies. Prior to Admission medications    Medication Sig Start Date End Date Taking?  Authorizing Provider aspirin EC 81 MG EC tablet Take 1 tablet by mouth daily 6/19/19  Yes Luz Marie MD   allopurinol (ZYLOPRIM) 300 MG tablet TAKE 1 TABLET BY MOUTH ONCE DAILY 6/14/19  Yes Torie Page MD   terazosin (HYTRIN) 1 MG capsule TAKE ONE CAPSULE BY MOUTH IN THE EVENING 6/14/19  Yes Torie Page MD   allopurinol (ZYLOPRIM) 300 MG tablet Take 1 tablet by mouth daily 6/14/19  Yes Torie Page MD   indomethacin (INDOCIN) 50 MG capsule Take 1 capsule by mouth 3 times daily 5/13/19  Yes Torie Page MD   sotalol (BETAPACE) 80 MG tablet Take 1 tablet by mouth 2 times daily 5/9/19  Yes SONNY Daily CNP   atorvastatin (LIPITOR) 10 MG tablet TAKE 1 TABLET BY MOUTH ONE TIME A DAY  4/1/19  Yes Torie Page MD   lisinopril (PRINIVIL;ZESTRIL) 20 MG tablet Take 1 tablet by mouth daily 3/1/19  Yes Torie Page MD   amLODIPine (NORVASC) 5 MG tablet Take 1 tablet by mouth daily 3/1/19  Yes Torie Page MD   melatonin (RA MELATONIN) 3 MG TABS tablet Take 1 tablet by mouth daily  Patient taking differently: Take 3 mg by mouth nightly as needed  8/20/18  Yes Torie Page MD   terazosin (HYTRIN) 1 MG capsule Take 1 capsule by mouth nightly 5/22/18  Yes Torie Page MD     Vitals:    06/19/19 1551   BP: 132/80   Pulse: 74   Weight: 257 lb 9.6 oz (116.8 kg)   Height: 6' 4\" (1.93 m)      Body mass index is 31.36 kg/m². Wt Readings from Last 3 Encounters:   06/19/19 257 lb 9.6 oz (116.8 kg)   06/12/19 261 lb (118.4 kg)   06/07/19 258 lb 12.8 oz (117.4 kg)     Constitutional: Patient is well-built and nourished mildly overweight male in no apparent distress  Eyes: Pupils are equal.    NECK: No JVP or thyromegaly  Cardiovascular:  Auscultation: Irregular otherwise normal S1 and S2.  No murmurs or gallops noted. .  Carotids are negative for bruits.  Abdominal aorta is nonpalpable.  No epigastric bruit noted. Peripheral pulses: 2+ in both feet.   Respiratory:  Respiratory effort is normal.  Breath sounds are clear to auscultation. Extremities:  No edema, clubbing,  Cyanosis, petechiae. SKIN: Warm and well perfused, no pallor or cyanosis  Abdomen:  All the 5 puncture sites from robotic surgery are healing well.  Bowel sounds are normal.   Musculoskeletal:  No spinal deformities noted.  Gait is normal.  Muscle strength is normal.  Neurologic:  Oriented to time, place, and person and non-anxious. No focal neurological deficit noted. Psychiatric: Appears somewhat anxious    6/7/ 2019 ECG reported  Sinus  Bradycardia 58 bpm, WITHIN NORMAL LIMITS    KARIS 5/6/2019 reported  LV=  Normal LVEF  LA=  MILD DILATED  MADI= NO CLOTS  RV= normal  RA=  normal  IAS= Normal  Bubble study=not donefor PFO or ASD  AV= TRICUSPID, No regurgitation or stenosis  MV= trivial regurgitation, no stenosis  TV=mild regurgitation  PV= no significant regurgitation,   Aorta= no significant plaque noted  PUL BELLE FLOW=Systolic blunting, in atrial fibrillation     Impression:  No MADI clot    [id number] Score for Atrial Fibrillation Stroke Risk = 2    LAB REVIEW:    CBC:   Lab Results   Component Value Date    WBC 7.2 05/09/2019    HGB 13.5 05/09/2019    HCT 42.4 05/09/2019     05/09/2019     Lipids:   Lab Results   Component Value Date    CHOL 190 05/22/2018    TRIG 171 (H) 05/22/2018    HDL 41 05/22/2018    LDLCALC 115 (H) 05/22/2018     Renal:   Lab Results   Component Value Date    BUN 20 05/09/2019    CREATININE 1.2 05/09/2019     05/09/2019    K 4.5 05/09/2019     PT/INR:   Lab Results   Component Value Date    INR 2.13 05/06/2019     IMPRESSION and RECOMMENDATIONS:      PAF (paroxysmal atrial fibrillation) ([facility] [address]  Being in normal rhythm. Continue low-dose aspirin and current sotalol therapy. Needs monitoring by follow-up ECG for QTC. He has been warned to let the physicians know before he starts any new medication that he is on sotalol therapy.   Seems to be tolerating it well.    Hypertension  Controlled on current medications continue the same. Hyperlipidemia  Last LDL was 115. Continue current statin therapy. SUNIL (obstructive sleep apnea)  Continue CPAP and follow-up with pulmonary. Ok to hold off Xarelto and start on Aspirin 81 mg daily. Continue current cardiovascular medications which have been reviewed and discussed individually with you. Appropriate prescriptions if needed on this visit are addressed. After visit summery is provided. Questions answered and patient verbalizes understanding. Follow up in office in 6 months with ECG and repeat Echo, sooner if needed. Julia Lutz MD, 6/19/2019 4:32 PM     Please note this report has been partially produced using speech recognition software and may contain errors related to that system including errors in grammar, punctuation, and spelling, as well as words and phrases that may be inappropriate. If there are any questions or concerns please feel free to contact the dictating provider for clarification.

## 2019-06-27 DIAGNOSIS — I10 ESSENTIAL HYPERTENSION: ICD-10-CM

## 2019-06-27 RX ORDER — AMLODIPINE BESYLATE 5 MG/1
TABLET ORAL
Qty: 90 TABLET | Refills: 1 | Status: SHIPPED | OUTPATIENT
Start: 2019-06-27 | End: 2019-11-13 | Stop reason: SDUPTHER

## 2019-07-01 DIAGNOSIS — E78.2 MIXED HYPERLIPIDEMIA: ICD-10-CM

## 2019-07-01 RX ORDER — ATORVASTATIN CALCIUM 10 MG/1
TABLET, FILM COATED ORAL
Qty: 90 TABLET | Refills: 0 | Status: SHIPPED | OUTPATIENT
Start: 2019-07-01 | End: 2020-04-24 | Stop reason: SDUPTHER

## 2019-07-18 DIAGNOSIS — I10 ESSENTIAL HYPERTENSION: ICD-10-CM

## 2019-07-18 DIAGNOSIS — M10.079 IDIOPATHIC GOUT OF FOOT, UNSPECIFIED CHRONICITY, UNSPECIFIED LATERALITY: ICD-10-CM

## 2019-07-18 RX ORDER — LISINOPRIL 20 MG/1
20 TABLET ORAL DAILY
Qty: 90 TABLET | Refills: 1 | Status: SHIPPED | OUTPATIENT
Start: 2019-07-18 | End: 2019-11-13 | Stop reason: SDUPTHER

## 2019-07-18 RX ORDER — INDOMETHACIN 50 MG/1
50 CAPSULE ORAL 3 TIMES DAILY
Qty: 90 CAPSULE | Refills: 2 | Status: SHIPPED | OUTPATIENT
Start: 2019-07-18 | End: 2020-08-14

## 2019-11-13 ENCOUNTER — OFFICE VISIT (OUTPATIENT)
Dept: INTERNAL MEDICINE CLINIC | Age: 64
End: 2019-11-13
Payer: MEDICARE

## 2019-11-13 ENCOUNTER — OFFICE VISIT (OUTPATIENT)
Dept: CARDIOLOGY CLINIC | Age: 64
End: 2019-11-13
Payer: MEDICARE

## 2019-11-13 VITALS
HEART RATE: 82 BPM | WEIGHT: 261 LBS | SYSTOLIC BLOOD PRESSURE: 124 MMHG | OXYGEN SATURATION: 98 % | BODY MASS INDEX: 31.78 KG/M2 | RESPIRATION RATE: 14 BRPM | HEIGHT: 76 IN | DIASTOLIC BLOOD PRESSURE: 72 MMHG

## 2019-11-13 VITALS
HEART RATE: 68 BPM | WEIGHT: 259 LBS | SYSTOLIC BLOOD PRESSURE: 120 MMHG | BODY MASS INDEX: 31.54 KG/M2 | DIASTOLIC BLOOD PRESSURE: 86 MMHG | HEIGHT: 76 IN

## 2019-11-13 DIAGNOSIS — E78.2 MIXED HYPERLIPIDEMIA: ICD-10-CM

## 2019-11-13 DIAGNOSIS — I48.0 PAF (PAROXYSMAL ATRIAL FIBRILLATION) (HCC): ICD-10-CM

## 2019-11-13 DIAGNOSIS — I10 ESSENTIAL HYPERTENSION: ICD-10-CM

## 2019-11-13 DIAGNOSIS — Z00.00 ROUTINE GENERAL MEDICAL EXAMINATION AT A HEALTH CARE FACILITY: Primary | ICD-10-CM

## 2019-11-13 DIAGNOSIS — I48.20 CHRONIC ATRIAL FIBRILLATION (HCC): ICD-10-CM

## 2019-11-13 DIAGNOSIS — I48.0 PAF (PAROXYSMAL ATRIAL FIBRILLATION) (HCC): Primary | ICD-10-CM

## 2019-11-13 DIAGNOSIS — I48.19 PERSISTENT ATRIAL FIBRILLATION (HCC): Primary | ICD-10-CM

## 2019-11-13 DIAGNOSIS — M10.079 IDIOPATHIC GOUT OF FOOT, UNSPECIFIED CHRONICITY, UNSPECIFIED LATERALITY: ICD-10-CM

## 2019-11-13 DIAGNOSIS — D12.6 TUBULAR ADENOMA OF COLON: ICD-10-CM

## 2019-11-13 PROCEDURE — 4040F PNEUMOC VAC/ADMIN/RCVD: CPT | Performed by: INTERNAL MEDICINE

## 2019-11-13 PROCEDURE — 99214 OFFICE O/P EST MOD 30 MIN: CPT | Performed by: INTERNAL MEDICINE

## 2019-11-13 PROCEDURE — 1123F ACP DISCUSS/DSCN MKR DOCD: CPT | Performed by: INTERNAL MEDICINE

## 2019-11-13 PROCEDURE — G8417 CALC BMI ABV UP PARAM F/U: HCPCS | Performed by: INTERNAL MEDICINE

## 2019-11-13 PROCEDURE — 93000 ELECTROCARDIOGRAM COMPLETE: CPT | Performed by: INTERNAL MEDICINE

## 2019-11-13 PROCEDURE — G8427 DOCREV CUR MEDS BY ELIG CLIN: HCPCS | Performed by: INTERNAL MEDICINE

## 2019-11-13 PROCEDURE — 1036F TOBACCO NON-USER: CPT | Performed by: INTERNAL MEDICINE

## 2019-11-13 PROCEDURE — G8484 FLU IMMUNIZE NO ADMIN: HCPCS | Performed by: INTERNAL MEDICINE

## 2019-11-13 PROCEDURE — 3017F COLORECTAL CA SCREEN DOC REV: CPT | Performed by: INTERNAL MEDICINE

## 2019-11-13 PROCEDURE — G0439 PPPS, SUBSEQ VISIT: HCPCS | Performed by: INTERNAL MEDICINE

## 2019-11-13 RX ORDER — ALLOPURINOL 300 MG/1
300 TABLET ORAL DAILY
Qty: 90 TABLET | Refills: 1 | Status: SHIPPED | OUTPATIENT
Start: 2019-11-13 | End: 2020-04-24 | Stop reason: SDUPTHER

## 2019-11-13 RX ORDER — AMLODIPINE BESYLATE 5 MG/1
5 TABLET ORAL DAILY
Qty: 90 TABLET | Refills: 1 | Status: SHIPPED | OUTPATIENT
Start: 2019-11-13 | End: 2020-04-27 | Stop reason: SDUPTHER

## 2019-11-13 RX ORDER — LISINOPRIL 20 MG/1
20 TABLET ORAL DAILY
Qty: 90 TABLET | Refills: 1 | Status: SHIPPED | OUTPATIENT
Start: 2019-11-13 | End: 2020-04-24 | Stop reason: SDUPTHER

## 2019-11-13 RX ORDER — ASPIRIN 81 MG/1
81 TABLET ORAL DAILY
Qty: 30 TABLET | Refills: 3 | COMMUNITY
Start: 2019-11-13 | End: 2019-11-13 | Stop reason: ALTCHOICE

## 2019-11-13 RX ORDER — SOTALOL HYDROCHLORIDE 80 MG/1
80 TABLET ORAL 2 TIMES DAILY
Qty: 60 TABLET | Refills: 5 | Status: SHIPPED
Start: 2019-11-13 | End: 2020-04-23 | Stop reason: ALTCHOICE

## 2019-11-13 ASSESSMENT — PATIENT HEALTH QUESTIONNAIRE - PHQ9
SUM OF ALL RESPONSES TO PHQ QUESTIONS 1-9: 0
SUM OF ALL RESPONSES TO PHQ QUESTIONS 1-9: 0

## 2019-11-13 ASSESSMENT — LIFESTYLE VARIABLES: HOW OFTEN DO YOU HAVE A DRINK CONTAINING ALCOHOL: 0

## 2019-11-14 ENCOUNTER — TELEPHONE (OUTPATIENT)
Dept: CARDIOLOGY CLINIC | Age: 64
End: 2019-11-14

## 2019-11-27 ENCOUNTER — TELEPHONE (OUTPATIENT)
Dept: CARDIOLOGY CLINIC | Age: 64
End: 2019-11-27

## 2019-11-29 ENCOUNTER — OFFICE VISIT (OUTPATIENT)
Dept: CARDIOLOGY CLINIC | Age: 64
End: 2019-11-29
Payer: MEDICARE

## 2019-11-29 VITALS
HEART RATE: 85 BPM | DIASTOLIC BLOOD PRESSURE: 76 MMHG | OXYGEN SATURATION: 98 % | HEIGHT: 76 IN | SYSTOLIC BLOOD PRESSURE: 132 MMHG | WEIGHT: 264 LBS | BODY MASS INDEX: 32.15 KG/M2

## 2019-11-29 DIAGNOSIS — I48.19 PERSISTENT ATRIAL FIBRILLATION (HCC): Primary | ICD-10-CM

## 2019-11-29 PROCEDURE — G8484 FLU IMMUNIZE NO ADMIN: HCPCS | Performed by: INTERNAL MEDICINE

## 2019-11-29 PROCEDURE — 1123F ACP DISCUSS/DSCN MKR DOCD: CPT | Performed by: INTERNAL MEDICINE

## 2019-11-29 PROCEDURE — 3017F COLORECTAL CA SCREEN DOC REV: CPT | Performed by: INTERNAL MEDICINE

## 2019-11-29 PROCEDURE — G8417 CALC BMI ABV UP PARAM F/U: HCPCS | Performed by: INTERNAL MEDICINE

## 2019-11-29 PROCEDURE — 4040F PNEUMOC VAC/ADMIN/RCVD: CPT | Performed by: INTERNAL MEDICINE

## 2019-11-29 PROCEDURE — 93000 ELECTROCARDIOGRAM COMPLETE: CPT | Performed by: INTERNAL MEDICINE

## 2019-11-29 PROCEDURE — 99212 OFFICE O/P EST SF 10 MIN: CPT | Performed by: INTERNAL MEDICINE

## 2019-11-29 PROCEDURE — G8427 DOCREV CUR MEDS BY ELIG CLIN: HCPCS | Performed by: INTERNAL MEDICINE

## 2019-11-29 PROCEDURE — 1036F TOBACCO NON-USER: CPT | Performed by: INTERNAL MEDICINE

## 2019-11-29 ASSESSMENT — ENCOUNTER SYMPTOMS
EYE PAIN: 0
COUGH: 0
BLOOD IN STOOL: 0
BACK PAIN: 0
DIARRHEA: 0
COLOR CHANGE: 0
VOMITING: 0
CHEST TIGHTNESS: 0
WHEEZING: 0
ABDOMINAL PAIN: 0
CONSTIPATION: 0
NAUSEA: 0
SHORTNESS OF BREATH: 0
PHOTOPHOBIA: 0

## 2019-12-05 ENCOUNTER — TELEPHONE (OUTPATIENT)
Dept: CARDIOLOGY CLINIC | Age: 64
End: 2019-12-05

## 2019-12-06 ENCOUNTER — PROCEDURE VISIT (OUTPATIENT)
Dept: CARDIOLOGY CLINIC | Age: 64
End: 2019-12-06
Payer: MEDICARE

## 2019-12-06 ENCOUNTER — TELEPHONE (OUTPATIENT)
Dept: CARDIOLOGY CLINIC | Age: 64
End: 2019-12-06

## 2019-12-06 ENCOUNTER — OFFICE VISIT (OUTPATIENT)
Dept: CARDIOLOGY CLINIC | Age: 64
End: 2019-12-06

## 2019-12-06 DIAGNOSIS — I48.19 PERSISTENT ATRIAL FIBRILLATION (HCC): Primary | ICD-10-CM

## 2019-12-06 DIAGNOSIS — I48.0 PAF (PAROXYSMAL ATRIAL FIBRILLATION) (HCC): Primary | ICD-10-CM

## 2019-12-06 LAB
LV EF: 58 %
LVEF MODALITY: NORMAL

## 2019-12-06 PROCEDURE — 93306 TTE W/DOPPLER COMPLETE: CPT | Performed by: INTERNAL MEDICINE

## 2019-12-09 ENCOUNTER — TELEPHONE (OUTPATIENT)
Dept: CARDIOLOGY CLINIC | Age: 64
End: 2019-12-09

## 2019-12-11 RX ORDER — ASPIRIN 325 MG
325 TABLET ORAL DAILY
COMMUNITY
End: 2020-04-23 | Stop reason: ALTCHOICE

## 2019-12-19 ENCOUNTER — ANESTHESIA EVENT (OUTPATIENT)
Dept: OPERATING ROOM | Age: 64
End: 2019-12-19
Payer: MEDICARE

## 2019-12-20 ENCOUNTER — ANESTHESIA (OUTPATIENT)
Dept: OPERATING ROOM | Age: 64
End: 2019-12-20
Payer: MEDICARE

## 2019-12-20 ENCOUNTER — HOSPITAL ENCOUNTER (OUTPATIENT)
Age: 64
Setting detail: OUTPATIENT SURGERY
Discharge: HOME | End: 2019-12-20
Attending: SPECIALIST | Admitting: SPECIALIST
Payer: MEDICARE

## 2019-12-20 VITALS
HEART RATE: 77 BPM | TEMPERATURE: 97 F | WEIGHT: 255 LBS | BODY MASS INDEX: 31.05 KG/M2 | DIASTOLIC BLOOD PRESSURE: 68 MMHG | SYSTOLIC BLOOD PRESSURE: 102 MMHG | RESPIRATION RATE: 16 BRPM | OXYGEN SATURATION: 96 % | HEIGHT: 76 IN

## 2019-12-20 VITALS — OXYGEN SATURATION: 96 % | SYSTOLIC BLOOD PRESSURE: 117 MMHG | DIASTOLIC BLOOD PRESSURE: 73 MMHG

## 2019-12-20 PROCEDURE — 7100000010 HC PHASE II RECOVERY - FIRST 15 MIN: Performed by: SPECIALIST

## 2019-12-20 PROCEDURE — 3700000001 HC ADD 15 MINUTES (ANESTHESIA): Performed by: SPECIALIST

## 2019-12-20 PROCEDURE — 3700000000 HC ANESTHESIA ATTENDED CARE: Performed by: SPECIALIST

## 2019-12-20 PROCEDURE — 2500000003 HC RX 250 WO HCPCS: Performed by: NURSE ANESTHETIST, CERTIFIED REGISTERED

## 2019-12-20 PROCEDURE — 6360000002 HC RX W HCPCS: Performed by: NURSE ANESTHETIST, CERTIFIED REGISTERED

## 2019-12-20 PROCEDURE — 7100000011 HC PHASE II RECOVERY - ADDTL 15 MIN: Performed by: SPECIALIST

## 2019-12-20 PROCEDURE — 3609027000 HC COLONOSCOPY: Performed by: SPECIALIST

## 2019-12-20 PROCEDURE — 2709999900 HC NON-CHARGEABLE SUPPLY: Performed by: SPECIALIST

## 2019-12-20 PROCEDURE — 2580000003 HC RX 258: Performed by: SPECIALIST

## 2019-12-20 RX ORDER — LIDOCAINE HYDROCHLORIDE 20 MG/ML
INJECTION, SOLUTION EPIDURAL; INFILTRATION; INTRACAUDAL; PERINEURAL PRN
Status: DISCONTINUED | OUTPATIENT
Start: 2019-12-20 | End: 2019-12-20 | Stop reason: SDUPTHER

## 2019-12-20 RX ORDER — SODIUM CHLORIDE, SODIUM LACTATE, POTASSIUM CHLORIDE, CALCIUM CHLORIDE 600; 310; 30; 20 MG/100ML; MG/100ML; MG/100ML; MG/100ML
INJECTION, SOLUTION INTRAVENOUS CONTINUOUS
Status: DISCONTINUED | OUTPATIENT
Start: 2019-12-20 | End: 2019-12-20 | Stop reason: HOSPADM

## 2019-12-20 RX ORDER — PROPOFOL 10 MG/ML
INJECTION, EMULSION INTRAVENOUS PRN
Status: DISCONTINUED | OUTPATIENT
Start: 2019-12-20 | End: 2019-12-20 | Stop reason: SDUPTHER

## 2019-12-20 RX ADMIN — LIDOCAINE HYDROCHLORIDE 100 MG: 20 INJECTION, SOLUTION EPIDURAL; INFILTRATION; INTRACAUDAL; PERINEURAL at 10:14

## 2019-12-20 RX ADMIN — SODIUM CHLORIDE, POTASSIUM CHLORIDE, SODIUM LACTATE AND CALCIUM CHLORIDE: 600; 310; 30; 20 INJECTION, SOLUTION INTRAVENOUS at 09:06

## 2019-12-20 RX ADMIN — PROPOFOL 230 MG: 10 INJECTION, EMULSION INTRAVENOUS at 10:14

## 2019-12-20 ASSESSMENT — PAIN - FUNCTIONAL ASSESSMENT: PAIN_FUNCTIONAL_ASSESSMENT: 0-10

## 2019-12-20 ASSESSMENT — PAIN SCALES - GENERAL: PAINLEVEL_OUTOF10: 0

## 2019-12-27 ENCOUNTER — TELEPHONE (OUTPATIENT)
Dept: CARDIOLOGY CLINIC | Age: 64
End: 2019-12-27

## 2020-01-09 ENCOUNTER — HOSPITAL ENCOUNTER (OUTPATIENT)
Age: 65
Discharge: HOME | End: 2020-01-09
Payer: MEDICARE

## 2020-01-09 ENCOUNTER — HOSPITAL ENCOUNTER (OUTPATIENT)
Dept: GENERAL RADIOLOGY | Age: 65
Discharge: HOME | End: 2020-01-09
Payer: MEDICARE

## 2020-01-09 LAB
ALBUMIN SERPL-MCNC: 4.2 GM/DL (ref 3.4–5)
ALP BLD-CCNC: 69 IU/L (ref 40–128)
ALT SERPL-CCNC: 20 U/L (ref 10–40)
ANION GAP SERPL CALCULATED.3IONS-SCNC: 15 MMOL/L (ref 4–16)
AST SERPL-CCNC: 23 IU/L (ref 15–37)
BASOPHILS ABSOLUTE: 0.1 K/CU MM
BASOPHILS RELATIVE PERCENT: 1.4 % (ref 0–1)
BILIRUB SERPL-MCNC: 0.6 MG/DL (ref 0–1)
BUN BLDV-MCNC: 17 MG/DL (ref 6–23)
CALCIUM SERPL-MCNC: 9.8 MG/DL (ref 8.3–10.6)
CHLORIDE BLD-SCNC: 99 MMOL/L (ref 99–110)
CO2: 26 MMOL/L (ref 21–32)
CREAT SERPL-MCNC: 1.2 MG/DL (ref 0.9–1.3)
DIFFERENTIAL TYPE: ABNORMAL
EOSINOPHILS ABSOLUTE: 0.3 K/CU MM
EOSINOPHILS RELATIVE PERCENT: 3.9 % (ref 0–3)
GFR AFRICAN AMERICAN: >60 ML/MIN/1.73M2
GFR NON-AFRICAN AMERICAN: >60 ML/MIN/1.73M2
GLUCOSE BLD-MCNC: 114 MG/DL (ref 70–99)
HCT VFR BLD CALC: 45.5 % (ref 42–52)
HEMOGLOBIN: 14.6 GM/DL (ref 13.5–18)
IMMATURE NEUTROPHIL %: 0.3 % (ref 0–0.43)
LYMPHOCYTES ABSOLUTE: 1.8 K/CU MM
LYMPHOCYTES RELATIVE PERCENT: 25.7 % (ref 24–44)
MCH RBC QN AUTO: 30.9 PG (ref 27–31)
MCHC RBC AUTO-ENTMCNC: 32.1 % (ref 32–36)
MCV RBC AUTO: 96.4 FL (ref 78–100)
MONOCYTES ABSOLUTE: 0.6 K/CU MM
MONOCYTES RELATIVE PERCENT: 7.9 % (ref 0–4)
NUCLEATED RBC %: 0 %
PDW BLD-RTO: 12.9 % (ref 11.7–14.9)
PLATELET # BLD: 243 K/CU MM (ref 140–440)
PMV BLD AUTO: 10.2 FL (ref 7.5–11.1)
POTASSIUM SERPL-SCNC: 4.4 MMOL/L (ref 3.5–5.1)
RBC # BLD: 4.72 M/CU MM (ref 4.6–6.2)
SEGMENTED NEUTROPHILS ABSOLUTE COUNT: 4.2 K/CU MM
SEGMENTED NEUTROPHILS RELATIVE PERCENT: 60.8 % (ref 36–66)
SODIUM BLD-SCNC: 140 MMOL/L (ref 135–145)
TOTAL IMMATURE NEUTOROPHIL: 0.02 K/CU MM
TOTAL NUCLEATED RBC: 0 K/CU MM
TOTAL PROTEIN: 7.3 GM/DL (ref 6.4–8.2)
TSH HIGH SENSITIVITY: 1.63 UIU/ML (ref 0.27–4.2)
URIC ACID: 6.9 MG/DL (ref 3.5–7.2)
WBC # BLD: 6.9 K/CU MM (ref 4–10.5)

## 2020-01-09 PROCEDURE — 84550 ASSAY OF BLOOD/URIC ACID: CPT

## 2020-01-09 PROCEDURE — 36415 COLL VENOUS BLD VENIPUNCTURE: CPT

## 2020-01-09 PROCEDURE — 85025 COMPLETE CBC W/AUTO DIFF WBC: CPT

## 2020-01-09 PROCEDURE — 84443 ASSAY THYROID STIM HORMONE: CPT

## 2020-01-09 PROCEDURE — 80053 COMPREHEN METABOLIC PANEL: CPT

## 2020-01-10 ENCOUNTER — HOSPITAL ENCOUNTER (OUTPATIENT)
Age: 65
Discharge: HOME | End: 2020-01-10
Payer: MEDICARE

## 2020-01-10 LAB
CHOLESTEROL, FASTING: 188 MG/DL
HDLC SERPL-MCNC: 38 MG/DL
LDL CHOLESTEROL DIRECT: 129 MG/DL
TRIGLYCERIDE, FASTING: 194 MG/DL

## 2020-01-10 PROCEDURE — 36415 COLL VENOUS BLD VENIPUNCTURE: CPT

## 2020-01-10 PROCEDURE — 80061 LIPID PANEL: CPT

## 2020-01-10 NOTE — RESULT ENCOUNTER NOTE
Call pt, labs ok/chol ok- THYROID FXN AND URIC ACID LEVELS ALSO NL.    HOWEVER, SUGARS REMAIN HIGH AND ACTUALLY INCR --114 INDICATINE POSSIBLE WORSENING PREDIABETES.    VERY IMPORTANT TO CONT WORK W DAILY EXERCISE, TRYING FOR 5-10# WT LOSS AND ALSO MAINTAINING A LOW CARB DIET TO AVOID PROGRESSION TO DM, WHEN SUGARS GO UP  OR HIGHER

## 2020-01-14 ENCOUNTER — TELEPHONE (OUTPATIENT)
Dept: CARDIOLOGY CLINIC | Age: 65
End: 2020-01-14

## 2020-01-17 ENCOUNTER — TELEPHONE (OUTPATIENT)
Dept: CARDIOLOGY CLINIC | Age: 65
End: 2020-01-17

## 2020-01-27 ENCOUNTER — TELEPHONE (OUTPATIENT)
Dept: CARDIOLOGY CLINIC | Age: 65
End: 2020-01-27

## 2020-01-30 ENCOUNTER — TELEPHONE (OUTPATIENT)
Dept: CARDIOLOGY CLINIC | Age: 65
End: 2020-01-30

## 2020-01-31 ENCOUNTER — TELEPHONE (OUTPATIENT)
Dept: CARDIOLOGY CLINIC | Age: 65
End: 2020-01-31

## 2020-01-31 NOTE — TELEPHONE ENCOUNTER
Patient was given a script for Xarelto , when he went to the pharmacy it will be $500 , He can not afford that
Per patient, he does not have Medicare part D. Patient advised to stop in office to sign patient assistance forms for Xarelto. He voiced understanding and states that he will be in Friday for testing and will sign them them. Forms placed at the .
[de-identified] : ROBER MARINO is a 59 year old female who present in the office for follow up visit. Patient s/p exploratory laparotomy, transverse loop diverting colostomy on 01/07/2020. Today patient is doing well denies any complains. No fevers, chills, nausea, vomiting, diarrhea or constipation. Patient able to tolerate regular diet with normal bowel movements via colostomy. Surgical wounds are healing and granulating well. ostomy viable and productive of stool; skin around area is red. No sign of inflammation or exudate.  Patient denies any pain at present time \par

## 2020-01-31 NOTE — TELEPHONE ENCOUNTER
Faxed most recent OV note from Dr. Arlette Roy and Dr. Lala Silva and echo report to [facility] per request for continuity of care (fax # [phone]); patient has appt there on 2/3/2020. Copy of request attached.

## 2020-04-24 RX ORDER — LISINOPRIL 20 MG/1
20 TABLET ORAL DAILY
Qty: 90 TABLET | Refills: 0 | Status: SHIPPED | OUTPATIENT
Start: 2020-04-24 | End: 2020-07-06 | Stop reason: SDUPTHER

## 2020-04-24 RX ORDER — ATORVASTATIN CALCIUM 10 MG/1
10 TABLET, FILM COATED ORAL DAILY
Qty: 90 TABLET | Refills: 0 | Status: SHIPPED | OUTPATIENT
Start: 2020-04-24 | End: 2020-07-06 | Stop reason: SDUPTHER

## 2020-04-24 RX ORDER — TERAZOSIN 1 MG/1
1 CAPSULE ORAL NIGHTLY
Qty: 90 CAPSULE | Refills: 0 | Status: SHIPPED | OUTPATIENT
Start: 2020-04-24 | End: 2020-07-06 | Stop reason: SDUPTHER

## 2020-04-24 RX ORDER — ALLOPURINOL 300 MG/1
300 TABLET ORAL DAILY
Qty: 90 TABLET | Refills: 0 | Status: SHIPPED | OUTPATIENT
Start: 2020-04-24 | End: 2020-07-06 | Stop reason: SDUPTHER

## 2020-04-27 RX ORDER — AMLODIPINE BESYLATE 5 MG/1
5 TABLET ORAL DAILY
Qty: 90 TABLET | Refills: 0 | Status: SHIPPED | OUTPATIENT
Start: 2020-04-27 | End: 2020-07-06 | Stop reason: SDUPTHER

## 2020-07-06 ENCOUNTER — OFFICE VISIT (OUTPATIENT)
Dept: INTERNAL MEDICINE CLINIC | Age: 65
End: 2020-07-06
Payer: MEDICARE

## 2020-07-06 VITALS
WEIGHT: 252 LBS | BODY MASS INDEX: 30.67 KG/M2 | HEART RATE: 73 BPM | DIASTOLIC BLOOD PRESSURE: 76 MMHG | RESPIRATION RATE: 16 BRPM | OXYGEN SATURATION: 97 % | SYSTOLIC BLOOD PRESSURE: 122 MMHG

## 2020-07-06 PROCEDURE — 1036F TOBACCO NON-USER: CPT | Performed by: INTERNAL MEDICINE

## 2020-07-06 PROCEDURE — G8510 SCR DEP NEG, NO PLAN REQD: HCPCS | Performed by: INTERNAL MEDICINE

## 2020-07-06 PROCEDURE — 3017F COLORECTAL CA SCREEN DOC REV: CPT | Performed by: INTERNAL MEDICINE

## 2020-07-06 PROCEDURE — G8427 DOCREV CUR MEDS BY ELIG CLIN: HCPCS | Performed by: INTERNAL MEDICINE

## 2020-07-06 PROCEDURE — 1123F ACP DISCUSS/DSCN MKR DOCD: CPT | Performed by: INTERNAL MEDICINE

## 2020-07-06 PROCEDURE — G8417 CALC BMI ABV UP PARAM F/U: HCPCS | Performed by: INTERNAL MEDICINE

## 2020-07-06 PROCEDURE — 99214 OFFICE O/P EST MOD 30 MIN: CPT | Performed by: INTERNAL MEDICINE

## 2020-07-06 PROCEDURE — 4040F PNEUMOC VAC/ADMIN/RCVD: CPT | Performed by: INTERNAL MEDICINE

## 2020-07-06 RX ORDER — ATORVASTATIN CALCIUM 10 MG/1
10 TABLET, FILM COATED ORAL DAILY
Qty: 90 TABLET | Refills: 1 | Status: SHIPPED | OUTPATIENT
Start: 2020-07-06 | End: 2021-01-06 | Stop reason: SDUPTHER

## 2020-07-06 RX ORDER — ALLOPURINOL 300 MG/1
300 TABLET ORAL DAILY
Qty: 90 TABLET | Refills: 1 | Status: SHIPPED | OUTPATIENT
Start: 2020-07-06 | End: 2021-01-06 | Stop reason: SDUPTHER

## 2020-07-06 RX ORDER — LISINOPRIL 20 MG/1
20 TABLET ORAL DAILY
Qty: 90 TABLET | Refills: 1 | Status: SHIPPED | OUTPATIENT
Start: 2020-07-06 | End: 2021-01-06 | Stop reason: SDUPTHER

## 2020-07-06 RX ORDER — AMLODIPINE BESYLATE 5 MG/1
5 TABLET ORAL DAILY
Qty: 90 TABLET | Refills: 1 | Status: SHIPPED | OUTPATIENT
Start: 2020-07-06 | End: 2021-01-06 | Stop reason: SDUPTHER

## 2020-07-06 RX ORDER — TERAZOSIN 1 MG/1
1 CAPSULE ORAL NIGHTLY
Qty: 90 CAPSULE | Refills: 1 | Status: SHIPPED | OUTPATIENT
Start: 2020-07-06 | End: 2021-01-06 | Stop reason: SDUPTHER

## 2020-07-06 ASSESSMENT — PATIENT HEALTH QUESTIONNAIRE - PHQ9
SUM OF ALL RESPONSES TO PHQ QUESTIONS 1-9: 0
2. FEELING DOWN, DEPRESSED OR HOPELESS: 0
SUM OF ALL RESPONSES TO PHQ QUESTIONS 1-9: 0
1. LITTLE INTEREST OR PLEASURE IN DOING THINGS: 0
SUM OF ALL RESPONSES TO PHQ9 QUESTIONS 1 & 2: 0

## 2020-07-06 NOTE — PROGRESS NOTES
Deena Morfin Sr.  1955 07/06/20    SUBJECTIVE:    Hypertension: Stable. Denies CP, SOB, cough, visual changes, dizziness, palpitations or HA. ATR FIB, states had problems w planning for Jan 2020 cardioversion but apparently miscommunication about getting a prepreocedure CT cardiac score. Now switched to [facility], had successful CVersion in March and now feels well. Remains on xarelto. Seeing Dr Suzanne Prieto EP    Colon polyp, had f/u colonoscopy Dr Jaya Rodriguez in Dec and recheck 2024. Gout, rare mild sx resolve w prn indocin. Lipids:  Is continuing statin therapy and low fat diet. Tolerating medications w/o myalgias or GI upset. BPH- not had PSA done recently either. Urination stable    L index cyst noted which is tender at times. Also w changing skin lesions noted on forearms. OBJECTIVE:    /76   Pulse 73   Resp 16   Wt 252 lb (114.3 kg)   SpO2 97%   BMI 30.67 kg/m²     Physical Exam  Vitals signs reviewed. Constitutional:       General: He is not in acute distress. Appearance: He is well-developed. HENT:      Head: Normocephalic and atraumatic. Nose: Nose normal.      Mouth/Throat:      Mouth: Mucous membranes are moist.      Pharynx: Oropharynx is clear. No oropharyngeal exudate. Eyes:      General: No scleral icterus. Right eye: No discharge. Left eye: No discharge. Conjunctiva/sclera: Conjunctivae normal.      Pupils: Pupils are equal, round, and reactive to light. Neck:      Musculoskeletal: Normal range of motion and neck supple. Thyroid: No thyromegaly. Vascular: No JVD. Trachea: No tracheal deviation. Cardiovascular:      Rate and Rhythm: Normal rate and regular rhythm. Heart sounds: Normal heart sounds. No murmur. No friction rub. No gallop. Pulmonary:      Effort: Pulmonary effort is normal. No respiratory distress. Breath sounds: Normal breath sounds. No wheezing or rales.    Abdominal:      General: Bowel sounds are normal. There is no distension. Palpations: Abdomen is soft. There is no mass. Tenderness: There is no abdominal tenderness. There is no guarding or rebound. Musculoskeletal:         General: Tenderness (l index prox cyst, sl tender.) present. Lymphadenopathy:      Cervical: No cervical adenopathy. Skin:     General: Skin is warm and dry. Findings: No rash. Comments: Small sl dark nodule noted, vesicular, R forearm   Neurological:      Mental Status: He is alert. Psychiatric:         Mood and Affect: Mood normal.         ASSESSMENT:    1. Chronic atrial fibrillation    2. Hypertension    3. BPH (benign prostatic hyperplasia)    4. Essential hypertension    5. Mixed hyperlipidemia    6. Idiopathic gout of foot, unspecified chronicity, unspecified laterality    7. Ganglion cyst of finger of left hand    8. Arm skin lesion, left    9. Skin lesion of right arm        PLAN:    Joann Barba was seen today for hypertension. Diagnoses and all orders for this visit:    Chronic atrial fibrillation- for cont f/u [facility], Pt clinically w/o evidence of cardiovascular instability, cont regimen. On anticoagulation  -     TSH without Reflex; Future    Hypertension - Blood pressure stable and will continue current regimen. Will plan periodic monitoring of renal function, electrolytes, lipid profile. -     Comprehensive Metabolic Panel; Future  -     CBC Auto Differential; Future  -     Lipid Panel; Future  -     terazosin (HYTRIN) 1 MG capsule; Take 1 capsule by mouth nightly    BPH (benign prostatic hyperplasia) - BPH stable on current meds w/o urinary sx of retention, dysuria, nocturia. -     PSA, Prostatic Specific Antigen; Future  -     terazosin (HYTRIN) 1 MG capsule; Take 1 capsule by mouth nightly    Essential hypertension  -     amLODIPine (NORVASC) 5 MG tablet; Take 1 tablet by mouth daily  -     lisinopril (PRINIVIL;ZESTRIL) 20 MG tablet;  Take 1 tablet by mouth daily    Mixed

## 2020-08-14 RX ORDER — INDOMETHACIN 50 MG/1
CAPSULE ORAL
Qty: 90 CAPSULE | Refills: 0 | Status: SHIPPED | OUTPATIENT
Start: 2020-08-14 | End: 2021-04-16 | Stop reason: SDUPTHER

## 2020-11-16 ENCOUNTER — HOSPITAL ENCOUNTER (OUTPATIENT)
Age: 65
Setting detail: SPECIMEN
Discharge: HOME | End: 2020-11-16
Payer: MEDICARE

## 2020-11-16 PROCEDURE — U0002 COVID-19 LAB TEST NON-CDC: HCPCS

## 2020-11-17 LAB
SARS-COV-2: NOT DETECTED
SOURCE: NORMAL

## 2021-01-06 ENCOUNTER — OFFICE VISIT (OUTPATIENT)
Dept: INTERNAL MEDICINE CLINIC | Age: 66
End: 2021-01-06
Payer: MEDICARE

## 2021-01-06 VITALS
DIASTOLIC BLOOD PRESSURE: 74 MMHG | WEIGHT: 241 LBS | BODY MASS INDEX: 29.35 KG/M2 | HEART RATE: 71 BPM | HEIGHT: 76 IN | RESPIRATION RATE: 14 BRPM | SYSTOLIC BLOOD PRESSURE: 122 MMHG | OXYGEN SATURATION: 97 %

## 2021-01-06 DIAGNOSIS — N40.0 BENIGN PROSTATIC HYPERPLASIA WITHOUT LOWER URINARY TRACT SYMPTOMS: ICD-10-CM

## 2021-01-06 DIAGNOSIS — M10.079 IDIOPATHIC GOUT OF FOOT, UNSPECIFIED CHRONICITY, UNSPECIFIED LATERALITY: ICD-10-CM

## 2021-01-06 DIAGNOSIS — E78.2 MIXED HYPERLIPIDEMIA: ICD-10-CM

## 2021-01-06 DIAGNOSIS — I10 ESSENTIAL HYPERTENSION: ICD-10-CM

## 2021-01-06 DIAGNOSIS — Z00.00 ROUTINE GENERAL MEDICAL EXAMINATION AT A HEALTH CARE FACILITY: Primary | ICD-10-CM

## 2021-01-06 DIAGNOSIS — I48.0 PAROXYSMAL ATRIAL FIBRILLATION (HCC): ICD-10-CM

## 2021-01-06 DIAGNOSIS — I48.20 CHRONIC ATRIAL FIBRILLATION (HCC): ICD-10-CM

## 2021-01-06 LAB
A/G RATIO: 2 (ref 1.1–2.2)
ALBUMIN SERPL-MCNC: 4.7 G/DL (ref 3.4–5)
ALP BLD-CCNC: 81 U/L (ref 40–129)
ALT SERPL-CCNC: 21 U/L (ref 10–40)
ANION GAP SERPL CALCULATED.3IONS-SCNC: 10 MMOL/L (ref 3–16)
AST SERPL-CCNC: 22 U/L (ref 15–37)
BASOPHILS ABSOLUTE: 0.1 K/UL (ref 0–0.2)
BASOPHILS RELATIVE PERCENT: 1.2 %
BILIRUB SERPL-MCNC: 0.3 MG/DL (ref 0–1)
BUN BLDV-MCNC: 21 MG/DL (ref 7–20)
CALCIUM SERPL-MCNC: 9.9 MG/DL (ref 8.3–10.6)
CHLORIDE BLD-SCNC: 99 MMOL/L (ref 99–110)
CHOLESTEROL, TOTAL: 181 MG/DL (ref 0–199)
CO2: 27 MMOL/L (ref 21–32)
CREAT SERPL-MCNC: 1.4 MG/DL (ref 0.8–1.3)
EOSINOPHILS ABSOLUTE: 0.1 K/UL (ref 0–0.6)
EOSINOPHILS RELATIVE PERCENT: 2.1 %
GFR AFRICAN AMERICAN: >60
GFR NON-AFRICAN AMERICAN: 50
GLOBULIN: 2.4 G/DL
GLUCOSE BLD-MCNC: 102 MG/DL (ref 70–99)
HCT VFR BLD CALC: 41 % (ref 40.5–52.5)
HDLC SERPL-MCNC: 48 MG/DL (ref 40–60)
HEMOGLOBIN: 13.2 G/DL (ref 13.5–17.5)
LDL CHOLESTEROL CALCULATED: 85 MG/DL
LYMPHOCYTES ABSOLUTE: 1.1 K/UL (ref 1–5.1)
LYMPHOCYTES RELATIVE PERCENT: 18.1 %
MCH RBC QN AUTO: 30.8 PG (ref 26–34)
MCHC RBC AUTO-ENTMCNC: 32.3 G/DL (ref 31–36)
MCV RBC AUTO: 95.4 FL (ref 80–100)
MONOCYTES ABSOLUTE: 0.6 K/UL (ref 0–1.3)
MONOCYTES RELATIVE PERCENT: 9.5 %
NEUTROPHILS ABSOLUTE: 4 K/UL (ref 1.7–7.7)
NEUTROPHILS RELATIVE PERCENT: 69.1 %
PDW BLD-RTO: 14.6 % (ref 12.4–15.4)
PLATELET # BLD: 195 K/UL (ref 135–450)
PMV BLD AUTO: 8.2 FL (ref 5–10.5)
POTASSIUM SERPL-SCNC: 4.7 MMOL/L (ref 3.5–5.1)
PROSTATE SPECIFIC ANTIGEN: 0.71 NG/ML (ref 0–4)
RBC # BLD: 4.29 M/UL (ref 4.2–5.9)
SODIUM BLD-SCNC: 136 MMOL/L (ref 136–145)
TOTAL PROTEIN: 7.1 G/DL (ref 6.4–8.2)
TRIGL SERPL-MCNC: 242 MG/DL (ref 0–150)
TSH SERPL DL<=0.05 MIU/L-ACNC: 1.79 UIU/ML (ref 0.27–4.2)
URIC ACID, SERUM: 5 MG/DL (ref 3.5–7.2)
VLDLC SERPL CALC-MCNC: 48 MG/DL
WBC # BLD: 5.8 K/UL (ref 4–11)

## 2021-01-06 PROCEDURE — G0439 PPPS, SUBSEQ VISIT: HCPCS | Performed by: INTERNAL MEDICINE

## 2021-01-06 PROCEDURE — G8484 FLU IMMUNIZE NO ADMIN: HCPCS | Performed by: INTERNAL MEDICINE

## 2021-01-06 PROCEDURE — 3017F COLORECTAL CA SCREEN DOC REV: CPT | Performed by: INTERNAL MEDICINE

## 2021-01-06 PROCEDURE — 1123F ACP DISCUSS/DSCN MKR DOCD: CPT | Performed by: INTERNAL MEDICINE

## 2021-01-06 PROCEDURE — 36415 COLL VENOUS BLD VENIPUNCTURE: CPT | Performed by: INTERNAL MEDICINE

## 2021-01-06 PROCEDURE — 4040F PNEUMOC VAC/ADMIN/RCVD: CPT | Performed by: INTERNAL MEDICINE

## 2021-01-06 RX ORDER — ATORVASTATIN CALCIUM 10 MG/1
10 TABLET, FILM COATED ORAL DAILY
Qty: 90 TABLET | Refills: 1 | Status: SHIPPED | OUTPATIENT
Start: 2021-01-06 | End: 2021-08-30

## 2021-01-06 RX ORDER — AMLODIPINE BESYLATE 5 MG/1
5 TABLET ORAL DAILY
Qty: 90 TABLET | Refills: 1 | Status: SHIPPED | OUTPATIENT
Start: 2021-01-06 | End: 2021-01-06 | Stop reason: SDUPTHER

## 2021-01-06 RX ORDER — TERAZOSIN 1 MG/1
1 CAPSULE ORAL NIGHTLY
Qty: 90 CAPSULE | Refills: 1 | Status: SHIPPED | OUTPATIENT
Start: 2021-01-06 | End: 2022-07-11 | Stop reason: SDUPTHER

## 2021-01-06 RX ORDER — LISINOPRIL 20 MG/1
20 TABLET ORAL DAILY
Qty: 90 TABLET | Refills: 1 | Status: SHIPPED | OUTPATIENT
Start: 2021-01-06 | End: 2021-08-23

## 2021-01-06 RX ORDER — AMLODIPINE BESYLATE 2.5 MG/1
2.5 TABLET ORAL DAILY
Qty: 90 TABLET | Refills: 1
Start: 2021-01-06 | End: 2021-11-11

## 2021-01-06 RX ORDER — ALLOPURINOL 300 MG/1
300 TABLET ORAL DAILY
Qty: 90 TABLET | Refills: 1 | Status: SHIPPED | OUTPATIENT
Start: 2021-01-06 | End: 2021-06-25 | Stop reason: SDUPTHER

## 2021-01-06 RX ORDER — ZOSTER VACCINE RECOMBINANT, ADJUVANTED 50 MCG/0.5
0.5 KIT INTRAMUSCULAR SEE ADMIN INSTRUCTIONS
Qty: 0.5 ML | Refills: 0 | Status: SHIPPED | OUTPATIENT
Start: 2021-01-06 | End: 2021-07-05

## 2021-01-06 ASSESSMENT — PATIENT HEALTH QUESTIONNAIRE - PHQ9
SUM OF ALL RESPONSES TO PHQ QUESTIONS 1-9: 0
2. FEELING DOWN, DEPRESSED OR HOPELESS: 0
SUM OF ALL RESPONSES TO PHQ9 QUESTIONS 1 & 2: 0

## 2021-01-06 NOTE — PATIENT INSTRUCTIONS
Personalized Preventive Plan for Norma Brumfield Sr. - 1/6/2021  Medicare offers a range of preventive health benefits. Some of the tests and screenings are paid in full while other may be subject to a deductible, co-insurance, and/or copay. Some of these benefits include a comprehensive review of your medical history including lifestyle, illnesses that may run in your family, and various assessments and screenings as appropriate. After reviewing your medical record and screening and assessments performed today your provider may have ordered immunizations, labs, imaging, and/or referrals for you. A list of these orders (if applicable) as well as your Preventive Care list are included within your After Visit Summary for your review. Other Preventive Recommendations:    · A preventive eye exam performed by an eye specialist is recommended every 1-2 years to screen for glaucoma; cataracts, macular degeneration, and other eye disorders. · A preventive dental visit is recommended every 6 months. · Try to get at least 150 minutes of exercise per week or 10,000 steps per day on a pedometer . · Order or download the FREE \"Exercise & Physical Activity: Your Everyday Guide\" from [employer]. Call 8-784.595.5857 or search [employer] online. · You need 1534-2904 mg of calcium and 7667-0409 IU of vitamin D per day. It is possible to meet your calcium requirement with diet alone, but a vitamin D supplement is usually necessary to meet this goal.  · When exposed to the sun, use a sunscreen that protects against both UVA and UVB radiation with an SPF of 30 or greater. Reapply every 2 to 3 hours or after sweating, drying off with a towel, or swimming. · Always wear a seat belt when traveling in a car. Always wear a helmet when riding a bicycle or motorcycle.

## 2021-01-06 NOTE — PROGRESS NOTES
Medical History:   Diagnosis Date    Arthritis     feet, elbows, fingers    BPH (benign prostatic hypertrophy)     Depression     Erectile dysfunction     Family history of diabetes mellitus     mother brothers/sisters    Gout, unspecified     last flare  April-May 2018    H/O echocardiogram 12/06/2019    EF 55-60%, aortic root 3.8 cm, Limited mobility of the posterior mitral valve leaflet without stenosis. Sclerotic, but non-stenotic aortic valve.  History of exercise stress test 05/04/2017    treadmill    Hx of Doppler echocardiogram 08/29/2018    EF55-60%,mildly dilated left atrium    Hyperlipidemia     Hypertension     IFG (impaired fasting glucose)     Impaired fasting glucose     prediabetic    New onset atrial fibrillation ([address] 04/13/2017    ([facility] EP) CHRONICALLY IRREG--- 4/12/17 ECG INITIALLY TREATED LOCALLY, HAD CARDIOVERSION.   NOW 3/20- SEEING [facility]//Seeing Dr Torie Catalan EP    Other testicular hypofunction     Peripheral neuropathy     ?possibly fr etoh- mild intake ~4-6 [place] iced teas/week    Persistent atrial fibrillation ([address] 11/9/2017    Sleep disturbance, unspecified     Tubular adenoma of colon 06/26/2018    Dr Chuck Mcdowell, FOLLOW UP 12/19-- BENIGN, RECHECK 2024       Past Surgical History:   Procedure Laterality Date    COLECTOMY  08/07/2018    COLONOSCOPY  06/26/2018    Grade 2 Internal hemorrhoids, 4cm polyp - surgical consult needed    COLONOSCOPY  12/20/2019    s/p hemicolectomy, divertics, hem, repeat in 5    COLONOSCOPY N/A 12/20/2019    COLORECTAL CANCER SCREENING, HIGH RISK performed by Tristin Muse MD at [place] [address]  11/2016    full dental restoration    EYE SURGERY Bilateral 2013    cataracts    HEMICOLECTOMY  08/10/2018    robotic   [address]  early 6108'K    Umbilical     VASECTOMY  1982       Family History   Problem Relation Age of Onset    Heart Disease Mother     Diabetes Mother     Heart Disease Father  Diabetes Sister     Diabetes Brother     Heart Disease Brother     Heart Surgery Brother         CABG    Diabetes Brother     Diabetes Brother        CareTeam (Including outside providers/suppliers regularly involved in providing care):   Patient Care Team:  Rinku Townsend MD as PCP - Tien Montanez MD as PCP - [facility]  Latoya Robles MD as Consulting Physician (Cardiology)    Wt Readings from Last 3 Encounters:   01/06/21 241 lb (109.3 kg)   07/06/20 252 lb (114.3 kg)   12/20/19 255 lb (115.7 kg)     Vitals:    01/06/21 0804   BP: 122/74   Pulse: 71   Resp: 14   SpO2: 97%   Weight: 241 lb (109.3 kg)   Height: 6' 4\" (1.93 m)     Body mass index is 29.34 kg/m². Based upon direct observation of the patient, evaluation of cognition reveals recent and remote memory intact. General Appearance: alert and oriented to person, place and time, well developed and well- nourished, in no acute distress  Skin: warm and dry, no rash or erythema  Head: normocephalic and atraumatic  Eyes: pupils equal, round, and reactive to light, extraocular eye movements intact, conjunctivae normal  Neck: supple and non-tender without mass, no thyromegaly or thyroid nodules, no cervical lymphadenopathy  Pulmonary/Chest: clear to auscultation bilaterally- no wheezes, rales or rhonchi, normal air movement, no respiratory distress  Cardiovascular: normal rate, regular rhythm, normal S1 and S2, no murmurs, rubs, clicks, or gallops, distal pulses intact, no carotid bruits  Abdomen: soft, non-tender, non-distended, normal bowel sounds, no masses or organomegaly  Extremities: no cyanosis, clubbing or edema  Musculoskeletal: normal range of motion, no joint swelling, deformity or tenderness  Neurologic:   no cranial nerve deficit, gait, coordination and speech normal    Patient's complete Health Risk Assessment and screening values have been reviewed and are found in Flowsheets.  The following problems were reviewed today and where indicated follow up appointments were made and/or referrals ordered. Positive Risk Factor Screenings with Interventions:          General Health and ACP:  General  In general, how would you say your health is?: Very Good  In the past 7 days, have you experienced any of the following?  New or Increased Pain, New or Increased Fatigue, Loneliness, Social Isolation, Stress or Anger?: None of These  Do you get the social and emotional support that you need?: Yes  Do you have a Living Will?: Yes  Advance Directives     Power of [facility] Will ACP-Advance Directive ACP-Power of     Not on File Not on File Not on File Not on File      General Health Risk Interventions:  · NO ISSUES ABOVE     Hearing/Vision:  No exam data present  Hearing/Vision  Do you or your family notice any trouble with your hearing?: No  Do you have difficulty driving, watching TV, or doing any of your daily activities because of your eyesight?: No  Have you had an eye exam within the past year?: (!) No  Hearing/Vision Interventions:  · TO CONSIDER EYE EXAM.      Personalized Preventive Plan   Current Health Maintenance Status  Immunization History   Administered Date(s) Administered    Pneumococcal Conjugate 13-valent (Marco Heather) 05/22/2018        Health Maintenance   Topic Date Due    A1C test (Diabetic or Prediabetic)  04/01/1963    DTaP/Tdap/Td vaccine (1 - Tdap) 04/01/1972    Shingles Vaccine (1 of 2) 04/01/2003    Pneumococcal 65+ years Vaccine (2 of 2 - PPSV23) 05/22/2019    Annual Wellness Visit (AWV)  05/29/2019    Flu vaccine (1) 09/01/2020    Potassium monitoring  01/09/2021    Creatinine monitoring  01/09/2021    Lipid screen  01/10/2021    Colon cancer screen colonoscopy  12/20/2029    Hepatitis C screen  Completed    Hepatitis A vaccine  Aged Out    Hepatitis B vaccine  Aged Out    Hib vaccine  Aged Out    Meningococcal (ACWY) vaccine  Aged Out     Recommendations for Preventive Services Due: see orders and patient instructions/AVS.  . Recommended screening schedule for the next 5-10 years is provided to the patient in written form: see Patient Instructions/AVS.    Susi Benedict was seen today for medicare awv. Diagnoses and all orders for this visit:    Routine general medical examination at a health care facility - remains independent, functional and active, no indications/needs for other interventions noted at this time- except as noted below and also findings noted on screening medicare questions. MY SYMPATHY EXTENDED IN HIS WIFE'S PASSING    Benign prostatic hyperplasia without lower urinary tract symptoms- HE'LL TRY CUTTING BACK ON WATER INTAKE AT BEDTIME, BUT IF SX PERSIST HE'LL RECONSIDER FOR UROLOGY EVAL. F/U PSA  -     terazosin (HYTRIN) 1 MG capsule; Take 1 capsule by mouth nightly  -     PSA, Prostatic Specific Antigen; Future    Essential hypertension  - Blood pressure stable and will continue current regimen. Will plan periodic monitoring of renal function, electrolytes, lipid profile. -     terazosin (HYTRIN) 1 MG capsule; Take 1 capsule by mouth nightly  -     Discontinue: amLODIPine (NORVASC) 5 MG tablet; Take 1 tablet by mouth daily  -     lisinopril (PRINIVIL;ZESTRIL) 20 MG tablet; Take 1 tablet by mouth daily  -     Comprehensive Metabolic Panel; Future  -     CBC Auto Differential; Future  -     Lipid Panel; Future  -     amLODIPine (NORVASC) 2.5 MG tablet; Take 1 tablet by mouth daily    Mixed hyperlipidemia  - Pt will continue to work on a low fat diet and also exercise, wt loss as appropriate. Will continue periodic monitoring of fasting lipid profile, glucose, liver function. -     atorvastatin (LIPITOR) 10 MG tablet; Take 1 tablet by mouth daily  -     Comprehensive Metabolic Panel; Future  -     CBC Auto Differential; Future  -     Lipid Panel; Future  -     TSH without Reflex;  Future    Idiopathic gout of foot, unspecified chronicity, unspecified laterality -  REMAINS STABLE IN REMISSION W CURRENTSUPPRESSIVE THERAPY. WILL MONITOR URIC ACID LEVELS PERIODICALLY. -     allopurinol (ZYLOPRIM) 300 MG tablet; Take 1 tablet by mouth daily    Chronic atrial fibrillation- NOW NSR S/P CARDIOVERSION. -     TSH without Reflex; Future    Other orders  -     zoster recombinant adjuvanted vaccine [facility]) 50 MCG/0.5ML SUSR injection;  Inject 0.5 mLs into the muscle See Admin Instructions 1 dose now and repeat in 2-6 months

## 2021-01-07 NOTE — RESULT ENCOUNTER NOTE
Call pt, labs ok/chol ok- prostate test and thyroid fxn also nl range.   Appears may be just a little dehydrated so do rec regularly drinking 3 glasses of water, one w each meal

## 2021-01-08 DIAGNOSIS — I48.20 CHRONIC ATRIAL FIBRILLATION (HCC): ICD-10-CM

## 2021-01-08 DIAGNOSIS — E78.2 MIXED HYPERLIPIDEMIA: ICD-10-CM

## 2021-01-08 DIAGNOSIS — E78.2 MIXED HYPERLIPIDEMIA: Primary | ICD-10-CM

## 2021-01-08 LAB — T4 FREE: 1.7 NG/DL (ref 0.9–1.8)

## 2021-02-24 ENCOUNTER — OFFICE VISIT (OUTPATIENT)
Dept: INTERNAL MEDICINE CLINIC | Age: 66
End: 2021-02-24
Payer: MEDICARE

## 2021-02-24 VITALS
HEART RATE: 78 BPM | OXYGEN SATURATION: 96 % | WEIGHT: 239 LBS | DIASTOLIC BLOOD PRESSURE: 82 MMHG | SYSTOLIC BLOOD PRESSURE: 134 MMHG | TEMPERATURE: 98.1 F | BODY MASS INDEX: 29.1 KG/M2 | HEIGHT: 76 IN

## 2021-02-24 DIAGNOSIS — L03.116 CELLULITIS OF LEFT FOOT: Primary | ICD-10-CM

## 2021-02-24 PROCEDURE — 99213 OFFICE O/P EST LOW 20 MIN: CPT | Performed by: INTERNAL MEDICINE

## 2021-02-24 PROCEDURE — G8427 DOCREV CUR MEDS BY ELIG CLIN: HCPCS | Performed by: INTERNAL MEDICINE

## 2021-02-24 PROCEDURE — 1123F ACP DISCUSS/DSCN MKR DOCD: CPT | Performed by: INTERNAL MEDICINE

## 2021-02-24 PROCEDURE — G8484 FLU IMMUNIZE NO ADMIN: HCPCS | Performed by: INTERNAL MEDICINE

## 2021-02-24 PROCEDURE — G8417 CALC BMI ABV UP PARAM F/U: HCPCS | Performed by: INTERNAL MEDICINE

## 2021-02-24 PROCEDURE — 4040F PNEUMOC VAC/ADMIN/RCVD: CPT | Performed by: INTERNAL MEDICINE

## 2021-02-24 PROCEDURE — 1036F TOBACCO NON-USER: CPT | Performed by: INTERNAL MEDICINE

## 2021-02-24 PROCEDURE — 3017F COLORECTAL CA SCREEN DOC REV: CPT | Performed by: INTERNAL MEDICINE

## 2021-02-24 RX ORDER — AMOXICILLIN AND CLAVULANATE POTASSIUM 875; 125 MG/1; MG/1
1 TABLET, FILM COATED ORAL 2 TIMES DAILY
Qty: 20 TABLET | Refills: 0 | Status: SHIPPED | OUTPATIENT
Start: 2021-02-24 | End: 2021-03-06

## 2021-02-24 RX ORDER — CLINDAMYCIN HYDROCHLORIDE 300 MG/1
300 CAPSULE ORAL 3 TIMES DAILY
Qty: 30 CAPSULE | Refills: 0 | Status: SHIPPED | OUTPATIENT
Start: 2021-02-24 | End: 2021-03-06

## 2021-02-24 NOTE — PROGRESS NOTES
[place]iana Wilson .  1955 02/24/21    SUBJECTIVE:  3d hx of worsening redness w some drainage noted L great dorsal toe. Spreading erythema to medial calf. No definite insect bite, had low gr fever at home. No trauma or other injury noted. OBJECTIVE:    /82   Pulse 78   Temp 98.1 °F (36.7 °C)   Ht 6' 4\" (1.93 m)   Wt 239 lb (108.4 kg)   SpO2 96%   BMI 29.09 kg/m²     Physical Exam  Constitutional:       Appearance: Normal appearance. Musculoskeletal:         General: Swelling and tenderness present. Feet:    Feet:      Comments: Diffuse erythema w some swelling and tenderness L dorsal great toe, has central trace necrotic tissue w scant discharge. Tr erythema spreading to medial calf. Skin:     Findings: Erythema present. Neurological:      Mental Status: He is alert. ASSESSMENT:    1. Cellulitis of left foot        PLAN:    Tacho Goldsmith was seen today for toe pain. Diagnoses and all orders for this visit:    Cellulitis of left foot - WILL TREAT AGGRESSIVELY W COURSE augmentin and clinca. Hwoever, if no significant improvement next 2-3d asked him to call us back and then may need referral podiatry for possible I&D, debridement. -     amoxicillin-clavulanate (AUGMENTIN) 875-125 MG per tablet; Take 1 tablet by mouth 2 times daily for 10 days  -     clindamycin (CLEOCIN) 300 MG capsule;  Take 1 capsule by mouth 3 times daily for 10 days

## 2021-04-16 DIAGNOSIS — M10.079 IDIOPATHIC GOUT OF FOOT, UNSPECIFIED CHRONICITY, UNSPECIFIED LATERALITY: ICD-10-CM

## 2021-04-16 RX ORDER — INDOMETHACIN 50 MG/1
50 CAPSULE ORAL
Qty: 90 CAPSULE | Refills: 2 | Status: SHIPPED | OUTPATIENT
Start: 2021-04-16 | End: 2021-11-11 | Stop reason: SDUPTHER

## 2021-06-25 DIAGNOSIS — M10.079 IDIOPATHIC GOUT OF FOOT, UNSPECIFIED CHRONICITY, UNSPECIFIED LATERALITY: ICD-10-CM

## 2021-06-25 RX ORDER — ALLOPURINOL 300 MG/1
300 TABLET ORAL DAILY
Qty: 90 TABLET | Refills: 1 | Status: SHIPPED | OUTPATIENT
Start: 2021-06-25 | End: 2021-12-06

## 2021-07-08 ENCOUNTER — OFFICE VISIT (OUTPATIENT)
Dept: INTERNAL MEDICINE CLINIC | Age: 66
End: 2021-07-08
Payer: MEDICARE

## 2021-07-08 VITALS
RESPIRATION RATE: 14 BRPM | DIASTOLIC BLOOD PRESSURE: 74 MMHG | SYSTOLIC BLOOD PRESSURE: 120 MMHG | WEIGHT: 230 LBS | HEART RATE: 68 BPM | OXYGEN SATURATION: 97 % | BODY MASS INDEX: 28 KG/M2

## 2021-07-08 DIAGNOSIS — N52.9 ERECTILE DYSFUNCTION, UNSPECIFIED ERECTILE DYSFUNCTION TYPE: Primary | ICD-10-CM

## 2021-07-08 DIAGNOSIS — N52.9 ERECTILE DYSFUNCTION, UNSPECIFIED ERECTILE DYSFUNCTION TYPE: ICD-10-CM

## 2021-07-08 DIAGNOSIS — I10 ESSENTIAL HYPERTENSION: ICD-10-CM

## 2021-07-08 DIAGNOSIS — E78.2 MIXED HYPERLIPIDEMIA: ICD-10-CM

## 2021-07-08 DIAGNOSIS — I48.20 CHRONIC ATRIAL FIBRILLATION (HCC): ICD-10-CM

## 2021-07-08 DIAGNOSIS — I48.0 PAROXYSMAL ATRIAL FIBRILLATION (HCC): Primary | ICD-10-CM

## 2021-07-08 LAB
A/G RATIO: 1.5 (ref 1.1–2.2)
ALBUMIN SERPL-MCNC: 4.3 G/DL (ref 3.4–5)
ALP BLD-CCNC: 78 U/L (ref 40–129)
ALT SERPL-CCNC: 15 U/L (ref 10–40)
ANION GAP SERPL CALCULATED.3IONS-SCNC: 10 MMOL/L (ref 3–16)
AST SERPL-CCNC: 19 U/L (ref 15–37)
BASOPHILS ABSOLUTE: 0.1 K/UL (ref 0–0.2)
BASOPHILS RELATIVE PERCENT: 1.4 %
BILIRUB SERPL-MCNC: 0.7 MG/DL (ref 0–1)
BUN BLDV-MCNC: 17 MG/DL (ref 7–20)
CALCIUM SERPL-MCNC: 9.3 MG/DL (ref 8.3–10.6)
CHLORIDE BLD-SCNC: 101 MMOL/L (ref 99–110)
CHOLESTEROL, TOTAL: 187 MG/DL (ref 0–199)
CO2: 28 MMOL/L (ref 21–32)
CREAT SERPL-MCNC: 1 MG/DL (ref 0.8–1.3)
EOSINOPHILS ABSOLUTE: 0.1 K/UL (ref 0–0.6)
EOSINOPHILS RELATIVE PERCENT: 2.8 %
GFR AFRICAN AMERICAN: >60
GFR NON-AFRICAN AMERICAN: >60
GLOBULIN: 2.8 G/DL
GLUCOSE BLD-MCNC: 98 MG/DL (ref 70–99)
HCT VFR BLD CALC: 39.9 % (ref 40.5–52.5)
HDLC SERPL-MCNC: 55 MG/DL (ref 40–60)
HEMOGLOBIN: 13.6 G/DL (ref 13.5–17.5)
LDL CHOLESTEROL CALCULATED: 116 MG/DL
LYMPHOCYTES ABSOLUTE: 1 K/UL (ref 1–5.1)
LYMPHOCYTES RELATIVE PERCENT: 20.9 %
MCH RBC QN AUTO: 32.5 PG (ref 26–34)
MCHC RBC AUTO-ENTMCNC: 34.2 G/DL (ref 31–36)
MCV RBC AUTO: 94.9 FL (ref 80–100)
MONOCYTES ABSOLUTE: 0.5 K/UL (ref 0–1.3)
MONOCYTES RELATIVE PERCENT: 11.4 %
NEUTROPHILS ABSOLUTE: 3 K/UL (ref 1.7–7.7)
NEUTROPHILS RELATIVE PERCENT: 63.5 %
PDW BLD-RTO: 14.1 % (ref 12.4–15.4)
PLATELET # BLD: 194 K/UL (ref 135–450)
PMV BLD AUTO: 7.9 FL (ref 5–10.5)
POTASSIUM SERPL-SCNC: 4.7 MMOL/L (ref 3.5–5.1)
RBC # BLD: 4.2 M/UL (ref 4.2–5.9)
SODIUM BLD-SCNC: 139 MMOL/L (ref 136–145)
TOTAL PROTEIN: 7.1 G/DL (ref 6.4–8.2)
TRIGL SERPL-MCNC: 82 MG/DL (ref 0–150)
VLDLC SERPL CALC-MCNC: 16 MG/DL
WBC # BLD: 4.7 K/UL (ref 4–11)

## 2021-07-08 PROCEDURE — 1036F TOBACCO NON-USER: CPT | Performed by: INTERNAL MEDICINE

## 2021-07-08 PROCEDURE — G8427 DOCREV CUR MEDS BY ELIG CLIN: HCPCS | Performed by: INTERNAL MEDICINE

## 2021-07-08 PROCEDURE — 3017F COLORECTAL CA SCREEN DOC REV: CPT | Performed by: INTERNAL MEDICINE

## 2021-07-08 PROCEDURE — G8417 CALC BMI ABV UP PARAM F/U: HCPCS | Performed by: INTERNAL MEDICINE

## 2021-07-08 PROCEDURE — 4040F PNEUMOC VAC/ADMIN/RCVD: CPT | Performed by: INTERNAL MEDICINE

## 2021-07-08 PROCEDURE — 99214 OFFICE O/P EST MOD 30 MIN: CPT | Performed by: INTERNAL MEDICINE

## 2021-07-08 PROCEDURE — 1123F ACP DISCUSS/DSCN MKR DOCD: CPT | Performed by: INTERNAL MEDICINE

## 2021-07-08 PROCEDURE — 36415 COLL VENOUS BLD VENIPUNCTURE: CPT | Performed by: INTERNAL MEDICINE

## 2021-07-08 RX ORDER — SILDENAFIL CITRATE 20 MG/1
20 TABLET ORAL DAILY PRN
Qty: 30 TABLET | Refills: 5 | Status: SHIPPED | OUTPATIENT
Start: 2021-07-08

## 2021-07-08 NOTE — PROGRESS NOTES
Raffi Broussard .  1955 07/08/21    SUBJECTIVE:  Hx of afib and prior CVersion. Denies palpitations and stable on meds, incl anticoagulation    Tubular adenoma, 2018 and f/u 2019, next due 2024. Hypertension: Stable. Denies CP, SOB, cough, visual changes, dizziness, palpitations or HA. HE WANTED TO TRY TO WEAN OFF MEDS. HE'LL MONITOR AND TRY TO HOLD/WEAN NORVASC, BUT NEEDS TO KEEP SBP <140. Lipids:  Is continuing statin therapy and low fat diet. Tolerating medications w/o myalgias or GI upset. FURTHER WT LOSS IS NOTED    RECOVERING WELL SINCE WIFE HAS PASSED. HAS MENTIONED SOME ISSUES W ED.  WE DISCUSSED SCR TESTOSTERONE, TRIAL OF SILDENAFIL, ALSO SCR GLC R/O DM    OBJECTIVE:    /74   Pulse 68   Resp 14   Wt 230 lb (104.3 kg)   SpO2 97%   BMI 28.00 kg/m²     Physical Exam  Vitals reviewed. Constitutional:       General: He is not in acute distress. Appearance: He is well-developed. HENT:      Head: Normocephalic and atraumatic. Eyes:      General: No scleral icterus. Right eye: No discharge. Left eye: No discharge. Conjunctiva/sclera: Conjunctivae normal.      Pupils: Pupils are equal, round, and reactive to light. Neck:      Thyroid: No thyromegaly. Vascular: No JVD. Trachea: No tracheal deviation. Cardiovascular:      Rate and Rhythm: Normal rate and regular rhythm. Heart sounds: Normal heart sounds. No murmur heard. No friction rub. No gallop. Pulmonary:      Effort: Pulmonary effort is normal. No respiratory distress. Breath sounds: Normal breath sounds. No wheezing or rales. Abdominal:      General: Bowel sounds are normal. There is no distension. Palpations: Abdomen is soft. There is no mass. Tenderness: There is no abdominal tenderness. There is no guarding or rebound. Musculoskeletal:         General: No tenderness. Cervical back: Normal range of motion and neck supple.    Lymphadenopathy: Cervical: No cervical adenopathy. Skin:     General: Skin is warm and dry. Neurological:      Mental Status: He is alert. Psychiatric:         Mood and Affect: Mood normal.         ASSESSMENT:    1. Paroxysmal atrial fibrillation (HCC)    2. Essential hypertension    3. Mixed hyperlipidemia    4. Erectile dysfunction, unspecified erectile dysfunction type        PLAN:    Saadia Ruby was seen today for hypertension. Diagnoses and all orders for this visit:    Paroxysmal atrial fibrillation ([facility] [address] - Pt clinically w/o evidence of cardiovascular instability, cont regimen. -     Comprehensive Metabolic Panel; Future  -     CBC Auto Differential; Future  -     Lipid Panel; Future    Essential hypertension  - Blood pressure stable and will continue current regimen. Will plan periodic monitoring of renal function, electrolytes, lipid profile. -     Comprehensive Metabolic Panel; Future  -     CBC Auto Differential; Future  -     Lipid Panel; Future    Mixed hyperlipidemia  - Pt will continue to work on a low fat diet and also exercise, wt loss as appropriate. Will continue periodic monitoring of fasting lipid profile, glucose, liver function. -     Comprehensive Metabolic Panel; Future  -     CBC Auto Differential; Future  -     Lipid Panel; Future    Erectile dysfunction, unspecified erectile dysfunction type - TRIAL MEDS, CHECK TESTOST  -     sildenafil (REVATIO) 20 MG tablet;  Take 1 tablet by mouth daily as needed (erectile dysfunction)  -     Cancel: Testosterone, free, total; Future

## 2021-07-08 NOTE — PATIENT INSTRUCTIONS
CONTINUE TO WATCH BP, CAN TRY TO HOLD AMLODIPINE/NORVASC, BUT IF SYSTOLIC BP RISES TO >826, PLEASE RESTART.

## 2021-07-09 NOTE — RESULT ENCOUNTER NOTE
Call pt, labs ok/chol ok, kidney fxn and hydration levels are improved from last time so pls continue w current fluid intake

## 2021-07-10 LAB
SEX HORMONE BINDING GLOBULIN: 42 NMOL/L (ref 11–80)
TESTOSTERONE FREE-NONMALE: 67.1 PG/ML (ref 47–244)
TESTOSTERONE TOTAL: 386 NG/DL (ref 220–1000)

## 2021-08-22 DIAGNOSIS — I10 ESSENTIAL HYPERTENSION: ICD-10-CM

## 2021-08-23 RX ORDER — AMLODIPINE BESYLATE 5 MG/1
5 TABLET ORAL DAILY
Qty: 90 TABLET | Refills: 3 | Status: SHIPPED | OUTPATIENT
Start: 2021-08-23 | End: 2022-08-03 | Stop reason: SDUPTHER

## 2021-08-23 RX ORDER — LISINOPRIL 20 MG/1
20 TABLET ORAL DAILY
Qty: 90 TABLET | Refills: 3 | Status: SHIPPED | OUTPATIENT
Start: 2021-08-23 | End: 2022-08-03 | Stop reason: SDUPTHER

## 2021-08-29 DIAGNOSIS — E78.2 MIXED HYPERLIPIDEMIA: ICD-10-CM

## 2021-08-30 RX ORDER — ATORVASTATIN CALCIUM 10 MG/1
10 TABLET, FILM COATED ORAL DAILY
Qty: 90 TABLET | Refills: 3 | Status: SHIPPED | OUTPATIENT
Start: 2021-08-30 | End: 2022-08-03 | Stop reason: SDUPTHER

## 2021-11-11 ENCOUNTER — VIRTUAL VISIT (OUTPATIENT)
Dept: INTERNAL MEDICINE CLINIC | Age: 66
End: 2021-11-11
Payer: MEDICARE

## 2021-11-11 DIAGNOSIS — I48.20 CHRONIC ATRIAL FIBRILLATION (HCC): ICD-10-CM

## 2021-11-11 DIAGNOSIS — M10.079 IDIOPATHIC GOUT OF FOOT, UNSPECIFIED CHRONICITY, UNSPECIFIED LATERALITY: ICD-10-CM

## 2021-11-11 DIAGNOSIS — R68.89 FLU-LIKE SYMPTOMS: Primary | ICD-10-CM

## 2021-11-11 PROCEDURE — 3017F COLORECTAL CA SCREEN DOC REV: CPT | Performed by: INTERNAL MEDICINE

## 2021-11-11 PROCEDURE — 99213 OFFICE O/P EST LOW 20 MIN: CPT | Performed by: INTERNAL MEDICINE

## 2021-11-11 PROCEDURE — 1036F TOBACCO NON-USER: CPT | Performed by: INTERNAL MEDICINE

## 2021-11-11 PROCEDURE — 4040F PNEUMOC VAC/ADMIN/RCVD: CPT | Performed by: INTERNAL MEDICINE

## 2021-11-11 PROCEDURE — G8417 CALC BMI ABV UP PARAM F/U: HCPCS | Performed by: INTERNAL MEDICINE

## 2021-11-11 PROCEDURE — G8427 DOCREV CUR MEDS BY ELIG CLIN: HCPCS | Performed by: INTERNAL MEDICINE

## 2021-11-11 PROCEDURE — G8484 FLU IMMUNIZE NO ADMIN: HCPCS | Performed by: INTERNAL MEDICINE

## 2021-11-11 PROCEDURE — 1123F ACP DISCUSS/DSCN MKR DOCD: CPT | Performed by: INTERNAL MEDICINE

## 2021-11-11 RX ORDER — PREDNISONE 1 MG/1
TABLET ORAL
Qty: 21 TABLET | Refills: 0 | Status: SHIPPED | OUTPATIENT
Start: 2021-11-11 | End: 2022-01-10

## 2021-11-11 RX ORDER — DOXYCYCLINE HYCLATE 100 MG
100 TABLET ORAL 2 TIMES DAILY
Qty: 20 TABLET | Refills: 0 | Status: SHIPPED | OUTPATIENT
Start: 2021-11-11 | End: 2021-11-21

## 2021-11-11 RX ORDER — INDOMETHACIN 50 MG/1
50 CAPSULE ORAL
Qty: 270 CAPSULE | Refills: 1 | Status: SHIPPED | OUTPATIENT
Start: 2021-11-11

## 2021-11-11 RX ORDER — BENZONATATE 100 MG/1
100 CAPSULE ORAL 3 TIMES DAILY PRN
Qty: 30 CAPSULE | Refills: 0 | Status: SHIPPED | OUTPATIENT
Start: 2021-11-11 | End: 2021-11-21

## 2021-11-11 NOTE — PROGRESS NOTES
rate-    Respiratory rate-    Temperature-  Pulse oximetry-     Constitutional: [] Appears well-developed and well-nourished [] No apparent distress      [] Abnormal-   Mental status  [] Alert and awake  [] Oriented to person/place/time []Able to follow commands      Eyes:  EOM    []  Normal  [] Abnormal-  Sclera  []  Normal  [] Abnormal -         Discharge []  None visible  [] Abnormal -    HENT:   [] Normocephalic, atraumatic. [] Abnormal   [] Mouth/Throat: Mucous membranes are moist.     External Ears [] Normal  [] Abnormal-     Neck: [] No visualized mass     Pulmonary/Chest: [] Respiratory effort normal.  [] No visualized signs of difficulty breathing or respiratory distress        [] Abnormal-      Musculoskeletal:   [] Normal gait with no signs of ataxia         [] Normal range of motion of neck        [] Abnormal-       Neurological:        [] No Facial Asymmetry (Cranial nerve 7 motor function) (limited exam to video visit)          [] No gaze palsy        [] Abnormal-         Skin:        [] No significant exanthematous lesions or discoloration noted on facial skin         [] Abnormal-            Psychiatric:       [] Normal Affect [] No Hallucinations        [] Abnormal-     Other pertinent observable physical exam findings-     ASSESSMENT/PLAN:  1. Flu-like symptoms  Need to r/o COVID espec since unvaccinated. Otherwise empiric rx below and To call back one week if not improving.      - predniSONE (DELTASONE) 5 MG tablet; Take 6 tablets by mouth on day 1, 5 on day 2, 4 on day 3, 3 on day 4, 2 on day 5, 1 on day 6. Dispense: 21 tablet; Refill: 0  - doxycycline hyclate (VIBRA-TABS) 100 MG tablet; Take 1 tablet by mouth 2 times daily for 10 days  Dispense: 20 tablet; Refill: 0  - benzonatate (TESSALON) 100 MG capsule; Take 1 capsule by mouth 3 times daily as needed for Cough  Dispense: 30 capsule; Refill: 0    2.  Idiopathic gout of foot, unspecified chronicity, unspecified laterality  Cont rx prn  -

## 2021-11-12 LAB
COMMENT: ABNORMAL
SARS-COV-2 RNA, RT PCR: DETECTED
SOURCE: ABNORMAL

## 2022-01-10 ENCOUNTER — OFFICE VISIT (OUTPATIENT)
Dept: INTERNAL MEDICINE CLINIC | Age: 67
End: 2022-01-10
Payer: MEDICARE

## 2022-01-10 VITALS
DIASTOLIC BLOOD PRESSURE: 72 MMHG | WEIGHT: 234 LBS | HEIGHT: 76 IN | OXYGEN SATURATION: 96 % | BODY MASS INDEX: 28.49 KG/M2 | RESPIRATION RATE: 14 BRPM | HEART RATE: 74 BPM | SYSTOLIC BLOOD PRESSURE: 124 MMHG

## 2022-01-10 DIAGNOSIS — Z12.12 SCREENING FOR COLORECTAL CANCER: ICD-10-CM

## 2022-01-10 DIAGNOSIS — Z00.00 ROUTINE GENERAL MEDICAL EXAMINATION AT A HEALTH CARE FACILITY: Primary | ICD-10-CM

## 2022-01-10 DIAGNOSIS — Z12.11 SCREENING FOR COLORECTAL CANCER: ICD-10-CM

## 2022-01-10 DIAGNOSIS — E78.2 MIXED HYPERLIPIDEMIA: ICD-10-CM

## 2022-01-10 DIAGNOSIS — I10 PRIMARY HYPERTENSION: ICD-10-CM

## 2022-01-10 DIAGNOSIS — I48.20 CHRONIC ATRIAL FIBRILLATION (HCC): ICD-10-CM

## 2022-01-10 DIAGNOSIS — M10.079 IDIOPATHIC GOUT OF FOOT, UNSPECIFIED CHRONICITY, UNSPECIFIED LATERALITY: ICD-10-CM

## 2022-01-10 PROCEDURE — 1036F TOBACCO NON-USER: CPT | Performed by: INTERNAL MEDICINE

## 2022-01-10 PROCEDURE — 4040F PNEUMOC VAC/ADMIN/RCVD: CPT | Performed by: INTERNAL MEDICINE

## 2022-01-10 PROCEDURE — 99214 OFFICE O/P EST MOD 30 MIN: CPT | Performed by: INTERNAL MEDICINE

## 2022-01-10 PROCEDURE — G8417 CALC BMI ABV UP PARAM F/U: HCPCS | Performed by: INTERNAL MEDICINE

## 2022-01-10 PROCEDURE — 3017F COLORECTAL CA SCREEN DOC REV: CPT | Performed by: INTERNAL MEDICINE

## 2022-01-10 PROCEDURE — G8484 FLU IMMUNIZE NO ADMIN: HCPCS | Performed by: INTERNAL MEDICINE

## 2022-01-10 PROCEDURE — 1123F ACP DISCUSS/DSCN MKR DOCD: CPT | Performed by: INTERNAL MEDICINE

## 2022-01-10 PROCEDURE — G8427 DOCREV CUR MEDS BY ELIG CLIN: HCPCS | Performed by: INTERNAL MEDICINE

## 2022-01-10 PROCEDURE — G0439 PPPS, SUBSEQ VISIT: HCPCS | Performed by: INTERNAL MEDICINE

## 2022-01-10 ASSESSMENT — PATIENT HEALTH QUESTIONNAIRE - PHQ9
1. LITTLE INTEREST OR PLEASURE IN DOING THINGS: 0
SUM OF ALL RESPONSES TO PHQ9 QUESTIONS 1 & 2: 0
SUM OF ALL RESPONSES TO PHQ QUESTIONS 1-9: 0
2. FEELING DOWN, DEPRESSED OR HOPELESS: 0
SUM OF ALL RESPONSES TO PHQ QUESTIONS 1-9: 0

## 2022-01-10 ASSESSMENT — LIFESTYLE VARIABLES
HOW OFTEN DO YOU HAVE A DRINK CONTAINING ALCOHOL: 0
HOW OFTEN DO YOU HAVE A DRINK CONTAINING ALCOHOL: NEVER
AUDIT-C TOTAL SCORE: INCOMPLETE
AUDIT TOTAL SCORE: INCOMPLETE

## 2022-01-10 NOTE — PROGRESS NOTES
Medicare Annual Wellness Visit  Name: Mari Deng SJSPKH Date: 1/10/2022   MRN: [id number] Sex: Male   Age: 74 y.o. Ethnicity: Non- / Non    : 1955 Race: White (non-)      Amber Bermeo Sr. is here for Medicare AWV    Screenings for behavioral, psychosocial and functional/safety risks, and cognitive dysfunction are all negative except as indicated below. These results, as well as other patient data from the [address] form, are documented in Flowsheets linked to this Encounter. 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 atr fib, on anticoagulation and denies cp or sob, bp stable, no palpitations. Has MONITOR AT HOME AND MAINLY STAYING NSR. Does cont f/u w Dr Kaycee Glass  Is on amiodarone too and has f/u w LFTs PFTs and stress test periodically. Gout stable on allopurinol     Lipids:  Is continuing statin therapy and low fat diet. Tolerating medications w/o myalgias or GI upset. Defers covid vaccine. Did have covid infection last Nov but recovered well. Had colonoscopy last  and recheck due  w hx of tubular adenoma. BUT has had some dark stools noted, is on MVI, he'll hold this for a week then also check stool cards. No Known Allergies    Prior to Visit Medications    Medication Sig Taking?  Authorizing Provider   allopurinol (ZYLOPRIM) 300 MG tablet TAKE 1 TABLET BY MOUTH  DAILY Yes Susi Rojas MD   rivaroxaban (XARELTO) 20 MG TABS tablet Take 1 tablet by mouth daily (with breakfast) Yes Susi Rojas MD   indomethacin (INDOCIN) 50 MG capsule Take 1 capsule by mouth 3 times daily (with meals) Yes Susi Rojas MD   atorvastatin (LIPITOR) 10 MG tablet TAKE 1 TABLET BY MOUTH  DAILY Yes Susi Rojas MD   lisinopril (PRINIVIL;ZESTRIL) 20 MG tablet TAKE 1 TABLET BY MOUTH  DAILY Yes Susi Rojas MD   amLODIPine (NORVASC) 5 MG tablet TAKE 1 TABLET BY MOUTH  DAILY Yes Susi Rojas MD   sildenafil (REVATIO) 20 MG tablet Take 1 tablet by mouth daily as needed (erectile dysfunction) Yes Christianne Telles MD   terazosin (HYTRIN) 1 MG capsule Take 1 capsule by mouth nightly Yes Christianne Telles MD   MULTIPLE VITAMIN PO Take by mouth daily Yes Historical Provider, MD       Past Medical History:   Diagnosis Date    Arthritis     feet, elbows, fingers    BPH (benign prostatic hypertrophy)     Depression     Erectile dysfunction     Family history of diabetes mellitus     mother brothers/sisters    Gout, unspecified     last flare  April-May 2018   Garcia H/O echocardiogram 12/06/2019    EF 55-60%, aortic root 3.8 cm, Limited mobility of the posterior mitral valve leaflet without stenosis. Sclerotic, but non-stenotic aortic valve.  History of exercise stress test 05/04/2017    treadmill    Hx of Doppler echocardiogram 08/29/2018    EF55-60%,mildly dilated left atrium    Hyperlipidemia     Hypertension     IFG (impaired fasting glucose)     Impaired fasting glucose     prediabetic    New onset atrial fibrillation ([address] 04/13/2017    ([facility]- [facility] EP) NOW NSR S/P CARDIOVERSION--- 4/12/17 ECG INITIALLY TREATED LOCALLY, HAD CARDIOVERSION.   NOW 3/20- SEEING [facility]//Seeing Dr Rosemary Sexton EP    Other testicular hypofunction     Peripheral neuropathy     ?possibly fr etoh- mild intake ~4-6 [place] iced teas/week    Sleep disturbance, unspecified     Tubular adenoma of colon 06/26/2018    Dr Orville Bob, FOLLOW UP 12/19-- BENIGN, RECHECK 2024       Past Surgical History:   Procedure Laterality Date    COLECTOMY  08/07/2018    COLONOSCOPY  06/26/2018    Grade 2 Internal hemorrhoids, 4cm polyp - surgical consult needed    COLONOSCOPY  12/20/2019    s/p hemicolectomy, divertics, hem, repeat in 5    COLONOSCOPY N/A 12/20/2019    COLORECTAL CANCER SCREENING, HIGH RISK performed by Jai Garcia MD at [place] [address]  11/2016    full dental restoration    EYE SURGERY Bilateral 2013 cataracts    HEMICOLECTOMY  08/10/2018    robotic    HERNIA REPAIR  early 9451'M    Umbilical     VASECTOMY  1982       Family History   Problem Relation Age of Onset    Heart Disease Mother     Diabetes Mother     Heart Disease Father     Diabetes Sister     Diabetes Brother     Heart Disease Brother     Heart Surgery Brother         CABG    Diabetes Brother     Diabetes Brother        CareTeam (Including outside providers/suppliers regularly involved in providing care):   Patient Care Team:  Chase Arias MD as PCP - Kimmy Becker MD as PCP - Nimesh Fraga Dr Empjuanled Provider  Madhuri Lewis MD as Consulting Physician (Cardiology)    Wt Readings from Last 3 Encounters:   01/10/22 234 lb (106.1 kg)   07/08/21 230 lb (104.3 kg)   02/24/21 239 lb (108.4 kg)     Vitals:    01/10/22 0802   BP: 124/72   Pulse: 74   Resp: 14   SpO2: 96%   Weight: 234 lb (106.1 kg)   Height: 6' 4\" (1.93 m)     Body mass index is 28.48 kg/m². Based upon direct observation of the patient, evaluation of cognition reveals recent and remote memory intact.     General Appearance: alert and oriented to person, place and time, well developed and well- nourished, in no acute distress  Skin: warm and dry, no rash or erythema  Head: normocephalic and atraumatic  Eyes: pupils equal, round, and reactive to light, extraocular eye movements intact, conjunctivae normal  Neck: supple and non-tender without mass, no thyromegaly or thyroid nodules, no cervical lymphadenopathy  Pulmonary/Chest: clear to auscultation bilaterally- no wheezes, rales or rhonchi, normal air movement, no respiratory distress  Cardiovascular: normal rate, regular rhythm, normal S1 and S2, no murmurs, rubs, clicks, or gallops, distal pulses intact, no carotid bruits  Abdomen: soft, non-tender, non-distended, normal bowel sounds, no masses or organomegaly  Extremities: no cyanosis, clubbing or edema  Musculoskeletal: normal range of motion, no joint swelling, deformity or tenderness  Neurologic:   no cranial nerve deficit, gait, coordination and speech normal    Patient's complete Health Risk Assessment and screening values have been reviewed and are found in Flowsheets. The following problems were reviewed today and where indicated follow up appointments were made and/or referrals ordered. Positive Risk Factor Screenings with Interventions:            General Health and ACP:  General  In general, how would you say your health is?: Very Good  In the past 7 days, have you experienced any of the following? New or Increased Pain, New or Increased Fatigue, Loneliness, Social Isolation, Stress or Anger?: None of These  Do you get the social and emotional support that you need?: Yes  Do you have a Living Will?: Yes  Advance Directives     Power [facility] Will ACP-Advance Directive ACP-Power of     Not on File Not on File Not on File Not on File      General Health Risk Interventions:  · no issues above   · HE MENTIONED HIS MEMORY AT TIMES CAN LAPSE, ASKED HIM TRY A FLORIN TEST. Health Habits/Nutrition:  Health Habits/Nutrition  Do you exercise for at least 20 minutes 2-3 times per week?: (!) No  Have you lost any weight without trying in the past 3 months?: No  Do you eat only one meal per day?: No  Have you seen the dentist within the past year?: Yes  Body mass index: (!) 28.48  Health Habits/Nutrition Interventions:  · did encourage more regular exercise and also diet. has had some wt gain 5# over the holidays.            Personalized Preventive Plan   Current Health Maintenance Status  Immunization History   Administered Date(s) Administered    Pneumococcal Conjugate 13-valent (Deja Zhao) 05/22/2018    Zoster Recombinant (Shingrix) 01/08/2021, 07/01/2021        Health Maintenance   Topic Date Due    COVID-19 Vaccine (1) Never done    A1C test (Diabetic or Prediabetic)  Never done    Depression Screen  Never done    DTaP/Tdap/Td vaccine (1 - Tdap) Never done    Pneumococcal 65+ years Vaccine (2 of 2 - PPSV23) 2019    Flu vaccine (1) Never done    Annual Wellness Visit (AWV)  2022    Lipid screen  2022    Potassium monitoring  2022    Creatinine monitoring  2022    Colon cancer screen colonoscopy  2029    Shingles Vaccine  Completed    Hepatitis C screen  Completed    Hepatitis A vaccine  Aged Out    Hepatitis B vaccine  Aged Out    Hib vaccine  Aged Out    Meningococcal (ACWY) vaccine  Aged Out     Recommendations for [employer] Due: see orders and patient instructions/AVS.  . Recommended screening schedule for the next 5-10 years is provided to the patient in written form: see Patient Instructions/AVS.    Randy Escobar was seen today for medicare awv. Diagnoses and all orders for this visit:    Routine general medical examination at a health care facility - remains independent, functional and active, no indications/needs for other interventions noted at this time- except as noted below and also findings noted on screening medicare questions. **DID ENCOURAGE REGULAR DAILY EXERCISE TOO**    Chronic atrial fibrillation (HCC) - Pt clinically w/o evidence of cardiovascular instability, cont regimen. For cont F/U [facility] CARDIOLOGY, ALSO THEY ARE MONITORING HIM ON AMIODARONE THERAPY  -     Comprehensive Metabolic Panel; Future  -     CBC Auto Differential; Future  -     Lipid Panel; Future  -     TSH without Reflex; Future  -     T4, Free; Future    Primary hypertension - Blood pressure stable and will continue current regimen. Will plan periodic monitoring of renal function, electrolytes, lipid profile. -     Comprehensive Metabolic Panel; Future  -     CBC Auto Differential; Future  -     Lipid Panel; Future    Mixed hyperlipidemia  - Pt will continue to work on a low fat diet and also exercise, wt loss as appropriate.   Will continue periodic monitoring of fasting lipid profile, glucose, liver function. -     Comprehensive Metabolic Panel; Future  -     CBC Auto Differential; Future  -     Lipid Panel; Future  -     TSH without Reflex; Future  -     T4, Free; Future    Idiopathic gout of foot, unspecified chronicity, unspecified laterality -  REMAINS STABLE IN REMISSION W CURRENTSUPPRESSIVE THERAPY. WILL MONITOR URIC ACID LEVELS PERIODICALLY. -     Uric Acid;  Future    Screening for colorectal cancer  -     Blood Occult Stool Screen #3; Future

## 2022-01-10 NOTE — PATIENT INSTRUCTIONS
To screen for dementia, please consider the free online test at [facility] website called the FLORIN test    To help w cardiovascular health, maintaining heart rhythm and also losing wt- very important to work on aerobic exercise up to 30min/day. Once you get in a habit doing for 14d straight it will be much easier to maintain this. Personalized Preventive Plan for Kandi Jara Sr. - 1/10/2022  Medicare offers a range of preventive health benefits. Some of the tests and screenings are paid in full while other may be subject to a deductible, co-insurance, and/or copay. Some of these benefits include a comprehensive review of your medical history including lifestyle, illnesses that may run in your family, and various assessments and screenings as appropriate. After reviewing your medical record and screening and assessments performed today your provider may have ordered immunizations, labs, imaging, and/or referrals for you. A list of these orders (if applicable) as well as your Preventive Care list are included within your After Visit Summary for your review. Other Preventive Recommendations:    · A preventive eye exam performed by an eye specialist is recommended every 1-2 years to screen for glaucoma; cataracts, macular degeneration, and other eye disorders. · A preventive dental visit is recommended every 6 months. · Try to get at least 150 minutes of exercise per week or 10,000 steps per day on a pedometer . · Order or download the FREE \"Exercise & Physical Activity: Your Everyday Guide\" from [employer]. Call 0-488.695.5705 or search [employer] online. · You need 7689-0161 mg of calcium and 4564-4962 IU of vitamin D per day.  It is possible to meet your calcium requirement with diet alone, but a vitamin D supplement is usually necessary to meet this goal.  · When exposed to the sun, use a sunscreen that protects against both UVA and UVB radiation with an SPF of 30 or greater. Reapply every 2 to 3 hours or after sweating, drying off with a towel, or swimming. · Always wear a seat belt when traveling in a car. Always wear a helmet when riding a bicycle or motorcycle.

## 2022-04-27 DIAGNOSIS — L98.9 ARM SKIN LESION, RIGHT: Primary | ICD-10-CM

## 2022-05-19 DIAGNOSIS — M10.079 IDIOPATHIC GOUT OF FOOT, UNSPECIFIED CHRONICITY, UNSPECIFIED LATERALITY: ICD-10-CM

## 2022-05-20 RX ORDER — ALLOPURINOL 300 MG/1
300 TABLET ORAL DAILY
Qty: 90 TABLET | Refills: 0 | Status: SHIPPED | OUTPATIENT
Start: 2022-05-20 | End: 2022-08-15

## 2022-07-11 ENCOUNTER — OFFICE VISIT (OUTPATIENT)
Dept: INTERNAL MEDICINE CLINIC | Age: 67
End: 2022-07-11
Payer: MEDICARE

## 2022-07-11 VITALS
RESPIRATION RATE: 14 BRPM | DIASTOLIC BLOOD PRESSURE: 88 MMHG | WEIGHT: 221 LBS | OXYGEN SATURATION: 97 % | HEART RATE: 57 BPM | SYSTOLIC BLOOD PRESSURE: 140 MMHG | BODY MASS INDEX: 26.9 KG/M2

## 2022-07-11 DIAGNOSIS — R68.82 DECREASED LIBIDO: ICD-10-CM

## 2022-07-11 DIAGNOSIS — N40.0 BENIGN PROSTATIC HYPERPLASIA WITHOUT LOWER URINARY TRACT SYMPTOMS: ICD-10-CM

## 2022-07-11 DIAGNOSIS — E78.2 MIXED HYPERLIPIDEMIA: ICD-10-CM

## 2022-07-11 DIAGNOSIS — I10 ESSENTIAL HYPERTENSION: Primary | ICD-10-CM

## 2022-07-11 DIAGNOSIS — I48.0 PAROXYSMAL ATRIAL FIBRILLATION (HCC): ICD-10-CM

## 2022-07-11 DIAGNOSIS — I48.20 CHRONIC ATRIAL FIBRILLATION (HCC): ICD-10-CM

## 2022-07-11 DIAGNOSIS — I10 ESSENTIAL HYPERTENSION: ICD-10-CM

## 2022-07-11 DIAGNOSIS — M10.079 IDIOPATHIC GOUT OF FOOT, UNSPECIFIED CHRONICITY, UNSPECIFIED LATERALITY: ICD-10-CM

## 2022-07-11 LAB
A/G RATIO: 1.6 (ref 1.1–2.2)
ALBUMIN SERPL-MCNC: 4.5 G/DL (ref 3.4–5)
ALP BLD-CCNC: 70 U/L (ref 40–129)
ALT SERPL-CCNC: 19 U/L (ref 10–40)
ANION GAP SERPL CALCULATED.3IONS-SCNC: 15 MMOL/L (ref 3–16)
AST SERPL-CCNC: 22 U/L (ref 15–37)
BASOPHILS ABSOLUTE: 0.1 K/UL (ref 0–0.2)
BASOPHILS RELATIVE PERCENT: 1 %
BILIRUB SERPL-MCNC: 0.6 MG/DL (ref 0–1)
BUN BLDV-MCNC: 21 MG/DL (ref 7–20)
CALCIUM SERPL-MCNC: 9.9 MG/DL (ref 8.3–10.6)
CHLORIDE BLD-SCNC: 100 MMOL/L (ref 99–110)
CHOLESTEROL, TOTAL: 235 MG/DL (ref 0–199)
CO2: 23 MMOL/L (ref 21–32)
CREAT SERPL-MCNC: 1.1 MG/DL (ref 0.8–1.3)
EOSINOPHILS ABSOLUTE: 0.1 K/UL (ref 0–0.6)
EOSINOPHILS RELATIVE PERCENT: 1.3 %
GFR AFRICAN AMERICAN: >60
GFR NON-AFRICAN AMERICAN: >60
GLUCOSE BLD-MCNC: 102 MG/DL (ref 70–99)
HCT VFR BLD CALC: 41.8 % (ref 40.5–52.5)
HDLC SERPL-MCNC: 53 MG/DL (ref 40–60)
HEMOGLOBIN: 14.2 G/DL (ref 13.5–17.5)
LDL CHOLESTEROL CALCULATED: 156 MG/DL
LYMPHOCYTES ABSOLUTE: 0.9 K/UL (ref 1–5.1)
LYMPHOCYTES RELATIVE PERCENT: 16.1 %
MCH RBC QN AUTO: 32.9 PG (ref 26–34)
MCHC RBC AUTO-ENTMCNC: 33.9 G/DL (ref 31–36)
MCV RBC AUTO: 96.9 FL (ref 80–100)
MONOCYTES ABSOLUTE: 0.5 K/UL (ref 0–1.3)
MONOCYTES RELATIVE PERCENT: 8.8 %
NEUTROPHILS ABSOLUTE: 4.1 K/UL (ref 1.7–7.7)
NEUTROPHILS RELATIVE PERCENT: 72.8 %
PDW BLD-RTO: 14 % (ref 12.4–15.4)
PLATELET # BLD: 238 K/UL (ref 135–450)
PMV BLD AUTO: 7.8 FL (ref 5–10.5)
POTASSIUM SERPL-SCNC: 4.5 MMOL/L (ref 3.5–5.1)
PROSTATE SPECIFIC ANTIGEN: 1.22 NG/ML (ref 0–4)
RBC # BLD: 4.32 M/UL (ref 4.2–5.9)
SODIUM BLD-SCNC: 138 MMOL/L (ref 136–145)
TOTAL PROTEIN: 7.4 G/DL (ref 6.4–8.2)
TRIGL SERPL-MCNC: 130 MG/DL (ref 0–150)
TSH SERPL DL<=0.05 MIU/L-ACNC: 1.41 UIU/ML (ref 0.27–4.2)
URIC ACID, SERUM: 5.8 MG/DL (ref 3.5–7.2)
VLDLC SERPL CALC-MCNC: 26 MG/DL
WBC # BLD: 5.6 K/UL (ref 4–11)

## 2022-07-11 PROCEDURE — G8417 CALC BMI ABV UP PARAM F/U: HCPCS | Performed by: INTERNAL MEDICINE

## 2022-07-11 PROCEDURE — G8427 DOCREV CUR MEDS BY ELIG CLIN: HCPCS | Performed by: INTERNAL MEDICINE

## 2022-07-11 PROCEDURE — 1123F ACP DISCUSS/DSCN MKR DOCD: CPT | Performed by: INTERNAL MEDICINE

## 2022-07-11 PROCEDURE — 99214 OFFICE O/P EST MOD 30 MIN: CPT | Performed by: INTERNAL MEDICINE

## 2022-07-11 PROCEDURE — 3017F COLORECTAL CA SCREEN DOC REV: CPT | Performed by: INTERNAL MEDICINE

## 2022-07-11 PROCEDURE — 36415 COLL VENOUS BLD VENIPUNCTURE: CPT | Performed by: INTERNAL MEDICINE

## 2022-07-11 PROCEDURE — 1036F TOBACCO NON-USER: CPT | Performed by: INTERNAL MEDICINE

## 2022-07-11 RX ORDER — TERAZOSIN 1 MG/1
1 CAPSULE ORAL NIGHTLY
Qty: 90 CAPSULE | Refills: 3 | Status: SHIPPED | OUTPATIENT
Start: 2022-07-11

## 2022-07-11 NOTE — PROGRESS NOTES
Judi Cooper Sr.  1955 07/11/22    SUBJECTIVE:    Chr afib, denies palpitations, on xarelto and cont f/u w cardiology at [facility], Dr Lisa Golden    Hypertension: Stable- did not take meds earlier this am and ran out of hytrin. Denies CP, SOB, cough, visual changes, dizziness, palpitations or HA. Gout no recent flares. BPH- sl slowed stream noted, is on hytrin but ran out. Due for PSA. We discussed also to estab w urology at [facility]. Three weeks ago noticed a R groin lump, swollen up to an egg size but then spont resolved after 3d. No tenderness or ing hernia noted on exam but sl swelling? He'll also check w urology    Has had sl decr libido sporadically and req for testosterone check      OBJECTIVE:    BP (!) 140/88   Pulse 57   Resp 14   Wt 221 lb (100.2 kg)   SpO2 97%   BMI 26.90 kg/m²     Physical Exam  Vitals reviewed. Constitutional:       General: He is not in acute distress. Appearance: He is well-developed. HENT:      Head: Normocephalic and atraumatic. Eyes:      General: No scleral icterus. Right eye: No discharge. Left eye: No discharge. Conjunctiva/sclera: Conjunctivae normal.      Pupils: Pupils are equal, round, and reactive to light. Neck:      Thyroid: No thyromegaly. Vascular: No JVD. Trachea: No tracheal deviation. Cardiovascular:      Rate and Rhythm: Normal rate and regular rhythm. Heart sounds: Normal heart sounds. No murmur heard. No friction rub. No gallop. Pulmonary:      Effort: Pulmonary effort is normal. No respiratory distress. Breath sounds: Normal breath sounds. No wheezing or rales. Abdominal:      General: Bowel sounds are normal. There is no distension. Palpations: Abdomen is soft. There is no mass. Tenderness: There is no abdominal tenderness. There is no guarding or rebound. Genitourinary:         Comments: nontender sl swelling, no inguinal hernia noted on R.     Musculoskeletal: Cervical back: Normal range of motion and neck supple. Lymphadenopathy:      Cervical: No cervical adenopathy. Skin:     General: Skin is warm and dry. Neurological:      Mental Status: He is alert. Psychiatric:         Mood and Affect: Mood normal.         ASSESSMENT:    1. Essential hypertension    2. Chronic atrial fibrillation (HCC)    3. Mixed hyperlipidemia    4. Benign prostatic hyperplasia without lower urinary tract symptoms    5. Idiopathic gout of foot, unspecified chronicity, unspecified laterality    6. Decreased libido        PLAN:    Cortez Bojorquez was seen today for hypertension, mass and benign prostatic hypertrophy. Diagnoses and all orders for this visit:    Essential hypertension- Blood pressure stable and will continue current regimen. Will plan periodic monitoring of renal function, electrolytes, lipid profile. -     terazosin (HYTRIN) 1 MG capsule; Take 1 capsule by mouth nightly  -     Comprehensive Metabolic Panel; Future  -     CBC with Auto Differential; Future  -     Lipid Panel; Future    Paroxysmal atrial fibrillation ([address]- states now steady in NSR since f[facility]u w [facility], cont regimen and f/u TSH  -     TSH; Future    Mixed hyperlipidemia- Pt will continue to work on a low fat diet and also exercise, wt loss as appropriate. Will continue periodic monitoring of fasting lipid profile, glucose, liver function. -     Comprehensive Metabolic Panel; Future  -     CBC with Auto Differential; Future  -     Lipid Panel; Future    Benign prostatic hyperplasia without lower urinary tract symptoms- w some incr sx refer eval urology [facility], check psa, rf hytrin  -     terazosin (HYTRIN) 1 MG capsule; Take 1 capsule by mouth nightly  -     External Referral To Urology  -     PSA, Prostatic Specific Antigen; Future    Idiopathic gout of foot, unspecified chronicity, unspecified laterality -  REMAINS STABLE IN REMISSION W CURRENTSUPPRESSIVE THERAPY. WILL MONITOR URIC ACID LEVELS PERIODICALLY.     - Uric Acid;  Future    Decreased libido- scr levels  -     Testosterone, free, total; Future

## 2022-07-12 NOTE — RESULT ENCOUNTER NOTE
Call pt, labs ok incl psa, thyroid fxn and his uric acid level for gout. However, chol is significantly higher from 187--235 so do rec more aggressive work w a low fat diet, aiming for a 5# wt loss too if possible and regular exercise to try to get chol back down to <200.

## 2022-07-13 LAB
SEX HORMONE BINDING GLOBULIN: 60 NMOL/L (ref 11–80)
TESTOSTERONE FREE-NONMALE: 74.7 PG/ML (ref 47–244)
TESTOSTERONE TOTAL: 531 NG/DL (ref 220–1000)

## 2022-08-02 DIAGNOSIS — I10 ESSENTIAL HYPERTENSION: ICD-10-CM

## 2022-08-03 DIAGNOSIS — E78.2 MIXED HYPERLIPIDEMIA: ICD-10-CM

## 2022-08-03 DIAGNOSIS — I10 ESSENTIAL HYPERTENSION: ICD-10-CM

## 2022-08-03 RX ORDER — LISINOPRIL 20 MG/1
20 TABLET ORAL DAILY
Qty: 90 TABLET | Refills: 3 | Status: SHIPPED | OUTPATIENT
Start: 2022-08-03

## 2022-08-03 RX ORDER — AMLODIPINE BESYLATE 5 MG/1
5 TABLET ORAL DAILY
Qty: 90 TABLET | Refills: 3 | Status: SHIPPED | OUTPATIENT
Start: 2022-08-03 | End: 2022-09-21

## 2022-08-03 RX ORDER — ATORVASTATIN CALCIUM 10 MG/1
10 TABLET, FILM COATED ORAL DAILY
Qty: 90 TABLET | Refills: 3 | Status: SHIPPED | OUTPATIENT
Start: 2022-08-03 | End: 2022-08-05 | Stop reason: SDUPTHER

## 2022-08-05 DIAGNOSIS — E78.2 MIXED HYPERLIPIDEMIA: ICD-10-CM

## 2022-08-05 RX ORDER — ATORVASTATIN CALCIUM 10 MG/1
10 TABLET, FILM COATED ORAL DAILY
Qty: 90 TABLET | Refills: 3 | Status: SHIPPED | OUTPATIENT
Start: 2022-08-05

## 2022-08-12 DIAGNOSIS — M10.079 IDIOPATHIC GOUT OF FOOT, UNSPECIFIED CHRONICITY, UNSPECIFIED LATERALITY: ICD-10-CM

## 2022-08-15 RX ORDER — ALLOPURINOL 300 MG/1
300 TABLET ORAL DAILY
Qty: 90 TABLET | Refills: 1 | Status: SHIPPED | OUTPATIENT
Start: 2022-08-15

## 2022-08-24 ENCOUNTER — OFFICE VISIT (OUTPATIENT)
Dept: INTERNAL MEDICINE CLINIC | Age: 67
End: 2022-08-24
Payer: MEDICARE

## 2022-08-24 VITALS
WEIGHT: 220 LBS | HEART RATE: 70 BPM | DIASTOLIC BLOOD PRESSURE: 80 MMHG | SYSTOLIC BLOOD PRESSURE: 120 MMHG | BODY MASS INDEX: 26.78 KG/M2 | RESPIRATION RATE: 14 BRPM

## 2022-08-24 DIAGNOSIS — L03.031 CELLULITIS OF RIGHT TOE: ICD-10-CM

## 2022-08-24 DIAGNOSIS — M86.271 SUBACUTE OSTEOMYELITIS OF RIGHT FOOT (HCC): Primary | ICD-10-CM

## 2022-08-24 DIAGNOSIS — M86.271 SUBACUTE OSTEOMYELITIS OF RIGHT FOOT (HCC): ICD-10-CM

## 2022-08-24 LAB
BASOPHILS ABSOLUTE: 0.1 K/UL (ref 0–0.2)
BASOPHILS RELATIVE PERCENT: 1.3 %
C-REACTIVE PROTEIN: 3.7 MG/L (ref 0–5.1)
EOSINOPHILS ABSOLUTE: 0.1 K/UL (ref 0–0.6)
EOSINOPHILS RELATIVE PERCENT: 1.4 %
HCT VFR BLD CALC: 41.1 % (ref 40.5–52.5)
HEMOGLOBIN: 13.7 G/DL (ref 13.5–17.5)
LYMPHOCYTES ABSOLUTE: 1.1 K/UL (ref 1–5.1)
LYMPHOCYTES RELATIVE PERCENT: 19.4 %
MCH RBC QN AUTO: 32.3 PG (ref 26–34)
MCHC RBC AUTO-ENTMCNC: 33.3 G/DL (ref 31–36)
MCV RBC AUTO: 96.9 FL (ref 80–100)
MONOCYTES ABSOLUTE: 0.5 K/UL (ref 0–1.3)
MONOCYTES RELATIVE PERCENT: 8.7 %
NEUTROPHILS ABSOLUTE: 3.9 K/UL (ref 1.7–7.7)
NEUTROPHILS RELATIVE PERCENT: 69.2 %
PDW BLD-RTO: 14.1 % (ref 12.4–15.4)
PLATELET # BLD: 230 K/UL (ref 135–450)
PMV BLD AUTO: 7.4 FL (ref 5–10.5)
RBC # BLD: 4.24 M/UL (ref 4.2–5.9)
WBC # BLD: 5.7 K/UL (ref 4–11)

## 2022-08-24 PROCEDURE — 1123F ACP DISCUSS/DSCN MKR DOCD: CPT | Performed by: INTERNAL MEDICINE

## 2022-08-24 PROCEDURE — G8427 DOCREV CUR MEDS BY ELIG CLIN: HCPCS | Performed by: INTERNAL MEDICINE

## 2022-08-24 PROCEDURE — 1036F TOBACCO NON-USER: CPT | Performed by: INTERNAL MEDICINE

## 2022-08-24 PROCEDURE — 36415 COLL VENOUS BLD VENIPUNCTURE: CPT | Performed by: INTERNAL MEDICINE

## 2022-08-24 PROCEDURE — 99214 OFFICE O/P EST MOD 30 MIN: CPT | Performed by: INTERNAL MEDICINE

## 2022-08-24 PROCEDURE — 3017F COLORECTAL CA SCREEN DOC REV: CPT | Performed by: INTERNAL MEDICINE

## 2022-08-24 PROCEDURE — G8417 CALC BMI ABV UP PARAM F/U: HCPCS | Performed by: INTERNAL MEDICINE

## 2022-08-24 RX ORDER — AMOXICILLIN AND CLAVULANATE POTASSIUM 875; 125 MG/1; MG/1
1 TABLET, FILM COATED ORAL 2 TIMES DAILY
Qty: 28 TABLET | Refills: 0 | Status: SHIPPED | OUTPATIENT
Start: 2022-08-24 | End: 2022-09-21 | Stop reason: SDUPTHER

## 2022-08-24 RX ORDER — SULFAMETHOXAZOLE AND TRIMETHOPRIM 800; 160 MG/1; MG/1
1 TABLET ORAL 2 TIMES DAILY
Qty: 28 TABLET | Refills: 0 | Status: SHIPPED | OUTPATIENT
Start: 2022-08-24 | End: 2022-09-07

## 2022-08-24 SDOH — ECONOMIC STABILITY: FOOD INSECURITY: WITHIN THE PAST 12 MONTHS, THE FOOD YOU BOUGHT JUST DIDN'T LAST AND YOU DIDN'T HAVE MONEY TO GET MORE.: NEVER TRUE

## 2022-08-24 SDOH — ECONOMIC STABILITY: FOOD INSECURITY: WITHIN THE PAST 12 MONTHS, YOU WORRIED THAT YOUR FOOD WOULD RUN OUT BEFORE YOU GOT MONEY TO BUY MORE.: NEVER TRUE

## 2022-08-24 ASSESSMENT — SOCIAL DETERMINANTS OF HEALTH (SDOH): HOW HARD IS IT FOR YOU TO PAY FOR THE VERY BASICS LIKE FOOD, HOUSING, MEDICAL CARE, AND HEATING?: NOT HARD AT ALL

## 2022-08-24 NOTE — RESULT ENCOUNTER NOTE
Call pt, his blood counts and inflammation level are normal.  We'll await results of MRI and also reassess his foot/toe next week

## 2022-09-21 ENCOUNTER — OFFICE VISIT (OUTPATIENT)
Dept: INTERNAL MEDICINE CLINIC | Age: 67
End: 2022-09-21
Payer: MEDICARE

## 2022-09-21 VITALS
RESPIRATION RATE: 16 BRPM | SYSTOLIC BLOOD PRESSURE: 130 MMHG | DIASTOLIC BLOOD PRESSURE: 68 MMHG | HEART RATE: 67 BPM | OXYGEN SATURATION: 97 %

## 2022-09-21 DIAGNOSIS — L03.031 CELLULITIS OF RIGHT TOE: ICD-10-CM

## 2022-09-21 DIAGNOSIS — I10 ESSENTIAL HYPERTENSION: ICD-10-CM

## 2022-09-21 DIAGNOSIS — M86.271 SUBACUTE OSTEOMYELITIS OF RIGHT FOOT (HCC): Primary | ICD-10-CM

## 2022-09-21 DIAGNOSIS — M86.271 SUBACUTE OSTEOMYELITIS OF RIGHT FOOT (HCC): ICD-10-CM

## 2022-09-21 PROCEDURE — 1036F TOBACCO NON-USER: CPT | Performed by: INTERNAL MEDICINE

## 2022-09-21 PROCEDURE — 99214 OFFICE O/P EST MOD 30 MIN: CPT | Performed by: INTERNAL MEDICINE

## 2022-09-21 PROCEDURE — G8427 DOCREV CUR MEDS BY ELIG CLIN: HCPCS | Performed by: INTERNAL MEDICINE

## 2022-09-21 PROCEDURE — 1123F ACP DISCUSS/DSCN MKR DOCD: CPT | Performed by: INTERNAL MEDICINE

## 2022-09-21 PROCEDURE — 3017F COLORECTAL CA SCREEN DOC REV: CPT | Performed by: INTERNAL MEDICINE

## 2022-09-21 PROCEDURE — G8417 CALC BMI ABV UP PARAM F/U: HCPCS | Performed by: INTERNAL MEDICINE

## 2022-09-21 RX ORDER — AMOXICILLIN AND CLAVULANATE POTASSIUM 875; 125 MG/1; MG/1
1 TABLET, FILM COATED ORAL 2 TIMES DAILY
Qty: 28 TABLET | Refills: 0 | Status: SHIPPED | OUTPATIENT
Start: 2022-09-21 | End: 2022-10-05

## 2022-09-21 NOTE — PATIENT INSTRUCTIONS
For leg swelling rec we stop amlodipine. Continue to check BP as this may rise off medication.   If later rises to >788 systolic, then call us back so we'll incr terasozin from 1-2mg daily, you can also start this earlier taking 2 of the 1mg tabs

## 2022-09-21 NOTE — PROGRESS NOTES
Hoy Goltz.  1955 09/21/22    SUBJECTIVE:  he notices some incr swelling in legs later in the day, is on norvasc. Bp at home running ~130s/80s. R toe cellulitis, improved after abx but we never got the rept of his MRI at [facility]. Still w callus. WE CALLED FOR REPT AND THIS INDICATES PROB OSTEOMYELITIS. OBJECTIVE:    /68   Pulse 67   Resp 16   SpO2 97%     Physical Exam  Constitutional:       Appearance: Normal appearance. Cardiovascular:      Rate and Rhythm: Normal rate and regular rhythm. Heart sounds: No murmur heard. No gallop. Pulmonary:      Effort: No respiratory distress. Breath sounds: No wheezing. Abdominal:      General: Abdomen is flat. Bowel sounds are normal. There is no distension. Palpations: Abdomen is soft. There is no mass. Tenderness: There is no abdominal tenderness. Hernia: No hernia is present. Musculoskeletal:         General: Swelling (MILD PERSISTENT SWELLING/ erythema of R 3rd toe, has significant callus formation.) present. Right lower leg: No edema. Left lower leg: No edema. Neurological:      Mental Status: He is alert. Psychiatric:         Mood and Affect: Mood normal.       ASSESSMENT:    1. Subacute osteomyelitis of right foot ([address]    2. Cellulitis of right toe    3. Essential hypertension        PLAN:    Matthew Head was seen today for leg swelling. Diagnoses and all orders for this visit:    Subacute osteomyelitis of right foot ([address]- SETTING UP FOR F/U PODIATRY ASAP, WE'LL ADD ONE MORE SCRIPT FOR AUGMENTIN. WARNED PT IS AT RISK FOR POSSIBLE AMPUTATION. -     amoxicillin-clavulanate (AUGMENTIN) 875-125 MG per tablet; Take 1 tablet by mouth 2 times daily for 14 days    Cellulitis of right toe- PODIATRY EVAL AS NOTED,   -     Amb External Referral To Podiatry  -     amoxicillin-clavulanate (AUGMENTIN) 875-125 MG per tablet;  Take 1 tablet by mouth 2 times daily for 14 days    Essential hypertension- STOPPING NORVASC WHICH IS LIKELY CAUSING SWELLING, HE'LL MONITOR BP AND IF SYSTOLIC >043 THEN TO CALL US BACK AND WE'LL INCR HYTRIN FROM 1--2MG DAILY NEXT STEP. Patient Instructions   For leg swelling rec we stop amlodipine. Continue to check BP as this may rise off medication.   If later rises to >809 systolic, then call us back so we'll incr terasozin from 1-2mg daily, you can also start this earlier taking 2 of the 1mg tabs

## 2023-01-16 ENCOUNTER — OFFICE VISIT (OUTPATIENT)
Dept: INTERNAL MEDICINE CLINIC | Age: 68
End: 2023-01-16

## 2023-01-16 VITALS
RESPIRATION RATE: 14 BRPM | BODY MASS INDEX: 27.52 KG/M2 | HEART RATE: 109 BPM | WEIGHT: 226 LBS | OXYGEN SATURATION: 96 % | HEIGHT: 76 IN | DIASTOLIC BLOOD PRESSURE: 74 MMHG | SYSTOLIC BLOOD PRESSURE: 124 MMHG

## 2023-01-16 DIAGNOSIS — R07.89 LEFT-SIDED CHEST WALL PAIN: ICD-10-CM

## 2023-01-16 DIAGNOSIS — N40.0 BENIGN PROSTATIC HYPERPLASIA WITHOUT LOWER URINARY TRACT SYMPTOMS: ICD-10-CM

## 2023-01-16 DIAGNOSIS — E78.2 MIXED HYPERLIPIDEMIA: ICD-10-CM

## 2023-01-16 DIAGNOSIS — M10.079 IDIOPATHIC GOUT OF FOOT, UNSPECIFIED CHRONICITY, UNSPECIFIED LATERALITY: ICD-10-CM

## 2023-01-16 DIAGNOSIS — I48.0 PAROXYSMAL ATRIAL FIBRILLATION (HCC): ICD-10-CM

## 2023-01-16 DIAGNOSIS — Z00.00 ROUTINE GENERAL MEDICAL EXAMINATION AT A HEALTH CARE FACILITY: Primary | ICD-10-CM

## 2023-01-16 DIAGNOSIS — L98.9 ARM SKIN LESION, RIGHT: ICD-10-CM

## 2023-01-16 DIAGNOSIS — R73.01 IMPAIRED FASTING GLUCOSE: ICD-10-CM

## 2023-01-16 DIAGNOSIS — I10 PRIMARY HYPERTENSION: ICD-10-CM

## 2023-01-16 DIAGNOSIS — Z00.00 MEDICARE ANNUAL WELLNESS VISIT, SUBSEQUENT: ICD-10-CM

## 2023-01-16 ASSESSMENT — PATIENT HEALTH QUESTIONNAIRE - PHQ9
2. FEELING DOWN, DEPRESSED OR HOPELESS: 0
1. LITTLE INTEREST OR PLEASURE IN DOING THINGS: 0
SUM OF ALL RESPONSES TO PHQ QUESTIONS 1-9: 0
SUM OF ALL RESPONSES TO PHQ9 QUESTIONS 1 & 2: 0

## 2023-01-16 ASSESSMENT — LIFESTYLE VARIABLES
HOW MANY STANDARD DRINKS CONTAINING ALCOHOL DO YOU HAVE ON A TYPICAL DAY: PATIENT DOES NOT DRINK
HOW OFTEN DO YOU HAVE A DRINK CONTAINING ALCOHOL: NEVER

## 2023-01-16 NOTE — PROGRESS NOTES
Medicare Annual Wellness Visit    Lovie Denver  is here for Medicare AWV, Fall (2 weeks ago), and Skin Problem    Assessment & Plan   Routine general medical examination at a health care facility - remains independent, functional and active, no indications/needs for other interventions noted at this time- except as noted below and also findings noted on screening medicare questions. Left-sided chest wall pain - C/W CONTUSION AND WE'LL CHECK XRAY R/O FX, ALSO R/O A PTX  -     XR CHEST STANDARD (2 VW); Future  Paroxysmal atrial fibrillation (HCC)- RECURRENT AND FOR F/U [facility], CONFIRMED ON EKG AND WE'LL SEND TO HIS CARDIOLOGIST.  MAY BE CANDIDATE FOR WATCHMAN  -     Magnesium; Future  -     TSH; Future  -     EKG 12 Lead  Primary hypertension - Blood pressure stable and will continue current regimen. Will plan periodic monitoring of renal function, electrolytes, lipid profile. -     Comprehensive Metabolic Panel; Future  -     CBC with Auto Differential; Future  -     Lipid Panel; Future  Mixed hyperlipidemia - Pt will continue to work on a low fat diet and also exercise, wt loss as appropriate. Will continue periodic monitoring of fasting lipid profile, glucose, liver function. -     Comprehensive Metabolic Panel; Future  -     CBC with Auto Differential; Future  -     Lipid Panel; Future  Idiopathic gout of foot, unspecified chronicity, unspecified laterality -  REMAINS STABLE IN REMISSION W CURRENTSUPPRESSIVE THERAPY. WILL MONITOR URIC ACID LEVELS PERIODICALLY. -     Uric Acid;  Future  Benign prostatic hyperplasia without lower urinary tract symptoms - MONITOR PSA DUE NEXT APPT THIS SUMMER  Impaired fasting glucose W FHX DM, WT IS STEADY, MONITOR FASTING GLC AND A1C  -     Hemoglobin A1C; Future  Medicare annual wellness visit, subsequent  Arm skin lesion, right- PERSISTENT LESION W PERIODIC DRAINAGE R FOREARM, REFER FOR DERM EVAL  -     AFL - Candace Wells DO, Dermatology, [address] Recommendations for Preventive Services Due: see orders and patient instructions/AVS.  Recommended screening schedule for the next 5-10 years is provided to the patient in written form: see Patient Instructions/AVS.     Return in 6 months (on 7/16/2023) for Medicare Annual Wellness Visit in 1 year, routine. Subjective   The following acute and/or chronic problems were also addressed today:  Hx of parox afib, was in [facility] last week and w onset of some SOB but no palpitations, cp. Feels may be back in Afib, has appt w [facility] for 1/25 but they req for EKG too- Dr Marline Paredes. His fitbit and kardiomobile state he's back in AFib. Is still on xarelto  We discussed that he could also be a candidate for Watchman    Hypertension: Stable. Denies CP, cough, visual changes, dizziness, palpitations or HA. Lipids:  Is continuing statin therapy and low fat diet. Tolerating medications w/o myalgias or GI upset. Gout, no flares on allopurinol. IFG, last glc was 103, fhx of DM noted, we'll check fasting glc and a1c. Also two weeks ago fell in parking lot 2 weeks ago hitting L upper chest against a cement block, still aching. HAD ? POSSIBLE OSTEOMYELITIS OF 3RD R DISTAL TOE LAST YR NOV, DR LAMBERT, HE GOT SECOND OPINION FROM DR Pema Jack AT [facility] AND DID NOT NEED AMPUTATION. Patient's complete Health Risk Assessment and screening values have been reviewed and are found in Flowsheets. The following problems were reviewed today and where indicated follow up appointments were made and/or referrals ordered.     Positive Risk Factor Screenings with Interventions:    Fall Risk:  Do you feel unsteady or are you worried about falling? : no  2 or more falls in past year?: no  Fall with injury in past year?: (!) yes     Interventions:    No recurring problems, gait problems or balance, see above                                    Objective   Vitals:    01/16/23 0821   BP: 124/74   Pulse: (!) 109   Resp: 14   SpO2: 96%   Weight: 226 lb (102.5 kg)   Height: 6' 4\" (1.93 m)      Body mass index is 27.51 kg/m². General Appearance: alert and oriented to person, place and time, well developed and well- nourished, in no acute distress  Skin: warm and dry, no rash or erythema  Head: normocephalic and atraumatic  Eyes: pupils equal, round, and reactive to light, extraocular eye movements intact, conjunctivae normal  Neck: supple and non-tender without mass, no thyromegaly or thyroid nodules, no cervical lymphadenopathy  Pulmonary/Chest: clear to auscultation bilaterally- no wheezes, rales or rhonchi, normal air movement, no respiratory distress  Cardiovascular: normal rate, IRregular rhythm, normal S1 and S2, no murmurs, rubs, clicks, or gallops, distal pulses intact, no carotid bruits  Abdomen: soft, non-tender, non-distended, normal bowel sounds, no masses or organomegaly  Extremities: no cyanosis, clubbing or edema- TR ERYTHEMA DISTAL R 3RD TOE BUT PRIOR SWELLING RESOLVED AND NO OPEN SORES, DRAINGE OR SIGNIFICANT WARMTH,. TENDERNESS. Musculoskeletal: normal range of motion, no joint swelling, deformity SOME L UPPER CHEST WALL TENDERNESS NOTED  Neurologic: no cranial nerve deficit, gait, coordination and speech normal       Allergies   Allergen Reactions    Norvasc [Amlodipine]      Leg swelling     Prior to Visit Medications    Medication Sig Taking? Authorizing Provider   allopurinol (ZYLOPRIM) 300 MG tablet TAKE 1 TABLET BY MOUTH  DAILY  Felizardo Boas, MD   atorvastatin (LIPITOR) 10 MG tablet Take 1 tablet by mouth in the morning. Felizardo Boas, MD   lisinopril (PRINIVIL;ZESTRIL) 20 MG tablet TAKE 1 TABLET BY MOUTH  DAILY  Felizardo Boas, MD   lisinopril (PRINIVIL;ZESTRIL) 20 MG tablet Take 1 tablet by mouth in the morning.   Felizardo Boas, MD   terazosin (HYTRIN) 1 MG capsule Take 1 capsule by mouth nightly  Felizardo Boas, MD   rivaroxaban (XARELTO) 20 MG TABS tablet Take 1 tablet by mouth daily (with breakfast)  Beena Gallardo MD   indomethacin (INDOCIN) 50 MG capsule Take 1 capsule by mouth 3 times daily (with meals)  Beena Gallardo MD   sildenafil (REVATIO) 20 MG tablet Take 1 tablet by mouth daily as needed (erectile dysfunction)  Beena Gallardo MD   MULTIPLE VITAMIN PO Take by mouth daily  Historical Provider, MD Evans (Including outside providers/suppliers regularly involved in providing care):   Patient Care Team:  Beena Gallardo MD as PCP - Ac Draper MD as PCP - [facility] Empaneled Provider  Luis Felipe Berg MD as Consulting Physician (Cardiology)     Reviewed and updated this visit:  Tobacco  Allergies  Meds  Problems  Med Hx  Surg Hx  Soc Hx  Fam Hx

## 2023-01-16 NOTE — PATIENT INSTRUCTIONS
Preventing Falls: Care Instructions  Overview     Getting around your home safely can be a challenge if you have injuries or health problems that make it easy for you to fall. Loose rugs and furniture in walkways are among the dangers for many older people who have problems walking or who have poor eyesight. People who have conditions such as arthritis, osteoporosis, or dementia also have to be careful not to fall. You can make your home safer with a few simple measures. Follow-up care is a key part of your treatment and safety. Be sure to make and go to all appointments, and call your doctor if you are having problems. It's also a good idea to know your test results and keep a list of the medicines you take. How can you care for yourself at home? Taking care of yourself  Exercise regularly to improve your strength, muscle tone, and balance. Walk if you can. Swimming may be a good choice if you cannot walk easily. Have your vision and hearing checked each year or any time you notice a change. If you have trouble seeing and hearing, you might not be able to avoid objects and could lose your balance. Know the side effects of the medicines you take. Ask your doctor or pharmacist whether the medicines you take can affect your balance. Sleeping pills or sedatives can affect your balance. Limit the amount of alcohol you drink. Alcohol can impair your balance and other senses. Ask your doctor whether calluses or corns on your feet need to be removed. If you wear loose-fitting shoes because of calluses or corns, you can lose your balance and fall. Talk to your doctor if you have numbness in your feet. You may get dizzy if you do not drink enough water. To prevent dehydration, drink plenty of fluids. Choose water and other clear liquids. If you have kidney, heart, or liver disease and have to limit fluids, talk with your doctor before you increase the amount of fluids you drink.   Preventing falls at home  Remove raised doorway thresholds, throw rugs, and clutter. Repair loose carpet or raised areas in the floor. Move furniture and electrical cords to keep them out of walking paths. Use nonskid floor wax, and wipe up spills right away, especially on ceramic tile floors. If you use a walker or cane, put rubber tips on it. If you use crutches, clean the bottoms of them regularly with an abrasive pad, such as steel wool. Keep your house well lit, especially stairways, porches, and outside walkways. Use night-lights in areas such as hallways and bathrooms. Add extra light switches or use remote switches (such as switches that go on or off when you clap your hands) to make it easier to turn lights on if you have to get up during the night. Install sturdy handrails on stairways. Move items in your cabinets so that the things you use a lot are on the lower shelves (about waist level). Keep a cordless phone and a flashlight with new batteries by your bed. If possible, put a phone in each of the main rooms of your house, or carry a cell phone in case you fall and cannot reach a phone. Or, you can wear a device around your neck or wrist. You push a button that sends a signal for help. Wear low-heeled shoes that fit well and give your feet good support. Use footwear with nonskid soles. Check the heels and soles of your shoes for wear. Repair or replace worn heels or soles. Do not wear socks without shoes on smooth floors, such as wood. Walk on the grass when the sidewalks are slippery. If you live in an area that gets snow and ice in the winter, sprinkle salt on slippery steps and sidewalks. Or ask a family member or friend to do this for you. Preventing falls in the bath  Install grab bars and nonskid mats inside and outside your shower or tub and near the toilet and sinks. Use shower chairs and bath benches. Use a hand-held shower head that will allow you to sit while showering.   Get into a tub or shower by putting the weaker leg in first. Get out of a tub or shower with your strong side first.  Repair loose toilet seats and consider installing a raised toilet seat to make getting on and off the toilet easier. Keep your bathroom door unlocked while you are in the shower. Where can you learn more? Go to http://www.mcknight.com/ and enter G117 to learn more about \"Preventing Falls: Care Instructions. \"  Current as of: May 4, 2022               Content Version: 13.5  © 0558-3122 Healthwise, Incorporated. Care instructions adapted under license by [facility] ([facility]). If you have questions about a medical condition or this instruction, always ask your healthcare professional. [address] any warranty or liability for your use of this information. Hearing Loss: Care Instructions  Overview     Hearing loss is a sudden or slow decrease in how well you hear. It can range from mild to severe. Permanent hearing loss can occur with aging. It also can happen when you are exposed long-term to loud noise. Examples include listening to loud music, riding motorcycles, or being around other loud machines. Hearing loss can affect your work and home life. It can make you feel lonely or depressed. You may feel that you have lost your independence. But hearing aids and other devices can help you hear better and feel connected to others. Follow-up care is a key part of your treatment and safety. Be sure to make and go to all appointments, and call your doctor if you are having problems. It's also a good idea to know your test results and keep a list of the medicines you take. How can you care for yourself at home? Avoid loud noises whenever possible. This helps keep your hearing from getting worse. Always wear hearing protection around loud noises. Wear a hearing aid as directed. See a professional who can help you pick a hearing aid that fits you. Have hearing tests as your doctor suggests. They can show whether your hearing has changed. Your hearing aid may need to be adjusted.  Use other devices as needed. These may include:  Telephone amplifiers and hearing aids that can connect to a television, stereo, radio, or microphone.  Devices that use lights or vibrations. These alert you to the doorbell, a ringing telephone, or a baby monitor.  Television closed-captioning. This shows the words at the bottom of the screen. Most new TVs can do this.  TTY (text telephone). This lets you type messages back and forth on the telephone instead of talking or listening. These devices are also called TDD. When messages are typed on the keyboard, they are sent over the phone line to a receiving TTY. The message is shown on a monitor.  Use text messaging, social media, and email if it is hard for you to communicate by telephone.  Try to learn a listening technique called speechreading. It is not lipreading. You pay attention to people's gestures, expressions, posture, and tone of voice. These clues can help you understand what a person is saying. Face the person you are talking to, and have them face you. Make sure the lighting is good. You need to see the other person's face clearly.  Think about counseling if you need help to adjust to your hearing loss.  When should you call for help?  Watch closely for changes in your health, and be sure to contact your doctor if:    You think your hearing is getting worse.     You have new symptoms, such as dizziness or nausea.   Where can you learn more?  Go to https://www.[employer].net/patientEd and enter R798 to learn more about \"Hearing Loss: Care Instructions.\"  Current as of: May 4, 2022               Content Version: 13.5  © 8998-8215 [employer].   Care instructions adapted under license by [employer]. If you have questions about a medical condition or this instruction, always ask your healthcare professional. [employer] disclaims any  warranty or liability for your use of this information. Advance Directives: Care Instructions  Overview  An advance directive is a legal way to state your wishes at the end of your life. It tells your family and your doctor what to do if you can't say what you want. There are two main types of advance directives. You can change them any time your wishes change. Living will. This form tells your family and your doctor your wishes about life support and other treatment. The form is also called a declaration. Medical power of . This form lets you name a person to make treatment decisions for you when you can't speak for yourself. This person is called a health care agent (health care proxy, health care surrogate). The form is also called a durable power of  for health care. If you do not have an advance directive, decisions about your medical care may be made by a family member, or by a doctor or a  who doesn't know you. It may help to think of an advance directive as a gift to the people who care for you. If you have one, they won't have to make tough decisions by themselves. For more information, including forms for your state, see the [address] website (www.caringinfo.org/planning/advance-directives/). Follow-up care is a key part of your treatment and safety. Be sure to make and go to all appointments, and call your doctor if you are having problems. It's also a good idea to know your test results and keep a list of the medicines you take. What should you include in an advance directive? Many states have a unique advance directive form. (It may ask you to address specific issues.) Or you might use a universal form that's approved by many states. If your form doesn't tell you what to address, it may be hard to know what to include in your advance directive. Use the questions below to help you get started.   Who do you want to make decisions about your medical care if you are not able to? What life-support measures do you want if you have a serious illness that gets worse over time or can't be cured? What are you most afraid of that might happen? (Maybe you're afraid of having pain, losing your independence, or being kept alive by machines.)  Where would you prefer to die? (Your home? A hospital? A nursing home?)  Do you want to donate your organs when you die? Do you want certain Confucianism practices performed before you die? When should you call for help? Be sure to contact your doctor if you have any questions. Where can you learn more? Go to http://www.mcknight.com/ and enter R264 to learn more about \"Advance Directives: Care Instructions. \"  Current as of: June 16, 2022               Content Version: 13.5  © 7926-1619 Healthwise, Incorporated. Care instructions adapted under license by [facility] ([facility]). If you have questions about a medical condition or this instruction, always ask your healthcare professional. [place] [address] any warranty or liability for your use of this information. Personalized Preventive Plan for Connie Chaparro Sr. - 1/16/2023  Medicare offers a range of preventive health benefits. Some of the tests and screenings are paid in full while other may be subject to a deductible, co-insurance, and/or copay. Some of these benefits include a comprehensive review of your medical history including lifestyle, illnesses that may run in your family, and various assessments and screenings as appropriate. After reviewing your medical record and screening and assessments performed today your provider may have ordered immunizations, labs, imaging, and/or referrals for you. A list of these orders (if applicable) as well as your Preventive Care list are included within your After Visit Summary for your review.     Other Preventive Recommendations:    A preventive eye exam performed by an eye specialist is recommended every 1-2 years to screen for glaucoma; cataracts, macular degeneration, and other eye disorders. A preventive dental visit is recommended every 6 months. Try to get at least 150 minutes of exercise per week or 10,000 steps per day on a pedometer . Order or download the FREE \"Exercise & Physical Activity: Your Everyday Guide\" from [employer]. Call 2-478.671.4730 or search [employer] online. You need 3593-4660 mg of calcium and 4299-2845 IU of vitamin D per day. It is possible to meet your calcium requirement with diet alone, but a vitamin D supplement is usually necessary to meet this goal.  When exposed to the sun, use a sunscreen that protects against both UVA and UVB radiation with an SPF of 30 or greater. Reapply every 2 to 3 hours or after sweating, drying off with a towel, or swimming. Always wear a seat belt when traveling in a car. Always wear a helmet when riding a bicycle or motorcycle.

## 2023-01-18 DIAGNOSIS — R07.89 LEFT-SIDED CHEST WALL PAIN: Primary | ICD-10-CM

## 2023-01-18 DIAGNOSIS — R07.89 LEFT-SIDED CHEST WALL PAIN: ICD-10-CM

## 2023-01-18 NOTE — RESULT ENCOUNTER NOTE
History of Present Illness:  Jean is a pleasant 84-year-old lady with limited cutaneous systemic sclerosis (CREST syndrome) with pulmonary arterial hypertension, generalized osteoarthritis, and osteoporosis here in the clinic for a follow-up visit.    She was last seen in the clinic in February 2018, primarily for exacerbation of right knee pain and swelling. Her right knee was aspirated at that time, which showed mild inflammation with WBC count 1500 but no infection or crystals. She was given intra-articular steroid injection. Unfortunately,  it exacerbated her diabetes, which prompted a visit to the ER. She was discharged home with blood sugar improved. She presents today with very minimal pain on her right knee and no recurrence of swelling. She also has mild arthralgia on her hands and feet which she reports to be tolerable. She denies of any joint swelling. She continues on hydroxychloroquine 200 mg daily. She has been going to the wound clinic for a small ulcer on her right leg since mid-January. She feels her right leg is tight with the wound dressing, but has not specifically noted any progression of skin tightness over her extremities, face, and trunk. Reports GERD to be stable on Nexium. She reports Raynaud's phenomenon to be stable and no digital discoloration. She denies of any chest pain or cough. She sees Dr. Bermudez for pulmonary arterial hypertension. No history of ILD. Blood pressure has been stable. Creatinine recently has been trending down.    She has osteoporosis with history of L1 vertebral compression fracture January 2018. She tolerated her first dose of zoledronic acid treatment on February 28, 2018. No interval fractures. She takes calcium and vitamin D. No recent falls.    ROS:   As above. No fever, chills, cough, chest pain, or  symptoms. No sicca or carpal tunnel symptoms.      ALLERGIES:   Allergen Reactions   • Azithromycin RASH   • Adhesive   (Environmental) RASH     most  Please call pt, there is no rib fracture and lungs are clear. However the radiologist had some questionable findings, he mentioned a nipple shadow or lung nodule, also possible hernia R lower lung--- but I checked and he had a normal CXR at [facility] just last yr. Suggest instead of doing the CT's recommended by radiology of chest and abd, that w no rib fracture or acute finding, he instead check a cxr at [facility] in 4-6 weeks to see if any changes as I'd trust their read much more and it will be a better read there too since they'll compare this to last yr.   So cxr check [facility] in a month, dx L rib pain bandaids  ok, but EKG leads caused rash with sores   • Ancef [Cefazolin Sodium] HIVES, RASH and PRURITUS   • Eucerin [Skin Protectants, Misc.] Other (See Comments)     possible sensitivity to Eurcerin constituent: Isothiazolinone   • Versed ANXIETY and Other (See Comments)     Pt did NOT like the effects of this drug; hyperactivity       Current Outpatient Medications   Medication Sig   • diphenhydrAMINE (DIPHENHIST) 25 MG tablet Take 1 tablet by mouth every 6 hours as needed for Itching.   • polyethylene glycol (MIRALAX) powder TAKE 1 CAPFUL MIXED IN 8OZ WATER OR JUICE ONCE DAILY AS NEEDED FOR CONSTIPATION   • Skin Protectants, Misc. (DIMETHICONE-ZINC OXIDE) cream APPLY TO BUTTOCKS TWICE DAILY;APPLY TO BUTTOCKS AS NEEDED   • glimepiride (AMARYL) 2 MG tablet Take 3 tablets by mouth daily (before breakfast).   • Cinnamon 500 MG Cap TAKE 2 CAPS (1,000MG) BY MOUTH TWICE A DAY   • MEPHYTON 5 MG tablet TAKE 1 TABLET BY MOUTH TONIGHT (2 18 19) AND 1 TABLET TOMORROW MORNING (2 19 19)   • triamcinolone (ARISTOCORT) 0.1 % ointment Apply topically 3 days a week.   • betamethasone valerate (VALISONE) 0.1 % cream Apply topically 2 times daily as needed.   • clotrimazole (LOTRIMIN) 1 % cream Apply topically as needed.   • ACETAMINOPHEN EXTRA STRENGTH 500 MG tablet TAKE 1 TABLET TWICE DAILY, MAY TAKE AN ADDITIONAL 1-2 TABLETS AS NEEDED. MAX 6 TABLETS IN 24 HOURS   • docusate sodium (COLACE) 250 MG capsule Take 1 capsule by mouth 2 times daily.   • potassium chloride (KLOR-CON M) 10 MEQ drew ER tablet TAKE 3 TABS (30MEQ) BY MOUTH IN THE MORNING;TAKE 2 TABS (20MEQ) BY MOUTH IN THE AFTERNOON;TAKE AN EXTRA 1 TABLET BY MOUTH WHEN   • warfarin (COUMADIN) 7.5 MG tablet Take 7.5 mg  S/S/M/T/Th Take 3.75 mg W/F Dose: 1/2 tablet (=3.75 mg)   • collagenase (SANTYL) 250 UNIT/GM ointment Apply topically daily. To right ankle wound.   • Menthol, Topical Analgesic, (ICY HOT EX) Apply topically daily as needed (Knee Pain).   • mupirocin  (BACTROBAN) 2 % ointment APPLY TOPICALLY TWICE DAILY   • JANUVIA 100 MG tablet TAKE 1 TABLET BY MOUTH DAILY.   • hydroxychloroquine (PLAQUENIL) 200 MG tablet TAKE 1 TABLET BY MOUTH DAILY.   • esomeprazole (NEXIUM) 40 MG capsule TAKE 1 CAPSULE BY MOUTH DAILY (BEFORE BREAKFAST).   • fluticasone-salmeterol (ADVAIR HFA) 115-21 MCG/ACT inhaler INHALE 2 PUFFS BY MOUTH TWICE DAILY FOR ASTHMA- RINSE MOUTH AFTER USE   • CALCIUM ANTACID 500 MG chewable tablet CHEW AND SWALLOW 1 TAB BY MOUTH AS NEEDED FOR HEARTBURN   • ferrous sulfate 325 (65 FE) MG tablet Take 325 mg by mouth every other day.    • amiodarone (PACERONE,CORDARONE) 200 MG tablet Take 1 tablet by mouth daily. Take 1 tablet, twice daily for one week. Then take one pill daily.   • MUCINEX 600 MG 12 hr tablet TAKE 2 TABLETS BY MOUTH 2 TIMES DAILY. (Patient taking differently: TAKE 2 TABLETS BY MOUTH 2 TIMES DAILY AS NEEDED FOR COLD SYMPTOMS)   • blood glucose test strip Test blood sugar daily as directed. Diagnosis: E11.9. Meter: TRUE METRIX   • calcium, oyster shell, 500 MG tablet Take 1 tablet by mouth daily. (Patient taking differently: Take 500 mg by mouth 2 times daily. )   • cholecalciferol (VITAMIN D3) 1000 units tablet Take 1 tablet by mouth daily.   • metOLazone (ZAROXOLYN) 2.5 MG tablet TAKE 1 TABLET BY MOUTH TWO TIMES PER WEEK AS DIRECTED   • Benzocaine-Menthol 10-2.1 MG Lozenge Use lozenge as needed for sore throat   • Chlorpheniramine-APAP (CORICIDIN) 2-325 MG Tab One in the morning as needed for cold symptoms.   • folic acid (FOLATE) 400 MCG tablet TAKE 1 TABLET BY MOUTH DAILY.   • atorvastatin (LIPITOR) 40 MG tablet TAKE 1 TABLET BY MOUTH DAILY AT BEDTIME FOR LOWER CHOLESTEROL LEVEL   • Multiple Vitamins-Minerals (PRESERVISION AREDS 2) Cap Take 1 capsule by mouth 2 times daily.   • torsemide (DEMADEX) 20 MG tablet TAKE 4 TABS (80MG) BY MOUTH ONCE DAILY   • CARBOXYMethylcellulose (REFRESH TEARS) 0.5 % ophthalmic solution Place 2 drops into both eyes 4  times daily.   • loratadine (CLARITIN) 10 MG tablet TAKE 1 TABLET BY MOUTH DAILY. (Patient taking differently: Take 10 mg by mouth daily as needed for Allergies. )   • verapamil 120 MG 24 hr capsule Take 1 capsule by mouth nightly.   • HYDROcodone-acetaminophen (NORCO) 5-325 MG per tablet Take 1 tablet by mouth every 12 hours as needed for Pain.    • Tadalafil, PAH, (ADCIRCA) 20 MG Tab Take 20 mg by mouth daily.   • Sennosides (SENNA) 8.6 MG Cap Take 8.6 mg by mouth daily as needed (constipation).   • mometasone (NASONEX) 50 MCG/ACT nasal spray Spray 2 sprays in each nostril daily. (Patient taking differently: Spray 2 sprays in each nostril daily as needed. )   • Blood Glucose Monitoring Suppl (TRUE METRIX METER) w/Device Kit 1 each by In Vitro route daily. TEST ONCE DAILY   • ONETOUCH DELICA LANCETS 33G Misc Use to test blood sugars daily   • ONETOUCH VERIO test strip 1 each daily. Test blood sugar one times daily as directed. Diagnosis: E11.9. Meter: One Touch Verio Flex   • saline nasal (AYR,NASOGEL) Gel ONCE DAILY AS NEEDED IN NOSTRILS PRN   • hydroCORTisone (ANUSOL-HC) 25 MG suppository Place 1 suppository rectally nightly as needed (Nightly as needed ).   • albuterol (PROAIR HFA) 108 (90 BASE) MCG/ACT inhaler Inhale 2 puffs into the lungs every 4 hours as needed for Shortness of Breath or Wheezing.   • aspirin  MG EC tablet Take 1 tablet by mouth daily.   • VITAMIN B-12 500 MCG PO TABS ONE TABLET DAILY self directed   • VITAMIN B-6 50 MG PO TABS ONE TABLET DAILY self directed     No current facility-administered medications for this visit.        PHYSICAL EXAM :  Visit Vitals  /48 (BP Location: [facility], Patient Position: Sitting, Cuff Size: Regular)   Pulse 64   Ht 5' 3\" (1.6 m)   Wt 56.2 kg   BMI 21.97 kg/m²     General: not in apparent distress  Skin: Few facial telangiectasia, no sclerodactyly, normal skin turgor over proximal and facial areas, no digital discoloration or ulcerations. Wound dressing  right leg.  HEENT: no iritis or conjunctivitis, no malar rash, no scalp lesions, moist buccal mucosae, no mucosal ulcer  Neck: no lymphadenopathy  C/L: equal chest expansion, clear breath sounds, no crackles, no wheeze  CVS: regular rhythm, normal heart sounds  Abdomen: soft, normoactive bowel sounds, non-tender  Musculoskeletal: Chronic synovitis right third PIP joint which is nontender. Multiple Heberden's and Elle's nodes both hands. Bony hypertrophy and squaring of first CMC joints. Right knee with crepitus, cool to palpation, no effusion.  No point tenderness over spinous processes of vertebra.  Neurologic: no focal deficit. Muscle strength 5/5 proximal and distal groups.    Assessment and Plan:  1. Limited Cutaneous Systemic Sclerosis/CREST syndrome  2. 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 High risk medication (hydroxychloroquine for inflammatory arthritis)  4. Pulmonary Arterial Hypertension, 2 to CREST  5. Osteoarthritis, multiple joints (hands, right knee)  6. Established osteoporosis (L1 VCF per XR 1/9/18 - age indeterminate; started on zoledronic acid 2/28/18)  7. Right leg ulcer (since 1/2019)    - Seen here today for follow-up visit for her Limited cutaneous systemic sclerosis and osteoporosis.   - Last seen in clinic February 2018 mainly for exacerbation right knee arthritis. Had arthrocentesis of right knee which showed mild inflammation without infection or crystals. Received intra-articular steroid injection which exacerbated her diabetes.  - As for her limited cutaneous systemic sclerosis, this appears to be stable overall. Arthritis stable on hydroxychloroquine. She has few facial telangiectasia, but no other scleroderma related skin changes such as sclerodactyly. Raynaud's phenomenon and GERD have been stable.  - No interstitial lung disease. Chest x-ray 2/14/19 negative for interstitial changes.  - Pulmonary arterial hypertension has been stable under the care of Dr. Bermudez  - No renal  hypertension. Renal function and blood pressure has been stable.  - Will continue on hydroxychloroquine 200 mg daily for inflammatory arthritis. Continue regular eye monitoring. May consider switching to methotrexate if develops scleroderma cutaneous changes.  - As for established osteoporosis with history of L1 vertebral compression fracture January 2018, tolerated her first zoledronic acid treatment 2/28/18. Will continue on the same treatment with zoledronic acid 5 mg q. yearly. She is due for her next dose. Requested labs today and if results are favorable will proceed with treatment. Continue adequate calcium and vitamin D intake. Fall prevention counseling. Weightbearing activities as tolerated.  - goes to wound clinic for right leg ulcer  - RTC 3 mos or sooner if needed    Thank you for allowing me to participate in the care of Jean . If you have any questions, please feel free to contact me.    Sincerely,    Riki Goyal M.D.    On 03/12/19, Jade SUE scribed the services personally performed by Riki Goyal MD     The documentation recorded by the scribe accurately and completely reflects the service(s) I personally performed and the decisions made by me.

## 2023-01-19 DIAGNOSIS — R73.01 IMPAIRED FASTING GLUCOSE: ICD-10-CM

## 2023-01-19 DIAGNOSIS — M10.079 IDIOPATHIC GOUT OF FOOT, UNSPECIFIED CHRONICITY, UNSPECIFIED LATERALITY: ICD-10-CM

## 2023-01-19 DIAGNOSIS — E78.2 MIXED HYPERLIPIDEMIA: ICD-10-CM

## 2023-01-19 DIAGNOSIS — I48.0 PAROXYSMAL ATRIAL FIBRILLATION (HCC): ICD-10-CM

## 2023-01-19 DIAGNOSIS — I10 PRIMARY HYPERTENSION: ICD-10-CM

## 2023-01-19 LAB
A/G RATIO: 1.5 (ref 1.1–2.2)
ALBUMIN SERPL-MCNC: 4.2 G/DL (ref 3.4–5)
ALP BLD-CCNC: 70 U/L (ref 40–129)
ALT SERPL-CCNC: 14 U/L (ref 10–40)
ANION GAP SERPL CALCULATED.3IONS-SCNC: 10 MMOL/L (ref 3–16)
AST SERPL-CCNC: 19 U/L (ref 15–37)
BASOPHILS ABSOLUTE: 0.1 K/UL (ref 0–0.2)
BASOPHILS RELATIVE PERCENT: 1 %
BILIRUB SERPL-MCNC: 0.3 MG/DL (ref 0–1)
BUN BLDV-MCNC: 17 MG/DL (ref 7–20)
CALCIUM SERPL-MCNC: 9.7 MG/DL (ref 8.3–10.6)
CHLORIDE BLD-SCNC: 101 MMOL/L (ref 99–110)
CHOLESTEROL, TOTAL: 168 MG/DL (ref 0–199)
CO2: 25 MMOL/L (ref 21–32)
CREAT SERPL-MCNC: 1.1 MG/DL (ref 0.8–1.3)
EOSINOPHILS ABSOLUTE: 0.3 K/UL (ref 0–0.6)
EOSINOPHILS RELATIVE PERCENT: 4.3 %
GFR SERPL CREATININE-BSD FRML MDRD: >60 ML/MIN/{1.73_M2}
GLUCOSE BLD-MCNC: 100 MG/DL (ref 70–99)
HCT VFR BLD CALC: 41.4 % (ref 40.5–52.5)
HDLC SERPL-MCNC: 54 MG/DL (ref 40–60)
HEMOGLOBIN: 13.6 G/DL (ref 13.5–17.5)
LDL CHOLESTEROL CALCULATED: 101 MG/DL
LYMPHOCYTES ABSOLUTE: 1 K/UL (ref 1–5.1)
LYMPHOCYTES RELATIVE PERCENT: 14.7 %
MAGNESIUM: 2.1 MG/DL (ref 1.8–2.4)
MCH RBC QN AUTO: 31.8 PG (ref 26–34)
MCHC RBC AUTO-ENTMCNC: 33 G/DL (ref 31–36)
MCV RBC AUTO: 96.2 FL (ref 80–100)
MONOCYTES ABSOLUTE: 0.6 K/UL (ref 0–1.3)
MONOCYTES RELATIVE PERCENT: 8.2 %
NEUTROPHILS ABSOLUTE: 5.1 K/UL (ref 1.7–7.7)
NEUTROPHILS RELATIVE PERCENT: 71.8 %
PDW BLD-RTO: 15.1 % (ref 12.4–15.4)
PLATELET # BLD: 231 K/UL (ref 135–450)
PMV BLD AUTO: 7.9 FL (ref 5–10.5)
POTASSIUM SERPL-SCNC: 4.4 MMOL/L (ref 3.5–5.1)
RBC # BLD: 4.3 M/UL (ref 4.2–5.9)
SODIUM BLD-SCNC: 136 MMOL/L (ref 136–145)
TOTAL PROTEIN: 7 G/DL (ref 6.4–8.2)
TRIGL SERPL-MCNC: 67 MG/DL (ref 0–150)
TSH SERPL DL<=0.05 MIU/L-ACNC: 1.65 UIU/ML (ref 0.27–4.2)
URIC ACID, SERUM: 5.6 MG/DL (ref 3.5–7.2)
VLDLC SERPL CALC-MCNC: 13 MG/DL
WBC # BLD: 7.1 K/UL (ref 4–11)

## 2023-01-19 PROCEDURE — 36415 COLL VENOUS BLD VENIPUNCTURE: CPT | Performed by: INTERNAL MEDICINE

## 2023-01-20 LAB
ESTIMATED AVERAGE GLUCOSE: 99.7 MG/DL
HBA1C MFR BLD: 5.1 %

## 2023-01-20 NOTE — RESULT ENCOUNTER NOTE
Call pt, labs ok/chol ok as well as uric acid level, thyroid fxn.   Avg sugars also ok w/o evidence of DM

## 2023-01-26 DIAGNOSIS — M10.079 IDIOPATHIC GOUT OF FOOT, UNSPECIFIED CHRONICITY, UNSPECIFIED LATERALITY: ICD-10-CM

## 2023-01-27 RX ORDER — ALLOPURINOL 300 MG/1
300 TABLET ORAL DAILY
Qty: 90 TABLET | Refills: 3 | Status: SHIPPED | OUTPATIENT
Start: 2023-01-27

## 2023-05-07 DIAGNOSIS — I10 ESSENTIAL HYPERTENSION: ICD-10-CM

## 2023-05-07 DIAGNOSIS — N40.0 BENIGN PROSTATIC HYPERPLASIA WITHOUT LOWER URINARY TRACT SYMPTOMS: ICD-10-CM

## 2023-05-08 RX ORDER — TERAZOSIN 1 MG/1
CAPSULE ORAL
Qty: 90 CAPSULE | Refills: 3 | OUTPATIENT
Start: 2023-05-08

## 2023-05-24 ENCOUNTER — OFFICE VISIT (OUTPATIENT)
Dept: INTERNAL MEDICINE CLINIC | Age: 68
End: 2023-05-24
Payer: MEDICARE

## 2023-05-24 VITALS
HEART RATE: 103 BPM | SYSTOLIC BLOOD PRESSURE: 136 MMHG | RESPIRATION RATE: 17 BRPM | WEIGHT: 227 LBS | OXYGEN SATURATION: 96 % | BODY MASS INDEX: 27.63 KG/M2 | DIASTOLIC BLOOD PRESSURE: 82 MMHG

## 2023-05-24 DIAGNOSIS — G58.8 OTHER MONONEUROPATHY: ICD-10-CM

## 2023-05-24 DIAGNOSIS — N40.0 BENIGN PROSTATIC HYPERPLASIA WITHOUT LOWER URINARY TRACT SYMPTOMS: ICD-10-CM

## 2023-05-24 DIAGNOSIS — I10 PRIMARY HYPERTENSION: ICD-10-CM

## 2023-05-24 DIAGNOSIS — L03.115 CELLULITIS OF RIGHT LEG: ICD-10-CM

## 2023-05-24 DIAGNOSIS — I48.0 PAROXYSMAL ATRIAL FIBRILLATION (HCC): ICD-10-CM

## 2023-05-24 DIAGNOSIS — R73.01 IMPAIRED FASTING GLUCOSE: ICD-10-CM

## 2023-05-24 DIAGNOSIS — G58.8 OTHER MONONEUROPATHY: Primary | ICD-10-CM

## 2023-05-24 DIAGNOSIS — E78.2 MIXED HYPERLIPIDEMIA: ICD-10-CM

## 2023-05-24 LAB — VIT B12 SERPL-MCNC: 825 PG/ML (ref 211–911)

## 2023-05-24 PROCEDURE — 36415 COLL VENOUS BLD VENIPUNCTURE: CPT | Performed by: INTERNAL MEDICINE

## 2023-05-24 PROCEDURE — 3075F SYST BP GE 130 - 139MM HG: CPT | Performed by: INTERNAL MEDICINE

## 2023-05-24 PROCEDURE — 1123F ACP DISCUSS/DSCN MKR DOCD: CPT | Performed by: INTERNAL MEDICINE

## 2023-05-24 PROCEDURE — 3017F COLORECTAL CA SCREEN DOC REV: CPT | Performed by: INTERNAL MEDICINE

## 2023-05-24 PROCEDURE — 1036F TOBACCO NON-USER: CPT | Performed by: INTERNAL MEDICINE

## 2023-05-24 PROCEDURE — 3079F DIAST BP 80-89 MM HG: CPT | Performed by: INTERNAL MEDICINE

## 2023-05-24 PROCEDURE — G8417 CALC BMI ABV UP PARAM F/U: HCPCS | Performed by: INTERNAL MEDICINE

## 2023-05-24 PROCEDURE — G8427 DOCREV CUR MEDS BY ELIG CLIN: HCPCS | Performed by: INTERNAL MEDICINE

## 2023-05-24 PROCEDURE — 99214 OFFICE O/P EST MOD 30 MIN: CPT | Performed by: INTERNAL MEDICINE

## 2023-05-24 RX ORDER — SULFAMETHOXAZOLE AND TRIMETHOPRIM 800; 160 MG/1; MG/1
1 TABLET ORAL 2 TIMES DAILY
Qty: 20 TABLET | Refills: 0 | Status: SHIPPED | OUTPATIENT
Start: 2023-05-24 | End: 2023-06-03

## 2023-05-24 SDOH — ECONOMIC STABILITY: HOUSING INSECURITY
IN THE LAST 12 MONTHS, WAS THERE A TIME WHEN YOU DID NOT HAVE A STEADY PLACE TO SLEEP OR SLEPT IN A SHELTER (INCLUDING NOW)?: PATIENT REFUSED

## 2023-05-24 SDOH — ECONOMIC STABILITY: INCOME INSECURITY: HOW HARD IS IT FOR YOU TO PAY FOR THE VERY BASICS LIKE FOOD, HOUSING, MEDICAL CARE, AND HEATING?: PATIENT DECLINED

## 2023-05-24 SDOH — ECONOMIC STABILITY: FOOD INSECURITY: WITHIN THE PAST 12 MONTHS, THE FOOD YOU BOUGHT JUST DIDN'T LAST AND YOU DIDN'T HAVE MONEY TO GET MORE.: PATIENT DECLINED

## 2023-05-24 SDOH — ECONOMIC STABILITY: FOOD INSECURITY: WITHIN THE PAST 12 MONTHS, YOU WORRIED THAT YOUR FOOD WOULD RUN OUT BEFORE YOU GOT MONEY TO BUY MORE.: PATIENT DECLINED

## 2023-05-24 NOTE — PROGRESS NOTES
Future  -     Hemoglobin A1C; Future  -     Maureen Mccurdy MD, Physical Medicine, [place]    Paroxysmal atrial fibrillation [facility]) - Pt clinically w/o evidence of cardiovascular instability, cont regimen.    -     TSH; Future  -     Magnesium; Future  -     CBC; Future    Primary hypertension - Blood pressure stable and will continue current regimen. Will plan periodic monitoring of renal function, electrolytes, lipid profile. -     Comprehensive Metabolic Panel; Future  -     Lipid Panel; Future  -     Magnesium; Future    Mixed hyperlipidemia  - Pt will continue to work on a low fat diet and also exercise, wt loss as appropriate. Will continue periodic monitoring of fasting lipid profile, glucose, liver function. -     Comprehensive Metabolic Panel; Future  -     Lipid Panel; Future    Impaired fasting glucose - will continue to monitor fasting labs/glc for any progression to DM. Patient will continue to try to work on diet modification.    -     Hemoglobin A1C; Future  -     Comprehensive Metabolic Panel; Future    Benign prostatic hyperplasia without lower urinary tract symptoms  -     PSA, Prostatic Specific Antigen; Future    Cellulitis of right leg-  clinically c/w presentation,  treatment as noted below- To call back one week if not improving. Is to elevate leg 2-3x/day and also try hot compresses. -     sulfamethoxazole-trimethoprim (BACTRIM DS;SEPTRA DS) 800-160 MG per tablet;  Take 1 tablet by mouth 2 times daily for 10 days

## 2023-05-25 LAB
EST. AVERAGE GLUCOSE BLD GHB EST-MCNC: 99.7 MG/DL
HBA1C MFR BLD: 5.1 %

## 2023-06-20 ENCOUNTER — TELEPHONE (OUTPATIENT)
Dept: INTERNAL MEDICINE CLINIC | Age: 68
End: 2023-06-20

## 2023-06-20 DIAGNOSIS — L03.115 CELLULITIS OF RIGHT LEG: ICD-10-CM

## 2023-06-20 RX ORDER — SULFAMETHOXAZOLE AND TRIMETHOPRIM 800; 160 MG/1; MG/1
1 TABLET ORAL 2 TIMES DAILY
Qty: 20 TABLET | Refills: 0 | Status: SHIPPED | OUTPATIENT
Start: 2023-06-20 | End: 2023-06-30

## 2023-06-20 NOTE — TELEPHONE ENCOUNTER
Patient calls- he was seen 5/24 for spider bite. Bactrim was prescribed which he has completed. He states it did get better but he still does have some redness. Wanted to know if you think he needs another round of ABX? Please advise.

## 2023-06-26 ENCOUNTER — OFFICE VISIT (OUTPATIENT)
Dept: INTERNAL MEDICINE CLINIC | Age: 68
End: 2023-06-26
Payer: MEDICARE

## 2023-06-26 VITALS
RESPIRATION RATE: 17 BRPM | HEART RATE: 78 BPM | SYSTOLIC BLOOD PRESSURE: 126 MMHG | OXYGEN SATURATION: 97 % | DIASTOLIC BLOOD PRESSURE: 78 MMHG

## 2023-06-26 DIAGNOSIS — G58.8 OTHER MONONEUROPATHY: ICD-10-CM

## 2023-06-26 DIAGNOSIS — L50.9 HIVES: Primary | ICD-10-CM

## 2023-06-26 DIAGNOSIS — Z88.2 ALLERGY TO SULFA DRUGS: ICD-10-CM

## 2023-06-26 PROCEDURE — G8417 CALC BMI ABV UP PARAM F/U: HCPCS | Performed by: INTERNAL MEDICINE

## 2023-06-26 PROCEDURE — G8427 DOCREV CUR MEDS BY ELIG CLIN: HCPCS | Performed by: INTERNAL MEDICINE

## 2023-06-26 PROCEDURE — 1036F TOBACCO NON-USER: CPT | Performed by: INTERNAL MEDICINE

## 2023-06-26 PROCEDURE — 99213 OFFICE O/P EST LOW 20 MIN: CPT | Performed by: INTERNAL MEDICINE

## 2023-06-26 PROCEDURE — 3074F SYST BP LT 130 MM HG: CPT | Performed by: INTERNAL MEDICINE

## 2023-06-26 PROCEDURE — 3017F COLORECTAL CA SCREEN DOC REV: CPT | Performed by: INTERNAL MEDICINE

## 2023-06-26 PROCEDURE — 1123F ACP DISCUSS/DSCN MKR DOCD: CPT | Performed by: INTERNAL MEDICINE

## 2023-06-26 PROCEDURE — 3078F DIAST BP <80 MM HG: CPT | Performed by: INTERNAL MEDICINE

## 2023-06-26 RX ORDER — PREDNISONE 5 MG/1
TABLET ORAL
Qty: 36 TABLET | Refills: 0 | Status: SHIPPED | OUTPATIENT
Start: 2023-06-26

## 2023-06-26 RX ORDER — HYDROXYZINE HYDROCHLORIDE 25 MG/1
25 TABLET, FILM COATED ORAL EVERY 8 HOURS PRN
Qty: 30 TABLET | Refills: 0 | Status: SHIPPED | OUTPATIENT
Start: 2023-06-26 | End: 2023-07-26

## 2023-07-14 DIAGNOSIS — I10 PRIMARY HYPERTENSION: ICD-10-CM

## 2023-07-14 DIAGNOSIS — R73.01 IMPAIRED FASTING GLUCOSE: ICD-10-CM

## 2023-07-14 DIAGNOSIS — N40.0 BENIGN PROSTATIC HYPERPLASIA WITHOUT LOWER URINARY TRACT SYMPTOMS: ICD-10-CM

## 2023-07-14 DIAGNOSIS — I48.0 PAROXYSMAL ATRIAL FIBRILLATION (HCC): ICD-10-CM

## 2023-07-14 DIAGNOSIS — E78.2 MIXED HYPERLIPIDEMIA: ICD-10-CM

## 2023-07-14 PROCEDURE — 36415 COLL VENOUS BLD VENIPUNCTURE: CPT | Performed by: INTERNAL MEDICINE

## 2023-07-15 LAB
ALBUMIN SERPL-MCNC: 4.6 G/DL (ref 3.4–5)
ALBUMIN/GLOB SERPL: 1.8 {RATIO} (ref 1.1–2.2)
ALP SERPL-CCNC: 62 U/L (ref 40–129)
ALT SERPL-CCNC: 19 U/L (ref 10–40)
ANION GAP SERPL CALCULATED.3IONS-SCNC: 16 MMOL/L (ref 3–16)
AST SERPL-CCNC: 23 U/L (ref 15–37)
BILIRUB SERPL-MCNC: 0.6 MG/DL (ref 0–1)
BUN SERPL-MCNC: 29 MG/DL (ref 7–20)
CALCIUM SERPL-MCNC: 10.1 MG/DL (ref 8.3–10.6)
CHLORIDE SERPL-SCNC: 102 MMOL/L (ref 99–110)
CHOLEST SERPL-MCNC: 187 MG/DL (ref 0–199)
CO2 SERPL-SCNC: 21 MMOL/L (ref 21–32)
CREAT SERPL-MCNC: 1.5 MG/DL (ref 0.8–1.3)
DEPRECATED RDW RBC AUTO: 14.7 % (ref 12.4–15.4)
GFR SERPLBLD CREATININE-BSD FMLA CKD-EPI: 50 ML/MIN/{1.73_M2}
GLUCOSE SERPL-MCNC: 105 MG/DL (ref 70–99)
HCT VFR BLD AUTO: 39.8 % (ref 40.5–52.5)
HDLC SERPL-MCNC: 60 MG/DL (ref 40–60)
HGB BLD-MCNC: 13.7 G/DL (ref 13.5–17.5)
LDLC SERPL CALC-MCNC: 96 MG/DL
MAGNESIUM SERPL-MCNC: 2.2 MG/DL (ref 1.8–2.4)
MCH RBC QN AUTO: 33.6 PG (ref 26–34)
MCHC RBC AUTO-ENTMCNC: 34.4 G/DL (ref 31–36)
MCV RBC AUTO: 97.8 FL (ref 80–100)
PLATELET # BLD AUTO: 199 K/UL (ref 135–450)
PMV BLD AUTO: 7.8 FL (ref 5–10.5)
POTASSIUM SERPL-SCNC: 4.2 MMOL/L (ref 3.5–5.1)
PROT SERPL-MCNC: 7.1 G/DL (ref 6.4–8.2)
PSA SERPL DL<=0.01 NG/ML-MCNC: 0.7 NG/ML (ref 0–4)
RBC # BLD AUTO: 4.07 M/UL (ref 4.2–5.9)
SODIUM SERPL-SCNC: 139 MMOL/L (ref 136–145)
TRIGL SERPL-MCNC: 154 MG/DL (ref 0–150)
TSH SERPL DL<=0.005 MIU/L-ACNC: 1.04 UIU/ML (ref 0.27–4.2)
VLDLC SERPL CALC-MCNC: 31 MG/DL
WBC # BLD AUTO: 8.2 K/UL (ref 4–11)

## 2023-07-17 ENCOUNTER — OFFICE VISIT (OUTPATIENT)
Dept: INTERNAL MEDICINE CLINIC | Age: 68
End: 2023-07-17
Payer: MEDICARE

## 2023-07-17 VITALS
DIASTOLIC BLOOD PRESSURE: 84 MMHG | HEART RATE: 60 BPM | BODY MASS INDEX: 27.39 KG/M2 | SYSTOLIC BLOOD PRESSURE: 136 MMHG | RESPIRATION RATE: 14 BRPM | WEIGHT: 225 LBS

## 2023-07-17 DIAGNOSIS — I48.0 PAROXYSMAL ATRIAL FIBRILLATION (HCC): Primary | ICD-10-CM

## 2023-07-17 DIAGNOSIS — M10.079 IDIOPATHIC GOUT OF FOOT, UNSPECIFIED CHRONICITY, UNSPECIFIED LATERALITY: ICD-10-CM

## 2023-07-17 DIAGNOSIS — E78.2 MIXED HYPERLIPIDEMIA: ICD-10-CM

## 2023-07-17 DIAGNOSIS — I10 PRIMARY HYPERTENSION: ICD-10-CM

## 2023-07-17 DIAGNOSIS — R73.01 IMPAIRED FASTING GLUCOSE: ICD-10-CM

## 2023-07-17 PROCEDURE — G8417 CALC BMI ABV UP PARAM F/U: HCPCS | Performed by: INTERNAL MEDICINE

## 2023-07-17 PROCEDURE — 1036F TOBACCO NON-USER: CPT | Performed by: INTERNAL MEDICINE

## 2023-07-17 PROCEDURE — 3079F DIAST BP 80-89 MM HG: CPT | Performed by: INTERNAL MEDICINE

## 2023-07-17 PROCEDURE — 3017F COLORECTAL CA SCREEN DOC REV: CPT | Performed by: INTERNAL MEDICINE

## 2023-07-17 PROCEDURE — 3075F SYST BP GE 130 - 139MM HG: CPT | Performed by: INTERNAL MEDICINE

## 2023-07-17 PROCEDURE — 1123F ACP DISCUSS/DSCN MKR DOCD: CPT | Performed by: INTERNAL MEDICINE

## 2023-07-17 PROCEDURE — 99214 OFFICE O/P EST MOD 30 MIN: CPT | Performed by: INTERNAL MEDICINE

## 2023-07-17 PROCEDURE — G8427 DOCREV CUR MEDS BY ELIG CLIN: HCPCS | Performed by: INTERNAL MEDICINE

## 2023-07-17 NOTE — RESULT ENCOUNTER NOTE
I DISCUSSED LAB W PT, HE'S DEHYDRATED W  DECR KIDNEY FXN AND WILL PUSH FLUIDS, THEN F/U LAB 2 WEEKS.   CHOL WAS OK

## 2023-09-01 ENCOUNTER — PROCEDURE VISIT (OUTPATIENT)
Dept: PHYSICAL MEDICINE AND REHAB | Age: 68
End: 2023-09-01

## 2023-09-01 DIAGNOSIS — M79.601 PARESTHESIA AND PAIN OF BOTH UPPER EXTREMITIES: ICD-10-CM

## 2023-09-01 DIAGNOSIS — M79.602 PARESTHESIA AND PAIN OF BOTH UPPER EXTREMITIES: ICD-10-CM

## 2023-09-01 DIAGNOSIS — M54.17 LUMBOSACRAL RADICULOPATHY AT S1: ICD-10-CM

## 2023-09-01 DIAGNOSIS — R20.2 PARESTHESIA AND PAIN OF BOTH UPPER EXTREMITIES: ICD-10-CM

## 2023-09-01 DIAGNOSIS — E11.42 DIABETIC SENSORIMOTOR POLYNEUROPATHY (HCC): Primary | ICD-10-CM

## 2023-09-01 NOTE — PROGRESS NOTES
Med Normal None Normal Normal None Normal   Ant Tibialis Normal None Normal Normal None Normal   EHL Normal None Dec # Normal None Dec #   FDL Normal None Dec #, Larger Normal None \"   Gastroc Increased ++ Dec #, Polys Normal None Polys   EDB Increased + Dec #, Larger Increased + Dec #, Larger   1st dors int \" Occ Polys \" Occ Polys       FINDINGS:   EMG of the lumbar paraspinals and both lower limbs demonstrated some paraspinal and lower limb muscle membrane irritability. Positive waves, fibrillations and mildly neuropathic motor units were seen in the lumbar paraspinals and the most distal muscles of both lower limbs. Motor latencies were acceptable but the right tibial motor evoked amplitude was less than expected and the motor conduction velocities were slowed. I was unable to record any sensory response from either lower limb. The tibial H reflexes were missing. IMPRESSION:      1. Abnormal EMG. 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 My limited history only revealed prediabetes as a possible explanation. 2.  Additionally, the paraspinal findings raises a concern of possible spinal nerve root injury of the S1 nerve roots (bilateral S1 radiculopathies). Although clinically this seems less likely to be an issue than the generalized neuropathy, the electrical picture was consistent with some spinal nerve root injury. Correlation with lumbar imaging may help explain these findings. 3.  No evidence of a concurrent lumbar plexopathy, isolated peripheral nerve entrapment syndrome or primary muscle disease.           Thank you for this interesting referral.

## 2023-09-08 DIAGNOSIS — I10 ESSENTIAL HYPERTENSION: ICD-10-CM

## 2023-09-08 RX ORDER — LISINOPRIL 20 MG/1
20 TABLET ORAL DAILY
Qty: 90 TABLET | Refills: 3 | Status: SHIPPED | OUTPATIENT
Start: 2023-09-08

## 2023-11-02 DIAGNOSIS — E78.2 MIXED HYPERLIPIDEMIA: ICD-10-CM

## 2023-11-02 RX ORDER — ATORVASTATIN CALCIUM 10 MG/1
10 TABLET, FILM COATED ORAL DAILY
Qty: 90 TABLET | Refills: 3 | Status: SHIPPED | OUTPATIENT
Start: 2023-11-02

## 2023-11-07 PROBLEM — R94.6 ABNORMAL THYROID FUNCTION TEST: Status: ACTIVE | Noted: 2023-11-01

## 2024-01-31 ENCOUNTER — OFFICE VISIT (OUTPATIENT)
Dept: INTERNAL MEDICINE CLINIC | Age: 69
End: 2024-01-31
Payer: MEDICARE

## 2024-01-31 VITALS
OXYGEN SATURATION: 96 % | RESPIRATION RATE: 16 BRPM | BODY MASS INDEX: 28.25 KG/M2 | SYSTOLIC BLOOD PRESSURE: 132 MMHG | DIASTOLIC BLOOD PRESSURE: 79 MMHG | HEIGHT: 76 IN | HEART RATE: 80 BPM | WEIGHT: 232 LBS

## 2024-01-31 DIAGNOSIS — E11.42 DIABETIC SENSORIMOTOR POLYNEUROPATHY (HCC): ICD-10-CM

## 2024-01-31 DIAGNOSIS — N40.1 BENIGN PROSTATIC HYPERPLASIA WITH WEAK URINARY STREAM: ICD-10-CM

## 2024-01-31 DIAGNOSIS — I10 PRIMARY HYPERTENSION: ICD-10-CM

## 2024-01-31 DIAGNOSIS — M10.079 IDIOPATHIC GOUT OF FOOT, UNSPECIFIED CHRONICITY, UNSPECIFIED LATERALITY: ICD-10-CM

## 2024-01-31 DIAGNOSIS — R39.12 BENIGN PROSTATIC HYPERPLASIA WITH WEAK URINARY STREAM: ICD-10-CM

## 2024-01-31 DIAGNOSIS — R73.01 IFG (IMPAIRED FASTING GLUCOSE): ICD-10-CM

## 2024-01-31 DIAGNOSIS — E78.2 MIXED HYPERLIPIDEMIA: ICD-10-CM

## 2024-01-31 DIAGNOSIS — N52.9 ERECTILE DYSFUNCTION, UNSPECIFIED ERECTILE DYSFUNCTION TYPE: ICD-10-CM

## 2024-01-31 DIAGNOSIS — I48.0 PAROXYSMAL ATRIAL FIBRILLATION (HCC): Primary | ICD-10-CM

## 2024-01-31 DIAGNOSIS — I48.0 PAROXYSMAL ATRIAL FIBRILLATION (HCC): ICD-10-CM

## 2024-01-31 LAB
ALBUMIN SERPL-MCNC: 4.6 G/DL (ref 3.4–5)
ALBUMIN/GLOB SERPL: 1.6 {RATIO} (ref 1.1–2.2)
ALP SERPL-CCNC: 72 U/L (ref 40–129)
ALT SERPL-CCNC: 21 U/L (ref 10–40)
ANION GAP SERPL CALCULATED.3IONS-SCNC: 11 MMOL/L (ref 3–16)
AST SERPL-CCNC: 20 U/L (ref 15–37)
BILIRUB SERPL-MCNC: 0.6 MG/DL (ref 0–1)
BUN SERPL-MCNC: 26 MG/DL (ref 7–20)
CALCIUM SERPL-MCNC: 9.7 MG/DL (ref 8.3–10.6)
CHLORIDE SERPL-SCNC: 104 MMOL/L (ref 99–110)
CHOLEST SERPL-MCNC: 187 MG/DL (ref 0–199)
CO2 SERPL-SCNC: 25 MMOL/L (ref 21–32)
CREAT SERPL-MCNC: 1.2 MG/DL (ref 0.8–1.3)
DEPRECATED RDW RBC AUTO: 14.4 % (ref 12.4–15.4)
GFR SERPLBLD CREATININE-BSD FMLA CKD-EPI: >60 ML/MIN/{1.73_M2}
GLUCOSE SERPL-MCNC: 111 MG/DL (ref 70–99)
HCT VFR BLD AUTO: 44 % (ref 40.5–52.5)
HDLC SERPL-MCNC: 59 MG/DL (ref 40–60)
HGB BLD-MCNC: 14.9 G/DL (ref 13.5–17.5)
LDLC SERPL CALC-MCNC: 114 MG/DL
MAGNESIUM SERPL-MCNC: 2.1 MG/DL (ref 1.8–2.4)
MCH RBC QN AUTO: 32 PG (ref 26–34)
MCHC RBC AUTO-ENTMCNC: 34 G/DL (ref 31–36)
MCV RBC AUTO: 94.1 FL (ref 80–100)
PLATELET # BLD AUTO: 227 K/UL (ref 135–450)
PMV BLD AUTO: 8 FL (ref 5–10.5)
POTASSIUM SERPL-SCNC: 4.6 MMOL/L (ref 3.5–5.1)
PROT SERPL-MCNC: 7.5 G/DL (ref 6.4–8.2)
RBC # BLD AUTO: 4.67 M/UL (ref 4.2–5.9)
SODIUM SERPL-SCNC: 140 MMOL/L (ref 136–145)
TRIGL SERPL-MCNC: 70 MG/DL (ref 0–150)
TSH SERPL DL<=0.005 MIU/L-ACNC: 0.7 UIU/ML (ref 0.27–4.2)
URATE SERPL-MCNC: 5.8 MG/DL (ref 3.5–7.2)
VLDLC SERPL CALC-MCNC: 14 MG/DL
WBC # BLD AUTO: 6.1 K/UL (ref 4–11)

## 2024-01-31 PROCEDURE — 2022F DILAT RTA XM EVC RTNOPTHY: CPT | Performed by: INTERNAL MEDICINE

## 2024-01-31 PROCEDURE — 99214 OFFICE O/P EST MOD 30 MIN: CPT | Performed by: INTERNAL MEDICINE

## 2024-01-31 PROCEDURE — G8484 FLU IMMUNIZE NO ADMIN: HCPCS | Performed by: INTERNAL MEDICINE

## 2024-01-31 PROCEDURE — 3046F HEMOGLOBIN A1C LEVEL >9.0%: CPT | Performed by: INTERNAL MEDICINE

## 2024-01-31 PROCEDURE — 3078F DIAST BP <80 MM HG: CPT | Performed by: INTERNAL MEDICINE

## 2024-01-31 PROCEDURE — G8427 DOCREV CUR MEDS BY ELIG CLIN: HCPCS | Performed by: INTERNAL MEDICINE

## 2024-01-31 PROCEDURE — 3075F SYST BP GE 130 - 139MM HG: CPT | Performed by: INTERNAL MEDICINE

## 2024-01-31 PROCEDURE — 36415 COLL VENOUS BLD VENIPUNCTURE: CPT | Performed by: INTERNAL MEDICINE

## 2024-01-31 PROCEDURE — 1123F ACP DISCUSS/DSCN MKR DOCD: CPT | Performed by: INTERNAL MEDICINE

## 2024-01-31 PROCEDURE — G8417 CALC BMI ABV UP PARAM F/U: HCPCS | Performed by: INTERNAL MEDICINE

## 2024-01-31 PROCEDURE — 1036F TOBACCO NON-USER: CPT | Performed by: INTERNAL MEDICINE

## 2024-01-31 PROCEDURE — 3017F COLORECTAL CA SCREEN DOC REV: CPT | Performed by: INTERNAL MEDICINE

## 2024-01-31 RX ORDER — TADALAFIL 10 MG/1
10 TABLET ORAL DAILY PRN
Qty: 90 TABLET | Refills: 1 | Status: SHIPPED | OUTPATIENT
Start: 2024-01-31

## 2024-01-31 ASSESSMENT — PATIENT HEALTH QUESTIONNAIRE - PHQ9
SUM OF ALL RESPONSES TO PHQ QUESTIONS 1-9: 0
2. FEELING DOWN, DEPRESSED OR HOPELESS: 0
SUM OF ALL RESPONSES TO PHQ QUESTIONS 1-9: 0
SUM OF ALL RESPONSES TO PHQ9 QUESTIONS 1 & 2: 0

## 2024-02-01 LAB
EST. AVERAGE GLUCOSE BLD GHB EST-MCNC: 105.4 MG/DL
HBA1C MFR BLD: 5.3 %

## 2024-02-01 NOTE — RESULT ENCOUNTER NOTE
Call pt, labs ok/chol ok AND SUGAR STABLE REMAINS IN PREDIABETIC RANGE.  URIC ACID LEVEL FOR GOUT AND THYROID FXN ARE ALSO IN GOOD RANGE

## 2024-02-02 LAB
SHBG SERPL-SCNC: 40 NMOL/L (ref 11–80)
TESTOST FREE SERPL-MCNC: 73.4 PG/ML (ref 47–244)
TESTOST SERPL-MCNC: 406 NG/DL (ref 220–1000)

## 2024-02-04 NOTE — RESULT ENCOUNTER NOTE
Please call pt, his testosterone level came back nl range, currently ~406, was 386 two yrs ago, 531 last yr and all of these are in nl range too.

## 2024-02-12 DIAGNOSIS — I10 ESSENTIAL HYPERTENSION: ICD-10-CM

## 2024-02-12 DIAGNOSIS — N40.0 BENIGN PROSTATIC HYPERPLASIA WITHOUT LOWER URINARY TRACT SYMPTOMS: ICD-10-CM

## 2024-02-12 DIAGNOSIS — M10.079 IDIOPATHIC GOUT OF FOOT, UNSPECIFIED CHRONICITY, UNSPECIFIED LATERALITY: ICD-10-CM

## 2024-02-12 RX ORDER — TERAZOSIN 1 MG/1
CAPSULE ORAL
Qty: 90 CAPSULE | Refills: 3 | Status: SHIPPED | OUTPATIENT
Start: 2024-02-12

## 2024-02-12 RX ORDER — ALLOPURINOL 300 MG/1
300 TABLET ORAL DAILY
Qty: 90 TABLET | Refills: 3 | Status: SHIPPED | OUTPATIENT
Start: 2024-02-12

## 2024-02-29 NOTE — PROGRESS NOTES
Arpit Lyle Sr.  1955 01/31/24    SUBJECTIVE:  BPH, sometimes urination is slowed, psa nl last yr July, he asked about urology eval.  Is already on terazosin    Has had some issues w mild ED, did not have response to sildenafil    Atr fib, had cardioversion again in Jan, last episode was 3 yrs prior.  Denies current palpitations.  On xarelto.    Hypertension: Stable. Denies CP, SOB, cough, visual changes, dizziness, palpitations or HA.      Gout no recent flares.  STABLE ON MEDS.    HX OF IFG, MONITORING FASTING LAB.   Lab Results   Component Value Date    LABA1C 5.1 05/24/2023    LABA1C 5.1 01/19/2023     Lab Results   Component Value Date    LDLCALC 96 07/14/2023    CREATININE 1.5 (H) 07/14/2023         OBJECTIVE:    /79   Pulse 80   Resp 16   Ht 1.93 m (6' 3.98\")   Wt 105.2 kg (232 lb)   SpO2 96%   BMI 28.25 kg/m²     Physical Exam  Vitals reviewed.   Constitutional:       General: He is not in acute distress.     Appearance: He is well-developed.   HENT:      Head: Normocephalic and atraumatic.      Mouth/Throat:      Mouth: Mucous membranes are moist.      Pharynx: Oropharynx is clear. No oropharyngeal exudate or posterior oropharyngeal erythema.   Eyes:      General: No scleral icterus.        Right eye: No discharge.         Left eye: No discharge.      Conjunctiva/sclera: Conjunctivae normal.      Pupils: Pupils are equal, round, and reactive to light.   Neck:      Thyroid: No thyromegaly.      Vascular: No JVD.      Trachea: No tracheal deviation.   Cardiovascular:      Rate and Rhythm: Normal rate and regular rhythm.      Heart sounds: Normal heart sounds. No murmur heard.     No friction rub. No gallop.   Pulmonary:      Effort: Pulmonary effort is normal. No respiratory distress.      Breath sounds: Normal breath sounds. No wheezing or rales.   Abdominal:      General: Bowel sounds are normal. There is no distension.      Palpations: Abdomen is soft. There is no mass.      Tenderness:  Occupational Therapy    Evaluation    Patient Name: Kody Choi  MRN: [id number]  Today's Date: 2/29/2024  Time Calculation  Start Time: 0914  Stop Time: 0935  Time Calculation (min): 21 min        Assessment:  End of Session Communication: Bedside nurse  End of Session Patient Position: Bed, 3 rail up, Alarm on  OT Assessment Results: Decreased ADL status, Decreased upper extremity strength, Decreased safe judgment during ADL, Decreased endurance, Decreased functional mobility  Plan:  Treatment Interventions: ADL retraining, Functional transfer training, UE strengthening/ROM, Endurance training, Compensatory technique education  OT Frequency: 3 times per week  OT Discharge Recommendations: Moderate intensity level of continued care  OT - OK to Discharge: Yes (once medically appropriate to next level of care)  Treatment Interventions: ADL retraining, Functional transfer training, UE strengthening/ROM, Endurance training, Compensatory technique education    Subjective   Current Problem:  1. Cellulitis of scrotum        2. Pyogenic arthritis of right knee joint, due to unspecified organism (CMS/HCC)  DAPTOmycin (Cubicin) 500 mg injection    DISCONTINUED: DAPTOmycin (Cubicin) 500 mg injection    DISCONTINUED: DAPTOmycin (Cubicin) 500 mg injection        General:  General  Reason for Referral: OT eval and tx; ADLs. dx: Scrotal cellulitis , Elevated troponin/elevated BNP,  BARBIE  Referred By: Yassine  Past Medical History Relevant to Rehab: 53 y.o. adult presenting to the emergency department for evaluation of scrotal pain and edema.  Patient is currently at a skilled nursing facility, PICC line, receiving IV antibiotics for recurrent septic arthritis infection of the right knee (MRSA/MSSA) who was recommended an above-the-knee amputation but has refused.  Patient did finally admit to a PICC line and admission to skilled nursing with IV antibiotics.  Patient states that he noticed scrotal edema worsening over the last several  days.  Patient denies chest pain or dizziness.  Patient denies nausea, vomiting, diarrhea. (T2DM, CAD s/p multiple stents and CABG, heart failure (EF 30% 1/30/2024) A-fib, RUPESH, TKA in 2015, recurrent septic arthritis of the right knee)  Missed Visit: Yes  Missed Visit Reason:  (patient ademantly refusing despite max encouragement/education. patient reporting he would be agreeable to work with OT tomorrow. will reattempt as able/appropriate)  Co-Treatment: PT  Co-Treatment Reason: for safety and to maximize therapeutic performance  Prior to Session Communication: Bedside nurse  Patient Position Received:  (patient lying in bed at start and end of eval, 3 rails up, call light in reach, bed alarm engaged, all needs met. ulloa in place throughout)  General Comment: chest x-ray: mild cardiomegaly. US scrotum; B hydroceles. severe scrotal edema. CT pelvis: diffuse scrotal wall thickening extending into BLE, anasarca. CT head: negative for acute findings. psych consult: adjustment disorder.  Precautions:  Medical Precautions:  (up in chair, fall precautions, alarms)    Pain:  Pain Assessment  Pain Assessment:  (reports 10/10 pain in groin and right knee; had pain meds prior to eval)    Objective   Cognition:  Overall Cognitive Status: Within Functional Limits           Home Living:  Type of Home:  (patient from SNF. unsure if on therapy caseload. reports assist for ADLs, use of bedpan. reports hasnt walked in years. reports can self propel manual wheelchair. reports assist x 3 with WW to transfer to w/c)       ADL:  ADL Comments: anticipate MAX A-dependent assistance for ADLs based on bed mobility this date    Bed Mobility/Transfers: Bed Mobility  Bed Mobility:  (completed supine <-->sit with dependent assistance x 2; unable to tolerate fully sitting EOB due to pain)    Transfers  Transfer:  (unable to complete due to safety concerns)     Sensation:  Sensation Comment: reports chronic numbness in groin  Strength:  Strength  Comments: BUE ROM/MMT WFL for patient age      Outcome Measures:[facility] Daily Activity  Putting on and taking off regular lower body clothing: Total  Bathing (including washing, rinsing, drying): Total  Putting on and taking off regular upper body clothing: A lot  Toileting, which includes using toilet, bedpan or urinal: Total  Taking care of personal grooming such as brushing teeth: A little  Eating Meals: None  Daily Activity - Total Score: 12        Education Documentation  Body Mechanics, taught by Dasha Huitron OT at 2/29/2024 12:18 PM.  Learner: Patient  Readiness: Acceptance  Method: Explanation  Response: Verbalizes Understanding, Needs Reinforcement    Education Comments  No comments found.        OP EDUCATION:       Goals:  Encounter Problems       Encounter Problems (Active)       OT Goals       STG- patient will complete LB dressing with MOD A with use of ae/ad/dme prn (Progressing)       Start:  02/29/24    Expected End:  03/14/24            STG- patient will complete toileting with MOD A with use of ae/ad/dme prn (Progressing)       Start:  02/29/24    Expected End:  03/14/24            STG- patient will complete transfers to/from bed, chair, commode at MOD A x 2 with use of ae/ad/dme prn (Progressing)       Start:  02/29/24    Expected End:  03/14/24            STG- patient will complete grooming with supervision with use of ae/ad/dme prn (Progressing)       Start:  02/29/24    Expected End:  03/14/24

## 2024-07-31 ENCOUNTER — OFFICE VISIT (OUTPATIENT)
Dept: INTERNAL MEDICINE CLINIC | Age: 69
End: 2024-07-31

## 2024-07-31 VITALS
BODY MASS INDEX: 28.37 KG/M2 | OXYGEN SATURATION: 97 % | SYSTOLIC BLOOD PRESSURE: 128 MMHG | HEIGHT: 76 IN | DIASTOLIC BLOOD PRESSURE: 78 MMHG | WEIGHT: 233 LBS | HEART RATE: 65 BPM

## 2024-07-31 DIAGNOSIS — M54.2 NECK PAIN ON LEFT SIDE: ICD-10-CM

## 2024-07-31 DIAGNOSIS — R73.01 IFG (IMPAIRED FASTING GLUCOSE): ICD-10-CM

## 2024-07-31 DIAGNOSIS — D12.6 TUBULAR ADENOMA OF COLON: ICD-10-CM

## 2024-07-31 DIAGNOSIS — I10 PRIMARY HYPERTENSION: ICD-10-CM

## 2024-07-31 DIAGNOSIS — E78.2 MIXED HYPERLIPIDEMIA: ICD-10-CM

## 2024-07-31 DIAGNOSIS — M10.079 IDIOPATHIC GOUT OF FOOT, UNSPECIFIED CHRONICITY, UNSPECIFIED LATERALITY: ICD-10-CM

## 2024-07-31 DIAGNOSIS — I48.0 PAROXYSMAL ATRIAL FIBRILLATION (HCC): Primary | ICD-10-CM

## 2024-07-31 DIAGNOSIS — I48.0 PAROXYSMAL ATRIAL FIBRILLATION (HCC): ICD-10-CM

## 2024-07-31 LAB
ALBUMIN SERPL-MCNC: 4.7 G/DL (ref 3.4–5)
ALBUMIN/GLOB SERPL: 1.6 {RATIO} (ref 1.1–2.2)
ALP SERPL-CCNC: 74 U/L (ref 40–129)
ALT SERPL-CCNC: 24 U/L (ref 10–40)
ANION GAP SERPL CALCULATED.3IONS-SCNC: 12 MMOL/L (ref 3–16)
AST SERPL-CCNC: 25 U/L (ref 15–37)
BASOPHILS # BLD: 0.1 K/UL (ref 0–0.2)
BASOPHILS NFR BLD: 1.3 %
BILIRUB SERPL-MCNC: 0.6 MG/DL (ref 0–1)
BUN SERPL-MCNC: 22 MG/DL (ref 7–20)
CALCIUM SERPL-MCNC: 9.8 MG/DL (ref 8.3–10.6)
CHLORIDE SERPL-SCNC: 102 MMOL/L (ref 99–110)
CHOLEST SERPL-MCNC: 177 MG/DL (ref 0–199)
CO2 SERPL-SCNC: 25 MMOL/L (ref 21–32)
CREAT SERPL-MCNC: 1.2 MG/DL (ref 0.8–1.3)
DEPRECATED RDW RBC AUTO: 14.4 % (ref 12.4–15.4)
EOSINOPHIL # BLD: 0.2 K/UL (ref 0–0.6)
EOSINOPHIL NFR BLD: 3.3 %
GFR SERPLBLD CREATININE-BSD FMLA CKD-EPI: 65 ML/MIN/{1.73_M2}
GLUCOSE SERPL-MCNC: 102 MG/DL (ref 70–99)
HCT VFR BLD AUTO: 44.2 % (ref 40.5–52.5)
HDLC SERPL-MCNC: 59 MG/DL (ref 40–60)
HGB BLD-MCNC: 14.9 G/DL (ref 13.5–17.5)
LDLC SERPL CALC-MCNC: 104 MG/DL
LYMPHOCYTES # BLD: 1.1 K/UL (ref 1–5.1)
LYMPHOCYTES NFR BLD: 21.4 %
MAGNESIUM SERPL-MCNC: 2.5 MG/DL (ref 1.8–2.4)
MCH RBC QN AUTO: 32.4 PG (ref 26–34)
MCHC RBC AUTO-ENTMCNC: 33.6 G/DL (ref 31–36)
MCV RBC AUTO: 96.2 FL (ref 80–100)
MONOCYTES # BLD: 0.6 K/UL (ref 0–1.3)
MONOCYTES NFR BLD: 11.3 %
NEUTROPHILS # BLD: 3.1 K/UL (ref 1.7–7.7)
NEUTROPHILS NFR BLD: 62.7 %
PLATELET # BLD AUTO: 212 K/UL (ref 135–450)
PMV BLD AUTO: 8.4 FL (ref 5–10.5)
POTASSIUM SERPL-SCNC: 4.7 MMOL/L (ref 3.5–5.1)
PROT SERPL-MCNC: 7.6 G/DL (ref 6.4–8.2)
RBC # BLD AUTO: 4.6 M/UL (ref 4.2–5.9)
SODIUM SERPL-SCNC: 139 MMOL/L (ref 136–145)
T4 FREE SERPL-MCNC: 2 NG/DL (ref 0.9–1.8)
TRIGL SERPL-MCNC: 68 MG/DL (ref 0–150)
TSH SERPL DL<=0.005 MIU/L-ACNC: 1.28 UIU/ML (ref 0.27–4.2)
URATE SERPL-MCNC: 5.3 MG/DL (ref 3.5–7.2)
VLDLC SERPL CALC-MCNC: 14 MG/DL
WBC # BLD AUTO: 4.9 K/UL (ref 4–11)

## 2024-07-31 PROCEDURE — 36415 COLL VENOUS BLD VENIPUNCTURE: CPT | Performed by: INTERNAL MEDICINE

## 2024-07-31 RX ORDER — AMIODARONE HYDROCHLORIDE 200 MG/1
200 TABLET ORAL DAILY
COMMUNITY
Start: 2024-03-13

## 2024-07-31 RX ORDER — PREDNISONE 5 MG/1
TABLET ORAL
Qty: 21 TABLET | Refills: 0 | Status: SHIPPED | OUTPATIENT
Start: 2024-07-31

## 2024-07-31 RX ORDER — TIZANIDINE 2 MG/1
2 TABLET ORAL 2 TIMES DAILY PRN
Qty: 40 TABLET | Refills: 0 | Status: SHIPPED | OUTPATIENT
Start: 2024-07-31

## 2024-07-31 SDOH — ECONOMIC STABILITY: FOOD INSECURITY: WITHIN THE PAST 12 MONTHS, YOU WORRIED THAT YOUR FOOD WOULD RUN OUT BEFORE YOU GOT MONEY TO BUY MORE.: NEVER TRUE

## 2024-07-31 SDOH — ECONOMIC STABILITY: FOOD INSECURITY: WITHIN THE PAST 12 MONTHS, THE FOOD YOU BOUGHT JUST DIDN'T LAST AND YOU DIDN'T HAVE MONEY TO GET MORE.: NEVER TRUE

## 2024-07-31 SDOH — ECONOMIC STABILITY: HOUSING INSECURITY
IN THE LAST 12 MONTHS, WAS THERE A TIME WHEN YOU DID NOT HAVE A STEADY PLACE TO SLEEP OR SLEPT IN A SHELTER (INCLUDING NOW)?: NO

## 2024-07-31 SDOH — ECONOMIC STABILITY: INCOME INSECURITY: HOW HARD IS IT FOR YOU TO PAY FOR THE VERY BASICS LIKE FOOD, HOUSING, MEDICAL CARE, AND HEATING?: NOT HARD AT ALL

## 2024-07-31 SDOH — ECONOMIC STABILITY: TRANSPORTATION INSECURITY
IN THE PAST 12 MONTHS, HAS LACK OF TRANSPORTATION KEPT YOU FROM MEETINGS, WORK, OR FROM GETTING THINGS NEEDED FOR DAILY LIVING?: NO

## 2024-07-31 NOTE — PROGRESS NOTES
Arpit BONILLA Valdo Sr.  1955 07/31/24    SUBJECTIVE:    Atr fib, had cardioversion at [facility] in Jan and remains now NSR, has routine monitoring for amio side effects also.  WAS TOLD BY [facility] NOT AN IDEAL CANDIDATE FOR WATCHMAN.    GOUT, NO RECENT FLARES.    Hypertension: Stable. Denies CP, SOB, cough, visual changes, dizziness, palpitations or HA.      Lipids:  Is continuing statin therapy and low fat diet.  Tolerating medications w/o myalgias or GI upset.      Hx of prior colon adenoma, ?may be due now or later for recheck dep on new guidelines?    Hx if IFG, has had sl wt gain, we'll f/u lab now.    Lab Results   Component Value Date    LABA1C 5.3 01/31/2024    LABA1C 5.1 05/24/2023    LABA1C 5.1 01/19/2023     Lab Results   Component Value Date    CREATININE 1.2 01/31/2024     The past mo w some recurring L neck pain, rad to arm, worse w neck movement.  Pos spurlings today   OBJECTIVE:    /78 (Site: Left Upper Arm, Position: Sitting, Cuff Size: Medium Adult)   Pulse 65   Ht 1.93 m (6' 3.98\")   Wt 105.7 kg (233 lb)   SpO2 97%   BMI 28.37 kg/m²     Physical Exam  Constitutional:       Appearance: Normal appearance.   HENT:      Head: Normocephalic and atraumatic.   Eyes:      Extraocular Movements: Extraocular movements intact.      Conjunctiva/sclera: Conjunctivae normal.      Pupils: Pupils are equal, round, and reactive to light.   Neck:      Comments: L pos spurlings  Cardiovascular:      Rate and Rhythm: Normal rate and regular rhythm.      Heart sounds: Normal heart sounds. No murmur heard.  Pulmonary:      Effort: Pulmonary effort is normal. No respiratory distress.      Breath sounds: Normal breath sounds.   Abdominal:      General: Abdomen is flat. Bowel sounds are normal. There is no distension.      Palpations: Abdomen is soft. There is no mass.      Tenderness: There is no abdominal tenderness.      Hernia: No hernia is present.   Musculoskeletal:      Cervical back: Rigidity and tenderness

## 2024-08-01 LAB
EST. AVERAGE GLUCOSE BLD GHB EST-MCNC: 105.4 MG/DL
HBA1C MFR BLD: 5.3 %

## 2024-08-01 NOTE — RESULT ENCOUNTER NOTE
Please call pt, lab ok incl chol level, URIC ACID LEVEL, THYROID FXN, SUGAR. ONLY ISSUE IS SL HIGH MAGNESIUM SO REC TO CHECK IF ON ANY VITAMIN SUPPLEMENT TO DECR THIS TO EVERY OTHER DAY OR MON/WED/FRI.    IF NOT ON SUPPLEMENT THEN MAY JUST BE GETTING SOME EXTRA IN DIET SO OTHERWISE NO WORRIES AS THIS IS ONLY ONE TENTH ABOVE NORMAL MAGNESIUM RANGE

## 2024-08-02 DIAGNOSIS — M10.079 IDIOPATHIC GOUT OF FOOT, UNSPECIFIED CHRONICITY, UNSPECIFIED LATERALITY: ICD-10-CM

## 2024-08-02 DIAGNOSIS — N52.9 ERECTILE DYSFUNCTION, UNSPECIFIED ERECTILE DYSFUNCTION TYPE: ICD-10-CM

## 2024-08-02 DIAGNOSIS — E78.2 MIXED HYPERLIPIDEMIA: ICD-10-CM

## 2024-08-02 DIAGNOSIS — I10 ESSENTIAL HYPERTENSION: ICD-10-CM

## 2024-08-02 DIAGNOSIS — N40.0 BENIGN PROSTATIC HYPERPLASIA WITHOUT LOWER URINARY TRACT SYMPTOMS: ICD-10-CM

## 2024-08-02 RX ORDER — TADALAFIL 10 MG/1
10 TABLET ORAL DAILY PRN
Qty: 90 TABLET | Refills: 1 | Status: SHIPPED | OUTPATIENT
Start: 2024-08-02

## 2024-08-02 RX ORDER — LISINOPRIL 20 MG/1
20 TABLET ORAL DAILY
Qty: 90 TABLET | Refills: 1 | Status: SHIPPED | OUTPATIENT
Start: 2024-08-02

## 2024-08-02 RX ORDER — TERAZOSIN 1 MG/1
CAPSULE ORAL
Qty: 90 CAPSULE | Refills: 1 | Status: SHIPPED | OUTPATIENT
Start: 2024-08-02

## 2024-08-02 RX ORDER — ALLOPURINOL 300 MG/1
300 TABLET ORAL DAILY
Qty: 90 TABLET | Refills: 1 | Status: SHIPPED | OUTPATIENT
Start: 2024-08-02

## 2024-08-02 RX ORDER — ATORVASTATIN CALCIUM 10 MG/1
10 TABLET, FILM COATED ORAL DAILY
Qty: 90 TABLET | Refills: 1 | Status: SHIPPED | OUTPATIENT
Start: 2024-08-02

## 2024-08-12 ENCOUNTER — PREP FOR PROCEDURE (OUTPATIENT)
Dept: GASTROENTEROLOGY | Age: 69
End: 2024-08-12

## 2024-08-12 DIAGNOSIS — Z93.3 S/P CECOSTOMY (HCC): ICD-10-CM

## 2024-08-12 RX ORDER — SODIUM CHLORIDE 0.9 % (FLUSH) 0.9 %
5-40 SYRINGE (ML) INJECTION EVERY 12 HOURS SCHEDULED
Status: CANCELLED | OUTPATIENT
Start: 2024-08-12

## 2024-08-12 RX ORDER — SODIUM CHLORIDE 0.9 % (FLUSH) 0.9 %
5-40 SYRINGE (ML) INJECTION PRN
Status: CANCELLED | OUTPATIENT
Start: 2024-08-12

## 2024-08-12 RX ORDER — SODIUM CHLORIDE, SODIUM LACTATE, POTASSIUM CHLORIDE, CALCIUM CHLORIDE 600; 310; 30; 20 MG/100ML; MG/100ML; MG/100ML; MG/100ML
INJECTION, SOLUTION INTRAVENOUS CONTINUOUS
Status: CANCELLED | OUTPATIENT
Start: 2024-08-12

## 2024-08-12 RX ORDER — SODIUM CHLORIDE 9 MG/ML
25 INJECTION, SOLUTION INTRAVENOUS PRN
Status: CANCELLED | OUTPATIENT
Start: 2024-08-12

## 2025-01-28 NOTE — PROGRESS NOTES
Arpit BONILLA Valdo Sr.  1955 01/29/25    SUBJECTIVE:    Atr fib,  no palpitations, had cardioversion last yr.    WAS TOLD BY [facility] NOT AN IDEAL CANDIDATE FOR WATCHMAN.    GOUT, NO RECENT FLARES.    Hypertension: Stable. Denies CP, SOB, cough, visual changes, dizziness, palpitations or HA.      Lipids:  Is continuing statin therapy and low fat diet.  Tolerating medications w/o myalgias or GI upset.  - wt is up, has been more lax w portions over the holidays.    Hx of prior colon adenoma, ?was due for recheck 2024 but did not get sched, Dr Coleman now retired.    ALSO SPORADICALLY NOTICES DARK STOOL BUT IS ON IRON SUPPLEMENT,ALSO AT INCR RISK FOR GI BLEED ON XARELTO.    Hx if IFG, has had sl wt gain, we'll f/u lab now.    Lab Results   Component Value Date    LABA1C 5.3 01/31/2024    LABA1C 5.1 05/24/2023    LABA1C 5.1 01/19/2023     Lab Results   Component Value Date    CREATININE 1.2 01/31/2024       OBJECTIVE:    /70 (Site: Right Upper Arm, Position: Sitting, Cuff Size: Medium Adult)   Pulse 73   Ht 1.93 m (6' 3.98\")   Wt 109.8 kg (242 lb)   SpO2 96%   BMI 29.47 kg/m²     Physical Exam  Vitals reviewed.   Constitutional:       General: He is not in acute distress.     Appearance: He is well-developed.   HENT:      Head: Normocephalic and atraumatic.      Mouth/Throat:      Mouth: Mucous membranes are moist.      Pharynx: Oropharynx is clear. No oropharyngeal exudate or posterior oropharyngeal erythema.   Eyes:      General: No scleral icterus.        Right eye: No discharge.         Left eye: No discharge.      Conjunctiva/sclera: Conjunctivae normal.      Pupils: Pupils are equal, round, and reactive to light.   Neck:      Thyroid: No thyromegaly.      Vascular: No JVD.      Trachea: No tracheal deviation.   Cardiovascular:      Rate and Rhythm: Normal rate and regular rhythm.      Heart sounds: Normal heart sounds. No murmur heard.     No friction rub. No gallop.   Pulmonary:      Effort: Pulmonary effort

## 2025-01-29 ENCOUNTER — OFFICE VISIT (OUTPATIENT)
Dept: INTERNAL MEDICINE CLINIC | Age: 70
End: 2025-01-29

## 2025-01-29 VITALS
BODY MASS INDEX: 29.47 KG/M2 | HEART RATE: 73 BPM | OXYGEN SATURATION: 96 % | DIASTOLIC BLOOD PRESSURE: 70 MMHG | WEIGHT: 242 LBS | SYSTOLIC BLOOD PRESSURE: 128 MMHG | HEIGHT: 76 IN

## 2025-01-29 DIAGNOSIS — I48.20 CHRONIC ATRIAL FIBRILLATION (HCC): Primary | ICD-10-CM

## 2025-01-29 DIAGNOSIS — I10 ESSENTIAL HYPERTENSION: ICD-10-CM

## 2025-01-29 DIAGNOSIS — R19.5 DARK STOOLS: ICD-10-CM

## 2025-01-29 DIAGNOSIS — M10.079 IDIOPATHIC GOUT OF FOOT, UNSPECIFIED CHRONICITY, UNSPECIFIED LATERALITY: ICD-10-CM

## 2025-01-29 DIAGNOSIS — D12.6 TUBULAR ADENOMA OF COLON: ICD-10-CM

## 2025-01-29 DIAGNOSIS — E78.2 MIXED HYPERLIPIDEMIA: ICD-10-CM

## 2025-01-29 DIAGNOSIS — N40.0 BENIGN PROSTATIC HYPERPLASIA WITHOUT LOWER URINARY TRACT SYMPTOMS: ICD-10-CM

## 2025-01-29 PROBLEM — E11.42 DIABETIC SENSORIMOTOR POLYNEUROPATHY (HCC): Status: RESOLVED | Noted: 2024-01-31 | Resolved: 2025-01-29

## 2025-01-29 RX ORDER — LISINOPRIL 20 MG/1
20 TABLET ORAL DAILY
Qty: 90 TABLET | Refills: 1 | Status: SHIPPED | OUTPATIENT
Start: 2025-01-29

## 2025-01-29 RX ORDER — ATORVASTATIN CALCIUM 10 MG/1
10 TABLET, FILM COATED ORAL DAILY
Qty: 90 TABLET | Refills: 1 | Status: SHIPPED | OUTPATIENT
Start: 2025-01-29

## 2025-01-29 RX ORDER — ALLOPURINOL 300 MG/1
300 TABLET ORAL DAILY
Qty: 90 TABLET | Refills: 1 | Status: SHIPPED | OUTPATIENT
Start: 2025-01-29

## 2025-01-29 RX ORDER — TERAZOSIN 1 MG/1
CAPSULE ORAL
Qty: 90 CAPSULE | Refills: 1 | Status: SHIPPED | OUTPATIENT
Start: 2025-01-29

## 2025-01-29 SDOH — ECONOMIC STABILITY: FOOD INSECURITY: WITHIN THE PAST 12 MONTHS, YOU WORRIED THAT YOUR FOOD WOULD RUN OUT BEFORE YOU GOT MONEY TO BUY MORE.: NEVER TRUE

## 2025-01-29 SDOH — ECONOMIC STABILITY: FOOD INSECURITY: WITHIN THE PAST 12 MONTHS, THE FOOD YOU BOUGHT JUST DIDN'T LAST AND YOU DIDN'T HAVE MONEY TO GET MORE.: NEVER TRUE

## 2025-01-29 ASSESSMENT — PATIENT HEALTH QUESTIONNAIRE - PHQ9
2. FEELING DOWN, DEPRESSED OR HOPELESS: NOT AT ALL
1. LITTLE INTEREST OR PLEASURE IN DOING THINGS: NOT AT ALL
SUM OF ALL RESPONSES TO PHQ QUESTIONS 1-9: 0
SUM OF ALL RESPONSES TO PHQ9 QUESTIONS 1 & 2: 0

## 2025-01-29 NOTE — PATIENT INSTRUCTIONS
To lose wt ,rec to try the phone AP MYFITNESSPAL OR THE AP LOSE IT, keeping intake to - rec staying below 2000cal/day  Rec 20-30min exercise/day.

## 2025-07-11 ENCOUNTER — TELEPHONE (OUTPATIENT)
Dept: INTERNAL MEDICINE CLINIC | Age: 70
End: 2025-07-11

## 2025-07-26 NOTE — PROGRESS NOTES
Arpit BONILLA Valdo Sr.  1955 07/28/25    SUBJECTIVE:    aFIB - cont f/u w [facility] cardiology, seen 2x/yr.  On amiodarone, has periodic flares nearly every 3 yrs, has had ablation 3x.    No recent palpitations.  They monitor closely for amiodarone side effects.  Checking liver and thyroid fxn, PFTs.    Hypertension: Stable. Denies CP, SOB, cough, visual changes, dizziness, palpitations or HA.      Gout, no recent flares.    Lipids:  Is continuing statin therapy and low fat diet.  Tolerating medications w/o myalgias or GI upset.      Hx of tubular adenoma, 2019 and ?may be due for f/u now 2024.  - states had this done by Dr Holley, we did not get rept and will call.      IFG-weight is down from the beginning of the year, previous prediabetes has improved and suspect this may be the primary cause of his peripheral neuropathy..  Have highly recommended continued weight loss and also maintaining a low carb diet.  Lab Results   Component Value Date    LABA1C 5.3 07/31/2024    LABA1C 5.3 01/31/2024    LABA1C 5.1 05/24/2023     Lab Results   Component Value Date    CREATININE 1.2 07/31/2024     PERIPH NEUROPATHY, EMG DONE 9/2023, HE'S STAYING ON TRACK W DIET AS THIS IS LIKELY DUE TO PREDIABETES.    UPPER CHEST RASH THE PAST WEEK, ITCHY.  NO CONTACT EXPOSURES.    OBJECTIVE:    There were no vitals taken for this visit.    Physical Exam  Constitutional:       Appearance: Normal appearance.   HENT:      Head: Normocephalic and atraumatic.   Eyes:      Extraocular Movements: Extraocular movements intact.      Conjunctiva/sclera: Conjunctivae normal.      Pupils: Pupils are equal, round, and reactive to light.   Cardiovascular:      Rate and Rhythm: Normal rate and regular rhythm.      Heart sounds: Normal heart sounds. No murmur heard.  Pulmonary:      Effort: Pulmonary effort is normal. No respiratory distress.      Breath sounds: Normal breath sounds.   Abdominal:      General: Abdomen is flat. Bowel sounds are normal. There is no

## 2025-07-28 ENCOUNTER — OFFICE VISIT (OUTPATIENT)
Dept: INTERNAL MEDICINE CLINIC | Age: 70
End: 2025-07-28

## 2025-07-28 VITALS
SYSTOLIC BLOOD PRESSURE: 134 MMHG | HEIGHT: 76 IN | OXYGEN SATURATION: 94 % | BODY MASS INDEX: 28.69 KG/M2 | DIASTOLIC BLOOD PRESSURE: 70 MMHG | WEIGHT: 235.6 LBS | HEART RATE: 67 BPM

## 2025-07-28 VITALS
HEART RATE: 67 BPM | SYSTOLIC BLOOD PRESSURE: 134 MMHG | DIASTOLIC BLOOD PRESSURE: 70 MMHG | OXYGEN SATURATION: 94 % | WEIGHT: 235.6 LBS | BODY MASS INDEX: 28.69 KG/M2

## 2025-07-28 DIAGNOSIS — Z00.00 MEDICARE ANNUAL WELLNESS VISIT, SUBSEQUENT: Primary | ICD-10-CM

## 2025-07-28 DIAGNOSIS — E78.2 MIXED HYPERLIPIDEMIA: ICD-10-CM

## 2025-07-28 DIAGNOSIS — M10.079 IDIOPATHIC GOUT OF FOOT, UNSPECIFIED CHRONICITY, UNSPECIFIED LATERALITY: ICD-10-CM

## 2025-07-28 DIAGNOSIS — I48.0 PAROXYSMAL ATRIAL FIBRILLATION (HCC): Primary | ICD-10-CM

## 2025-07-28 DIAGNOSIS — L23.9 ALLERGIC DERMATITIS: ICD-10-CM

## 2025-07-28 DIAGNOSIS — R73.01 IFG (IMPAIRED FASTING GLUCOSE): ICD-10-CM

## 2025-07-28 DIAGNOSIS — I10 ESSENTIAL HYPERTENSION: ICD-10-CM

## 2025-07-28 DIAGNOSIS — N40.0 BENIGN PROSTATIC HYPERPLASIA WITHOUT LOWER URINARY TRACT SYMPTOMS: ICD-10-CM

## 2025-07-28 PROBLEM — Z93.3 S/P CECOSTOMY (HCC): Status: RESOLVED | Noted: 2024-08-12 | Resolved: 2025-07-28

## 2025-07-28 RX ORDER — LISINOPRIL 20 MG/1
20 TABLET ORAL DAILY
Qty: 90 TABLET | Refills: 1 | Status: SHIPPED | OUTPATIENT
Start: 2025-07-28

## 2025-07-28 RX ORDER — CLOTRIMAZOLE AND BETAMETHASONE DIPROPIONATE 10; .64 MG/G; MG/G
CREAM TOPICAL
Qty: 45 G | Refills: 0 | Status: SHIPPED | OUTPATIENT
Start: 2025-07-28

## 2025-07-28 RX ORDER — ATORVASTATIN CALCIUM 10 MG/1
10 TABLET, FILM COATED ORAL DAILY
Qty: 90 TABLET | Refills: 1 | Status: SHIPPED | OUTPATIENT
Start: 2025-07-28

## 2025-07-28 RX ORDER — TERAZOSIN 1 MG/1
CAPSULE ORAL
Qty: 90 CAPSULE | Refills: 1 | Status: SHIPPED | OUTPATIENT
Start: 2025-07-28

## 2025-07-28 ASSESSMENT — LIFESTYLE VARIABLES
HOW MANY STANDARD DRINKS CONTAINING ALCOHOL DO YOU HAVE ON A TYPICAL DAY: 1 OR 2
HOW OFTEN DO YOU HAVE A DRINK CONTAINING ALCOHOL: 2-3 TIMES A WEEK

## 2025-07-28 ASSESSMENT — PATIENT HEALTH QUESTIONNAIRE - PHQ9
1. LITTLE INTEREST OR PLEASURE IN DOING THINGS: NOT AT ALL
SUM OF ALL RESPONSES TO PHQ QUESTIONS 1-9: 0
2. FEELING DOWN, DEPRESSED OR HOPELESS: NOT AT ALL
SUM OF ALL RESPONSES TO PHQ QUESTIONS 1-9: 0

## 2025-07-28 NOTE — PROGRESS NOTES
Medicare Annual Wellness Visit    Arpit Lyle  is here for Medicare AWV    Assessment & Plan   Medicare annual wellness visit, subsequent     Return in about 1 year (around 7/28/2026) for medicare physical-.     Subjective       Patient's complete Health Risk Assessment and screening values have been reviewed and are found in Flowsheets. The following problems were reviewed today and where indicated follow up appointments were made and/or referrals ordered.    Positive Risk Factor Screenings with Interventions:                   Vision Screen:  Do you have difficulty driving, watching TV, or doing any of your daily activities because of your eyesight?: No  Have you had an eye exam within the past year?: (!) No (Patient needs to schedule appt)  Interventions:   Patient declines any further evaluation or treatment                     Objective   Vitals:    07/28/25 0820   BP: 134/70   Pulse: 67   SpO2: 94%   Weight: 106.9 kg (235 lb 9.6 oz)   Height: 1.93 m (6' 3.98\")      Body mass index is 28.69 kg/m².                    Allergies   Allergen Reactions    Bactrim [Sulfamethoxazole-Trimethoprim]      Hives      Norvasc [Amlodipine]      Leg swelling     Prior to Visit Medications    Medication Sig Taking? Authorizing Provider   rivaroxaban (XARELTO) 20 MG TABS tablet Take 1 tablet by mouth daily (with breakfast) Yes Cam Leyva MD   terazosin (HYTRIN) 1 MG capsule TAKE 1 CAPSULE BY MOUTH AT  NIGHT Yes Cam Leyva MD   lisinopril (PRINIVIL;ZESTRIL) 20 MG tablet Take 1 tablet by mouth daily Yes Cam Leyva MD   atorvastatin (LIPITOR) 10 MG tablet Take 1 tablet by mouth daily Yes Cam Leyva MD   clotrimazole-betamethasone (LOTRISONE) 1-0.05 % cream Apply topically 2 times daily. Yes Cam Leyva MD   allopurinol (ZYLOPRIM) 300 MG tablet Take 1 tablet by mouth daily Yes Cam Leyva MD   tadalafil (CIALIS) 10 MG tablet Take 1 tablet by mouth daily as needed for

## (undated) DEVICE — TUBING, SUCTION, 3/16" X 6', STRAIGHT: Brand: MEDLINE

## (undated) DEVICE — BW-412T DISP COMBO CLEANING BRUSH: Brand: SINGLE USE COMBINATION CLEANING BRUSH

## (undated) DEVICE — THE TORRENT IRRIGATION SCOPE CONNECTOR IS USED WITH THE TORRENT IRRIGATION TUBING TO PROVIDE IRRIGATION FLUIDS SUCH AS STERILE WATER DURING GASTROINTESTINAL ENDOSCOPIC PROCEDURES WHEN USED IN CONJUNCTION WITH AN IRRIGATION PUMP (OR ELECTROSURGICAL UNIT).: Brand: TORRENT

## (undated) DEVICE — LINER SUCT CANSTR 1500CC SEMI RIG W/ POR HYDROPHOBIC SHUT

## (undated) DEVICE — LINE SAMP O2 6.5FT W/FEMALE CONN F/ADULT CAPNOLINE PLUS

## (undated) DEVICE — KENDALL 500 SERIES DIAPHORETIC FOAM MONITORING ELECTRODE - TEAR DROP SHAPE ( 30/PK): Brand: KENDALL

## (undated) DEVICE — JELLY LUBRICATING 3 GM BACTERIOSTATIC